# Patient Record
Sex: FEMALE | Race: WHITE | NOT HISPANIC OR LATINO | Employment: OTHER | URBAN - METROPOLITAN AREA
[De-identification: names, ages, dates, MRNs, and addresses within clinical notes are randomized per-mention and may not be internally consistent; named-entity substitution may affect disease eponyms.]

---

## 2021-09-13 ENCOUNTER — HOSPITAL ENCOUNTER (EMERGENCY)
Facility: HOSPITAL | Age: 61
Discharge: HOME | End: 2021-09-13
Attending: EMERGENCY MEDICINE | Admitting: EMERGENCY MEDICINE

## 2021-09-13 ENCOUNTER — APPOINTMENT (OUTPATIENT)
Dept: GENERAL RADIOLOGY | Facility: HOSPITAL | Age: 61
End: 2021-09-13

## 2021-09-13 ENCOUNTER — APPOINTMENT (OUTPATIENT)
Dept: CT IMAGING | Facility: HOSPITAL | Age: 61
End: 2021-09-13

## 2021-09-13 VITALS
SYSTOLIC BLOOD PRESSURE: 146 MMHG | TEMPERATURE: 99.2 F | WEIGHT: 130 LBS | HEART RATE: 66 BPM | HEIGHT: 60 IN | RESPIRATION RATE: 16 BRPM | DIASTOLIC BLOOD PRESSURE: 85 MMHG | OXYGEN SATURATION: 96 % | BODY MASS INDEX: 25.52 KG/M2

## 2021-09-13 DIAGNOSIS — N39.0 ACUTE URINARY TRACT INFECTION: Primary | ICD-10-CM

## 2021-09-13 DIAGNOSIS — R41.0 TRANSIENT CONFUSION: ICD-10-CM

## 2021-09-13 LAB
ALBUMIN SERPL-MCNC: 4.7 G/DL (ref 3.5–5.2)
ALBUMIN/GLOB SERPL: 1.9 G/DL
ALP SERPL-CCNC: 89 U/L (ref 39–117)
ALT SERPL W P-5'-P-CCNC: 11 U/L (ref 1–33)
ANION GAP SERPL CALCULATED.3IONS-SCNC: 13 MMOL/L (ref 5–15)
AST SERPL-CCNC: 15 U/L (ref 1–32)
BACTERIA UR QL AUTO: ABNORMAL /HPF
BASOPHILS # BLD AUTO: 0.13 10*3/MM3 (ref 0–0.2)
BASOPHILS NFR BLD AUTO: 1.2 % (ref 0–1.5)
BILIRUB SERPL-MCNC: 0.2 MG/DL (ref 0–1.2)
BILIRUB UR QL STRIP: NEGATIVE
BUN SERPL-MCNC: 15 MG/DL (ref 8–23)
BUN/CREAT SERPL: 15.6 (ref 7–25)
CALCIUM SPEC-SCNC: 9.5 MG/DL (ref 8.6–10.5)
CHLORIDE SERPL-SCNC: 99 MMOL/L (ref 98–107)
CLARITY UR: ABNORMAL
CO2 SERPL-SCNC: 25 MMOL/L (ref 22–29)
COLOR UR: YELLOW
CREAT SERPL-MCNC: 0.96 MG/DL (ref 0.57–1)
DEPRECATED RDW RBC AUTO: 44.8 FL (ref 37–54)
EOSINOPHIL # BLD AUTO: 0.03 10*3/MM3 (ref 0–0.4)
EOSINOPHIL NFR BLD AUTO: 0.3 % (ref 0.3–6.2)
ERYTHROCYTE [DISTWIDTH] IN BLOOD BY AUTOMATED COUNT: 12.6 % (ref 12.3–15.4)
FLUAV SUBTYP SPEC NAA+PROBE: NOT DETECTED
FLUBV RNA ISLT QL NAA+PROBE: NOT DETECTED
GFR SERPL CREATININE-BSD FRML MDRD: 59 ML/MIN/1.73
GLOBULIN UR ELPH-MCNC: 2.5 GM/DL
GLUCOSE SERPL-MCNC: 139 MG/DL (ref 65–99)
GLUCOSE UR STRIP-MCNC: NEGATIVE MG/DL
HCT VFR BLD AUTO: 44.5 % (ref 34–46.6)
HGB BLD-MCNC: 14.6 G/DL (ref 12–15.9)
HGB UR QL STRIP.AUTO: ABNORMAL
HOLD SPECIMEN: NORMAL
HYALINE CASTS UR QL AUTO: ABNORMAL /LPF
IMM GRANULOCYTES # BLD AUTO: 0.05 10*3/MM3 (ref 0–0.05)
IMM GRANULOCYTES NFR BLD AUTO: 0.4 % (ref 0–0.5)
KETONES UR QL STRIP: ABNORMAL
LEUKOCYTE ESTERASE UR QL STRIP.AUTO: NEGATIVE
LYMPHOCYTES # BLD AUTO: 1.44 10*3/MM3 (ref 0.7–3.1)
LYMPHOCYTES NFR BLD AUTO: 12.9 % (ref 19.6–45.3)
MAGNESIUM SERPL-MCNC: 1.7 MG/DL (ref 1.6–2.4)
MCH RBC QN AUTO: 31.3 PG (ref 26.6–33)
MCHC RBC AUTO-ENTMCNC: 32.8 G/DL (ref 31.5–35.7)
MCV RBC AUTO: 95.5 FL (ref 79–97)
MONOCYTES # BLD AUTO: 0.61 10*3/MM3 (ref 0.1–0.9)
MONOCYTES NFR BLD AUTO: 5.5 % (ref 5–12)
NEUTROPHILS NFR BLD AUTO: 79.7 % (ref 42.7–76)
NEUTROPHILS NFR BLD AUTO: 8.9 10*3/MM3 (ref 1.7–7)
NITRITE UR QL STRIP: NEGATIVE
NRBC BLD AUTO-RTO: 0 /100 WBC (ref 0–0.2)
PH UR STRIP.AUTO: 6 [PH] (ref 5–8)
PLATELET # BLD AUTO: 399 10*3/MM3 (ref 140–450)
PMV BLD AUTO: 9.3 FL (ref 6–12)
POTASSIUM SERPL-SCNC: 4.5 MMOL/L (ref 3.5–5.2)
PROT SERPL-MCNC: 7.2 G/DL (ref 6–8.5)
PROT UR QL STRIP: ABNORMAL
RBC # BLD AUTO: 4.66 10*6/MM3 (ref 3.77–5.28)
RBC # UR: ABNORMAL /HPF
REF LAB TEST METHOD: ABNORMAL
SARS-COV-2 RNA PNL SPEC NAA+PROBE: NOT DETECTED
SODIUM SERPL-SCNC: 137 MMOL/L (ref 136–145)
SP GR UR STRIP: 1.02 (ref 1–1.03)
SQUAMOUS #/AREA URNS HPF: ABNORMAL /HPF
TROPONIN T SERPL-MCNC: <0.01 NG/ML (ref 0–0.03)
UROBILINOGEN UR QL STRIP: ABNORMAL
WBC # BLD AUTO: 11.16 10*3/MM3 (ref 3.4–10.8)
WBC UR QL AUTO: ABNORMAL /HPF
WHOLE BLOOD HOLD SPECIMEN: NORMAL
WHOLE BLOOD HOLD SPECIMEN: NORMAL

## 2021-09-13 PROCEDURE — 25010000002 CEFTRIAXONE PER 250 MG: Performed by: EMERGENCY MEDICINE

## 2021-09-13 PROCEDURE — 94640 AIRWAY INHALATION TREATMENT: CPT

## 2021-09-13 PROCEDURE — 70450 CT HEAD/BRAIN W/O DYE: CPT

## 2021-09-13 PROCEDURE — 87636 SARSCOV2 & INF A&B AMP PRB: CPT | Performed by: EMERGENCY MEDICINE

## 2021-09-13 PROCEDURE — 84484 ASSAY OF TROPONIN QUANT: CPT | Performed by: EMERGENCY MEDICINE

## 2021-09-13 PROCEDURE — 85025 COMPLETE CBC W/AUTO DIFF WBC: CPT | Performed by: EMERGENCY MEDICINE

## 2021-09-13 PROCEDURE — 83735 ASSAY OF MAGNESIUM: CPT | Performed by: EMERGENCY MEDICINE

## 2021-09-13 PROCEDURE — 96365 THER/PROPH/DIAG IV INF INIT: CPT

## 2021-09-13 PROCEDURE — 0042T HC CT CEREBRAL PERFUSION W/WO CONTRAST: CPT

## 2021-09-13 PROCEDURE — 70498 CT ANGIOGRAPHY NECK: CPT

## 2021-09-13 PROCEDURE — 71045 X-RAY EXAM CHEST 1 VIEW: CPT

## 2021-09-13 PROCEDURE — 81001 URINALYSIS AUTO W/SCOPE: CPT | Performed by: EMERGENCY MEDICINE

## 2021-09-13 PROCEDURE — 80053 COMPREHEN METABOLIC PANEL: CPT | Performed by: EMERGENCY MEDICINE

## 2021-09-13 PROCEDURE — 70496 CT ANGIOGRAPHY HEAD: CPT

## 2021-09-13 PROCEDURE — 0 IOPAMIDOL PER 1 ML: Performed by: EMERGENCY MEDICINE

## 2021-09-13 PROCEDURE — 93005 ELECTROCARDIOGRAM TRACING: CPT | Performed by: EMERGENCY MEDICINE

## 2021-09-13 PROCEDURE — 99284 EMERGENCY DEPT VISIT MOD MDM: CPT

## 2021-09-13 RX ORDER — CEFDINIR 300 MG/1
300 CAPSULE ORAL 2 TIMES DAILY
Qty: 14 CAPSULE | Refills: 0 | Status: SHIPPED | OUTPATIENT
Start: 2021-09-13 | End: 2021-09-20

## 2021-09-13 RX ORDER — SODIUM CHLORIDE 0.9 % (FLUSH) 0.9 %
10 SYRINGE (ML) INJECTION AS NEEDED
Status: DISCONTINUED | OUTPATIENT
Start: 2021-09-13 | End: 2021-09-13 | Stop reason: HOSPADM

## 2021-09-13 RX ORDER — ALBUTEROL SULFATE 90 UG/1
6 AEROSOL, METERED RESPIRATORY (INHALATION) ONCE
Status: COMPLETED | OUTPATIENT
Start: 2021-09-13 | End: 2021-09-13

## 2021-09-13 RX ADMIN — SODIUM CHLORIDE 2 G: 900 INJECTION INTRAVENOUS at 20:04

## 2021-09-13 RX ADMIN — ALBUTEROL SULFATE 6 PUFF: 90 AEROSOL, METERED RESPIRATORY (INHALATION) at 17:56

## 2021-09-13 RX ADMIN — IOPAMIDOL 110 ML: 755 INJECTION, SOLUTION INTRAVENOUS at 18:31

## 2021-09-13 NOTE — DISCHARGE INSTRUCTIONS
Take antibiotics as prescribed.    Rest and drink plenty of fluids.    Follow-up with primary care physician in 2 days.    Return to the ER with any further concern or worsening symptoms.

## 2021-09-14 NOTE — ED PROVIDER NOTES
Subjective   60-year-old female who presents for evaluation of altered mental status.  Per the daughter's report the patient had a couple episodes of a dazed appearance.  The daughter states that at 3 PM the patient appeared dazed, and had difficulty getting her words out.  This resolved within just a few minutes.  She had another episode at approxi-4:30 PM that was similar nature.  She now has presented for further evaluation.  She talks and answers all questions appropriately currently.  Per the daughter's report the patient has a previous history of TIAs.  The patient was not observed to have focal weakness to the face, arms, or legs during these episodes.  No fever has been observed at home. The patient has underlying history of COPD but denies any acute shortness of breath or increased frequency of cough.  The patient does report some mild urinary frequency, but denies any dysuria.  No abdominal pain.  No reported change in bowel function.  No other acute complaints.          Review of Systems   Constitutional: Negative for chills, fatigue and fever.   HENT: Negative for congestion, ear pain, postnasal drip, sinus pressure and sore throat.    Eyes: Negative for pain, redness and visual disturbance.   Respiratory: Negative for cough, chest tightness and shortness of breath.    Cardiovascular: Negative for chest pain, palpitations and leg swelling.   Gastrointestinal: Negative for abdominal pain, anal bleeding, blood in stool, diarrhea, nausea and vomiting.   Endocrine: Negative for polydipsia and polyuria.   Genitourinary: Positive for frequency. Negative for difficulty urinating, dysuria and urgency.   Musculoskeletal: Negative for arthralgias, back pain and neck pain.   Skin: Negative for pallor and rash.   Allergic/Immunologic: Negative for environmental allergies and immunocompromised state.   Neurological: Positive for speech difficulty. Negative for dizziness, weakness and headaches.   Hematological:  Negative for adenopathy.   Psychiatric/Behavioral: Positive for decreased concentration. Negative for confusion, self-injury and suicidal ideas. The patient is not nervous/anxious.    All other systems reviewed and are negative.      No past medical history on file.    No Known Allergies    No past surgical history on file.    No family history on file.    Social History     Socioeconomic History   • Marital status: Legally      Spouse name: Not on file   • Number of children: Not on file   • Years of education: Not on file   • Highest education level: Not on file           Objective   Physical Exam  Vitals and nursing note reviewed.   Constitutional:       General: She is not in acute distress.     Appearance: Normal appearance. She is well-developed. She is not toxic-appearing or diaphoretic.   HENT:      Head: Normocephalic and atraumatic.      Right Ear: External ear normal.      Left Ear: External ear normal.      Nose: Nose normal.   Eyes:      General: Lids are normal.      Pupils: Pupils are equal, round, and reactive to light.   Neck:      Trachea: No tracheal deviation.   Cardiovascular:      Rate and Rhythm: Normal rate and regular rhythm.      Pulses: No decreased pulses.      Heart sounds: Normal heart sounds. No murmur heard.   No friction rub. No gallop.    Pulmonary:      Effort: Pulmonary effort is normal. No respiratory distress.      Breath sounds: Examination of the right-middle field reveals wheezing. Examination of the left-middle field reveals wheezing. Examination of the right-lower field reveals wheezing. Examination of the left-lower field reveals wheezing. Wheezing present. No decreased breath sounds, rhonchi or rales.       Abdominal:      General: Bowel sounds are normal.      Palpations: Abdomen is soft.      Tenderness: There is no abdominal tenderness. There is no guarding or rebound.   Musculoskeletal:         General: No deformity. Normal range of motion.      Cervical  back: Normal range of motion and neck supple.   Lymphadenopathy:      Cervical: No cervical adenopathy.   Skin:     General: Skin is warm and dry.      Findings: No rash.   Neurological:      Mental Status: She is alert and oriented to person, place, and time.      GCS: GCS eye subscore is 4. GCS verbal subscore is 5. GCS motor subscore is 6.      Cranial Nerves: No cranial nerve deficit.      Sensory: No sensory deficit.      Comments: No neurological deficits on exam.  GCS 15.   Psychiatric:         Speech: Speech normal.         Behavior: Behavior normal.         Thought Content: Thought content normal.         Judgment: Judgment normal.         Procedures           ED Course                                           MDM  Number of Diagnoses or Management Options  Acute urinary tract infection: new and requires workup  Transient confusion: new and requires workup  Diagnosis management comments: Urine shows 1+ bacteria concerning for acute urinary tract infection.    The patient's overall presentation is potentially concerning for TIA.  I discussed admission to the hospital with the patient, and with the daughter.  The patient strongly desires to go home, and the daughter is agreeable with this plan at this given time.  The patient has no focal neurological deficits at this given time and CT imaging of the head shows no acute abnormalities.    An initial dose of IV antibiotics was administered here in the ER and the patient will be discharged with a course of Omnicef.  The patient will be advised to rest, drink plenty of fluids, observe for any further symptoms.  Advised to return immediately to the ER with any further concern.    The patient does have a follow-up with primary care physician in 2 days.       Amount and/or Complexity of Data Reviewed  Clinical lab tests: ordered and reviewed  Tests in the radiology section of CPT®: reviewed and ordered  Obtain history from someone other than the patient:  yes  Independent visualization of images, tracings, or specimens: yes        Final diagnoses:   Acute urinary tract infection   Transient confusion       ED Disposition  ED Disposition     ED Disposition Condition Comment    Discharge Stable           Maragrita Lynch MD  [address] [place] [address]   [place] [id number] [phone]    In 2 days           Medication List      New Prescriptions    cefdinir 300 MG capsule  Commonly known as: OMNICEF  Take 1 capsule by mouth 2 (Two) Times a Day for 7 days.           Where to Get Your Medications      These medications were sent to [facility] [address] [place], [place] - [address] [place] [address] - [phone]  - [phone] [address] [address]    Phone: [phone]   · cefdinir 300 MG capsule          Matthew Cleaning MD  09/13/21 2013

## 2021-09-15 ENCOUNTER — OFFICE VISIT (OUTPATIENT)
Dept: NEUROSURGERY | Facility: CLINIC | Age: 61
End: 2021-09-15

## 2021-09-15 VITALS — WEIGHT: 141 LBS | TEMPERATURE: 97.3 F | HEIGHT: 60 IN | BODY MASS INDEX: 27.68 KG/M2

## 2021-09-15 DIAGNOSIS — M47.812 CERVICAL SPONDYLOSIS WITHOUT MYELOPATHY: ICD-10-CM

## 2021-09-15 DIAGNOSIS — Z00.6 EXAM FOR CLINICAL RESEARCH: Primary | ICD-10-CM

## 2021-09-15 DIAGNOSIS — M51.36 DEGENERATIVE DISC DISEASE, LUMBAR: ICD-10-CM

## 2021-09-15 DIAGNOSIS — M54.12 CERVICAL RADICULOPATHY: ICD-10-CM

## 2021-09-15 DIAGNOSIS — M50.30 DDD (DEGENERATIVE DISC DISEASE), CERVICAL: ICD-10-CM

## 2021-09-15 DIAGNOSIS — M47.812 CERVICAL SPONDYLOSIS WITHOUT MYELOPATHY: Primary | ICD-10-CM

## 2021-09-15 DIAGNOSIS — Z71.6 ENCOUNTER FOR SMOKING CESSATION COUNSELING: ICD-10-CM

## 2021-09-15 DIAGNOSIS — M54.59 MECHANICAL LOW BACK PAIN: ICD-10-CM

## 2021-09-15 PROCEDURE — 99204 OFFICE O/P NEW MOD 45 MIN: CPT | Performed by: PHYSICIAN ASSISTANT

## 2021-09-15 RX ORDER — BUSPIRONE HYDROCHLORIDE 5 MG/1
5 TABLET ORAL 2 TIMES DAILY
COMMUNITY
Start: 2021-08-20

## 2021-09-15 RX ORDER — OMEPRAZOLE 20 MG/1
20 CAPSULE, DELAYED RELEASE ORAL 2 TIMES DAILY
COMMUNITY
Start: 2021-08-20

## 2021-09-15 RX ORDER — MELOXICAM 15 MG/1
15 TABLET ORAL DAILY
Status: ON HOLD | COMMUNITY
End: 2023-03-17

## 2021-09-15 RX ORDER — ASPIRIN 325 MG
1 TABLET ORAL
Status: ON HOLD | COMMUNITY
End: 2022-10-16

## 2021-09-15 RX ORDER — TRAZODONE HYDROCHLORIDE 50 MG/1
50 TABLET ORAL NIGHTLY
COMMUNITY

## 2021-09-15 RX ORDER — CLOPIDOGREL BISULFATE 75 MG/1
75 TABLET ORAL DAILY
COMMUNITY
Start: 2021-08-20

## 2021-09-15 RX ORDER — POTASSIUM CHLORIDE 750 MG/1
10 TABLET, FILM COATED, EXTENDED RELEASE ORAL DAILY
COMMUNITY
Start: 2021-08-20 | End: 2023-03-20 | Stop reason: HOSPADM

## 2021-09-15 RX ORDER — TIZANIDINE 4 MG/1
4 TABLET ORAL DAILY
COMMUNITY
Start: 2021-08-20 | End: 2023-03-20 | Stop reason: HOSPADM

## 2021-09-15 RX ORDER — SPIRONOLACTONE 25 MG/1
25 TABLET ORAL DAILY
COMMUNITY
Start: 2021-08-20 | End: 2023-03-20 | Stop reason: HOSPADM

## 2021-09-15 RX ORDER — HYDRALAZINE HYDROCHLORIDE 25 MG/1
25 TABLET, FILM COATED ORAL 3 TIMES DAILY
COMMUNITY
Start: 2021-08-20 | End: 2023-03-20 | Stop reason: HOSPADM

## 2021-09-15 RX ORDER — ESCITALOPRAM OXALATE 20 MG/1
20 TABLET ORAL DAILY
COMMUNITY
Start: 2021-08-20

## 2021-09-15 RX ORDER — AMLODIPINE BESYLATE 10 MG/1
10 TABLET ORAL DAILY
COMMUNITY
Start: 2021-08-20

## 2021-09-15 RX ORDER — MECLIZINE HYDROCHLORIDE 25 MG/1
TABLET ORAL SEE ADMIN INSTRUCTIONS
COMMUNITY
Start: 2021-08-20 | End: 2023-03-20 | Stop reason: HOSPADM

## 2021-09-15 RX ORDER — LOSARTAN POTASSIUM 100 MG/1
100 TABLET ORAL DAILY
COMMUNITY
Start: 2021-08-20 | End: 2023-03-20 | Stop reason: HOSPADM

## 2021-09-15 RX ORDER — METOPROLOL SUCCINATE 100 MG/1
50 TABLET, EXTENDED RELEASE ORAL DAILY
COMMUNITY
End: 2023-03-20 | Stop reason: HOSPADM

## 2021-09-15 RX ORDER — LEVETIRACETAM 500 MG/1
500 TABLET ORAL 2 TIMES DAILY
COMMUNITY
Start: 2021-08-20 | End: 2022-10-17 | Stop reason: HOSPADM

## 2021-09-15 RX ORDER — GABAPENTIN 400 MG/1
400 CAPSULE ORAL 3 TIMES DAILY
COMMUNITY
Start: 2021-08-20

## 2021-09-15 RX ORDER — PROMETHAZINE HYDROCHLORIDE 12.5 MG/1
TABLET ORAL
COMMUNITY
Start: 2021-08-20 | End: 2023-03-20 | Stop reason: HOSPADM

## 2021-09-15 RX ORDER — HYDROCODONE BITARTRATE AND ACETAMINOPHEN 7.5; 325 MG/1; MG/1
TABLET ORAL
COMMUNITY
Start: 2021-08-27

## 2021-09-15 NOTE — PROGRESS NOTES
Patient: Gabriela Waller  : 1960  GENDER: female    Primary Care Provider: Margarita Lynch MD    Requesting Provider: As above      History    Chief Complaint:   1.  Neck and left arm pain with sensory alteration  2.  Low back and left leg pain with sensory alteration    History of Present Illness: Ms. 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  She presents to our service with neck and bilateral upper extremity complaints, as well as low back and left leg pain for the past 10-15 years.  Symptoms in her neck are constant and increase in severity with activity at shoulder height or above.  She additionally endorses sensory alteration extending into her left shoulder, continuing down her upper arm-terminating around the elbow.  She additionally endorses sensory alteration to all fingers in bilateral hands.  Sensory alteration has been present for approximately 1 year without inciting trauma.  Symptoms in her low back have progressed over the last 1-2 years to include left leg radicular complaints extending laterally down her leg-terminating at her ankle.  Symptoms in her low back and left leg are constant and increase in severity with prolonged sitting >10-15 minutes, and improved with laying flat.  She does endorse some right hip discomfort particularly in the mornings.  Historically she has undergone several rounds of physical therapy over the years without lasting benefit.  She denies bowel bladder dysfunction, true neurogenic claudication symptoms, or overt weakness.  She has no other complaints at this time.    Review of Systems   Constitutional: Positive for activity change, chills, fatigue and unexpected weight change. Negative for appetite change, diaphoresis and fever.   HENT: Positive for dental problem and sinus pressure.  Negative for congestion, drooling, ear discharge, ear pain, facial swelling, hearing loss, mouth sores, nosebleeds, postnasal drip, rhinorrhea, sneezing, sore throat, tinnitus, trouble swallowing and voice change.    Eyes: Positive for visual disturbance. Negative for photophobia, pain, discharge, redness and itching.   Respiratory: Positive for shortness of breath. Negative for apnea, cough, choking, chest tightness, wheezing and stridor.    Cardiovascular: Negative for chest pain, palpitations and leg swelling.   Gastrointestinal: Positive for abdominal pain. Negative for abdominal distention, anal bleeding, blood in stool, constipation, diarrhea, nausea, rectal pain and vomiting.   Endocrine: Negative for cold intolerance, heat intolerance, polydipsia, polyphagia and polyuria.   Genitourinary: Negative for decreased urine volume, difficulty urinating, dysuria, enuresis, flank pain, frequency, genital sores, hematuria and urgency.   Musculoskeletal: Positive for back pain, neck pain and neck stiffness. Negative for arthralgias, gait problem, joint swelling and myalgias.   Skin: Negative for color change, pallor, rash and wound.   Allergic/Immunologic: Positive for food allergies. Negative for environmental allergies and immunocompromised state.   Neurological: Positive for dizziness, seizures and light-headedness. Negative for tremors, syncope, facial asymmetry, speech difficulty, weakness, numbness and headaches.   Hematological: Negative for adenopathy. Does not bruise/bleed easily.   Psychiatric/Behavioral: Negative for agitation, behavioral problems, confusion, decreased concentration, dysphoric mood, hallucinations, self-injury, sleep disturbance and suicidal ideas. The patient is not nervous/anxious and is not hyperactive.    All other systems reviewed and are negative.      Past Medical History:   Diagnosis Date   • Arthritis    • Chronic kidney disease    • Headache    • Heart disease    • Heart failure  (CMS/HCC)    • Hypertension    • Low back pain    • Osteoporosis    • Recent urinary tract infection    • Seizures (CMS/HCC)      Past Surgical History:   Procedure Laterality Date   •  SECTION         Current Outpatient Medications:   •  amLODIPine (NORVASC) 10 MG tablet, Take 10 mg by mouth Daily., Disp: , Rfl:   •  aspirin 325 MG tablet, Take 1 tablet by mouth., Disp: , Rfl:   •  busPIRone (BUSPAR) 5 MG tablet, Take 5 mg by mouth 2 (Two) Times a Day., Disp: , Rfl:   •  cefdinir (OMNICEF) 300 MG capsule, Take 1 capsule by mouth 2 (Two) Times a Day for 7 days., Disp: 14 capsule, Rfl: 0  •  clopidogrel (PLAVIX) 75 MG tablet, Take 75 mg by mouth Daily., Disp: , Rfl:   •  escitalopram (LEXAPRO) 20 MG tablet, Take 20 mg by mouth Daily., Disp: , Rfl:   •  gabapentin (NEURONTIN) 400 MG capsule, , Disp: , Rfl:   •  hydrALAZINE (APRESOLINE) 25 MG tablet, Take 25 mg by mouth Daily As Needed., Disp: , Rfl:   •  HYDROcodone-acetaminophen (NORCO) 7.5-325 MG per tablet, , Disp: , Rfl:   •  levETIRAcetam (KEPPRA) 500 MG tablet, Take 500 mg by mouth 2 (Two) Times a Day., Disp: , Rfl:   •  losartan (COZAAR) 100 MG tablet, Take 100 mg by mouth Daily., Disp: , Rfl:   •  meclizine (ANTIVERT) 25 MG tablet, Take  by mouth See Admin Instructions. Take 1 tablet by mouth every 6-8 hours as needed, Disp: , Rfl:   •  meloxicam (MOBIC) 15 MG tablet, Take 15 mg by mouth Daily., Disp: , Rfl:   •  metoprolol succinate XL (TOPROL-XL) 100 MG 24 hr tablet, Take 1.5 tablets by mouth., Disp: , Rfl:   •  omeprazole (priLOSEC) 20 MG capsule, Take 20 mg by mouth Daily., Disp: , Rfl:   •  potassium chloride 10 MEQ CR tablet, Take 10 mEq by mouth Daily., Disp: , Rfl:   •  promethazine (PHENERGAN) 12.5 MG tablet, TAKE 1 TABLET BY MOUTH EVERY DAY AS NEEDED FORNAUSEA AND VOMITING, Disp: , Rfl:   •  spironolactone (ALDACTONE) 25 MG tablet, Take 25 mg by mouth Daily., Disp: , Rfl:   •  tiZANidine (ZANAFLEX) 4 MG tablet, , Disp: , Rfl:   •  traZODone  "(DESYREL) 50 MG tablet, Take 50 mg by mouth Every Night., Disp: , Rfl:     No Known Allergies    The patient's review of systems, past medical history, past surgical history, family history, and social history have been reviewed at length in the electronic medical record.    Physical Exam:   Temp 97.3 °F (36.3 °C) (Infrared)   Ht 152.4 cm (60\")   Wt 64 kg (141 lb)   BMI 27.54 kg/m²   CONSTITUTIONAL:   - Patient is well-nourished  - Pleasant and appears stated age.  PSYCHIATRIC:  - Normal mood and affect  - Behavior is normal.  - Thought content is normal  HENT:   Head: Normocephalic and Atraumatic.   Eyes:     - Pupils are equal, round, and reactive to light.     - EOM are normal.   CV:   - Regular rate and rhythm on palpable radial pulse   - No murmur appreciated   PULMONARY:   - Speaking in full sentences  - No use of accessory muscles   - Breathing is non-labored   - No wheezing   SKIN:  - Clean, dry and intact   MUSCULOSKELETAL:  - Neck/Back tenderness to palpation is not observed.   - ROM in neck/back is normal.  - Straight leg raise is negative   - Mateus's sign is negative   NEUROLOGICAL:  - A&Ox3  - Attention span, language function, and cognition are intact.  - Strength is intact in the upper and lower extremities to direct testing.  - Muscle tone is normal throughout.  - Station and gait are normal.  - Sensation is intact to light touch testing throughout.  - Deep tendon reflexes are 2+ and symmetrical.    - Hernandez's Sign is negative bilaterally.  - No clonus is elicited.  - Coordination is intact.    B/L UE CTS/CuTS Assessment:  - (+) Tinels: bilateral carpal and ulnar   - (+) Phalen's Sign   - (+) slight APB atrophy appreciated (R>L)  - Good strength with resisted finger abduction   - (-) ulnar clawing   - (-) Froment's Sign   - Sensation intact to light touch  - Vascular intact     Patient's Body mass index is 27.54 kg/m². indicating that she is overweight (BMI 25-29.9). Obesity-related health " conditions include the following: hypertension, coronary heart disease and osteoarthritis. Obesity is unchanged. BMI is is above average; BMI management plan is completed. We discussed portion control and increasing exercise..         Medical Decision Making    Data Review:   1. CT Cervical Spine (3/04/2021):  Independent review of radiographic imaging demonstrates degenerative changes greatest at C5-6 and C6-7 where there is marked disc space narrowing and adjacent spondylosis with foraminal narrowing bilaterally.  There is also evidence of central stenosis at the noted levels above.    Diagnosis/Treatment Options:  1. Exam for clinical research  2. Cervical spondylosis without myelopathy  3. Cervical radiculopathy  4. DDD (degenerative disc disease), cervical  5. Degenerative disc disease, lumbar  6. Mechanical low back pain  7. BMI 27.0-27.9,adult  8. Encounter for smoking cessation counseling    - MRI Lumbar Spine Without Contrast  - MRI Cervical Spine Without Contrast  - EMG & Nerve Conduction Test    Follow up:  Ms. Waller is seen today with cervical and lumbar complaints.  I referred her for an MRI of the cervical spine without gadolinium as well as electrodiagnostic studies to confirm/rule out cervical versus peripheral nerve involvement.  I have also sent her for an MRI of the lumbar spine without gadolinium for review.  Patient will follow-up in the office thereafter for reassessment.  Treatment recommendations to follow pending findings.    Rosalie Alejo PA-C   9/15/2021   12:25 EDT

## 2021-09-15 NOTE — PATIENT INSTRUCTIONS

## 2021-09-16 ENCOUNTER — PATIENT ROUNDING (BHMG ONLY) (OUTPATIENT)
Dept: NEUROSURGERY | Facility: CLINIC | Age: 61
End: 2021-09-16

## 2021-09-16 NOTE — PROGRESS NOTES
September 16, 2021    Hello, may I speak with Gabriela Waller?    My name is Kacie Riley    [facility] am  with [facility]  [facility]  [address]  [place] [address] [phone].    Before we get started may I verify your date of birth? 1960    I am calling to officially welcome you to our practice and ask about your recent visit. Is this a good time to talk? Yes    Tell me about your visit with us. What things went well?  It was a good visit and she appreciated all we did.       We're always looking for ways to make our patients' experiences even better. Do you have recommendations on ways we may improve?  No    Overall were you satisfied with your first visit to our practice? Yes - we reviewed her upcoming tests and appointments and told her to call with questions or concerns prior to her 10-27-21 follow-up with Ms. Alejo.       I appreciate you taking the time to speak with me today. Is there anything else I can do for you?no      Thank you, and have a great day.

## 2021-09-18 LAB
QT INTERVAL: 414 MS
QTC INTERVAL: 440 MS

## 2021-09-30 ENCOUNTER — TELEPHONE (OUTPATIENT)
Dept: NEUROSURGERY | Facility: CLINIC | Age: 61
End: 2021-09-30

## 2021-10-05 ENCOUNTER — TELEPHONE (OUTPATIENT)
Dept: NEUROSURGERY | Facility: CLINIC | Age: 61
End: 2021-10-05

## 2021-10-05 NOTE — TELEPHONE ENCOUNTER
Caller: CLARA    Relationship: [facility]    Best call back number:[phone]    What is the best time to reach you:NA    Who are you requesting to speak with (clinical staff, provider,  specific staff member): PROVIDER    Do you know the name of the person who called:NA    What was the call regarding:CLARA FROM [facility] CALLED AND STATES THAT SHE IS CALLING TO GIVE THE NUMBER FOR A PEER TO PEER REGARDING THE  PTS MRI-CLARA STATES THAT NUMBER TO CALL IS [phone] OPTION 3 AND THE TRACKING NUMBER IS [phone]  WAS GOING TO WARM TRANSFER TO THE OFFICE AND SHE STATES THAT SHE JUST NEEDED TO LEAVE THE TRACKING NUMBER AND NUMBER FOR THE PEER TO PEER    Do you require a callback: YES

## 2021-10-09 ENCOUNTER — APPOINTMENT (OUTPATIENT)
Dept: MRI IMAGING | Facility: HOSPITAL | Age: 61
End: 2021-10-09

## 2021-10-09 ENCOUNTER — HOSPITAL ENCOUNTER (OUTPATIENT)
Dept: MRI IMAGING | Facility: HOSPITAL | Age: 61
Discharge: HOME | End: 2021-10-09
Admitting: PHYSICIAN ASSISTANT

## 2021-10-09 PROCEDURE — 72141 MRI NECK SPINE W/O DYE: CPT

## 2021-10-27 ENCOUNTER — APPOINTMENT (OUTPATIENT)
Dept: NEUROLOGY | Facility: HOSPITAL | Age: 61
End: 2021-10-27

## 2021-12-08 ENCOUNTER — HOSPITAL ENCOUNTER (OUTPATIENT)
Dept: NEUROLOGY | Facility: HOSPITAL | Age: 61
Discharge: HOME | End: 2021-12-08
Admitting: PHYSICIAN ASSISTANT

## 2021-12-08 PROCEDURE — 95886 MUSC TEST DONE W/N TEST COMP: CPT | Performed by: PSYCHIATRY & NEUROLOGY

## 2021-12-08 PROCEDURE — 95911 NRV CNDJ TEST 9-10 STUDIES: CPT | Performed by: PSYCHIATRY & NEUROLOGY

## 2021-12-08 PROCEDURE — 95911 NRV CNDJ TEST 9-10 STUDIES: CPT

## 2021-12-08 PROCEDURE — 95886 MUSC TEST DONE W/N TEST COMP: CPT

## 2021-12-20 ENCOUNTER — TELEPHONE (OUTPATIENT)
Dept: NEUROSURGERY | Facility: CLINIC | Age: 61
End: 2021-12-20

## 2022-02-09 ENCOUNTER — OFFICE VISIT (OUTPATIENT)
Dept: NEUROSURGERY | Facility: CLINIC | Age: 62
End: 2022-02-09

## 2022-02-09 ENCOUNTER — HOSPITAL ENCOUNTER (OUTPATIENT)
Dept: GENERAL RADIOLOGY | Facility: HOSPITAL | Age: 62
Discharge: HOME | End: 2022-02-09
Admitting: PHYSICIAN ASSISTANT

## 2022-02-09 VITALS
TEMPERATURE: 97.7 F | HEART RATE: 65 BPM | DIASTOLIC BLOOD PRESSURE: 70 MMHG | BODY MASS INDEX: 28.66 KG/M2 | SYSTOLIC BLOOD PRESSURE: 125 MMHG | OXYGEN SATURATION: 99 % | HEIGHT: 60 IN | WEIGHT: 146 LBS

## 2022-02-09 DIAGNOSIS — M54.42 CHRONIC MIDLINE LOW BACK PAIN WITH LEFT-SIDED SCIATICA: Primary | ICD-10-CM

## 2022-02-09 DIAGNOSIS — G89.29 CHRONIC MIDLINE LOW BACK PAIN WITH LEFT-SIDED SCIATICA: ICD-10-CM

## 2022-02-09 DIAGNOSIS — M51.36 DEGENERATIVE DISC DISEASE, LUMBAR: ICD-10-CM

## 2022-02-09 DIAGNOSIS — M50.30 DEGENERATIVE DISC DISEASE, CERVICAL: ICD-10-CM

## 2022-02-09 DIAGNOSIS — M54.42 CHRONIC MIDLINE LOW BACK PAIN WITH LEFT-SIDED SCIATICA: ICD-10-CM

## 2022-02-09 DIAGNOSIS — G89.29 CHRONIC MIDLINE LOW BACK PAIN WITH LEFT-SIDED SCIATICA: Primary | ICD-10-CM

## 2022-02-09 DIAGNOSIS — M50.20 CERVICAL DISC HERNIATION: ICD-10-CM

## 2022-02-09 PROBLEM — M51.369 DEGENERATIVE DISC DISEASE, LUMBAR: Status: ACTIVE | Noted: 2022-02-09

## 2022-02-09 PROCEDURE — 72120 X-RAY BEND ONLY L-S SPINE: CPT

## 2022-02-09 PROCEDURE — 99214 OFFICE O/P EST MOD 30 MIN: CPT | Performed by: PHYSICIAN ASSISTANT

## 2022-02-09 RX ORDER — ICOSAPENT ETHYL 1000 MG/1
2 CAPSULE ORAL 2 TIMES DAILY
Status: ON HOLD | COMMUNITY
Start: 2021-12-12 | End: 2023-03-17

## 2022-02-09 RX ORDER — ATORVASTATIN CALCIUM 40 MG/1
40 TABLET, FILM COATED ORAL NIGHTLY
COMMUNITY
Start: 2022-01-10 | End: 2022-10-17 | Stop reason: HOSPADM

## 2022-02-09 RX ORDER — EZETIMIBE 10 MG/1
1 TABLET ORAL DAILY
Status: ON HOLD | COMMUNITY
End: 2023-03-17

## 2022-02-09 RX ORDER — LANOLIN ALCOHOL/MO/W.PET/CERES
1000 CREAM (GRAM) TOPICAL DAILY
COMMUNITY
Start: 2022-01-10

## 2022-02-09 NOTE — PROGRESS NOTES
NAME: KRISTY NUÑEZ   DOS: 2022  : 1960  PCP: Margarita Lynch MD    Chief Complaint:    Chief Complaint   Patient presents with   • Neck and left arm pain   • Low back and left leg pain       History of Present Illness:  61 y.o. female is here today for follow up. She was seen in our office in September of last year for evaluation of 1-2 year history neck pain radiating into her left shoulder and deltoid with intermittent bilateral numbness and tingling in her fingers. She was evaluated and MRI of the cervical spine was ordered. She also had EMG/NCV of her bilateral upper extremities. She is here today for review of these studies. She denies any worsening of her symptoms in her neck or arm since her previous visit.  She las left arm pain in her shoulder and deltoid which is constant. The numbness and tingling in her fingers is often present in the morning, but is not constant. She does not have any particular pattern of what makes it better or worse.       She was also seen for back pain with radiation into her anterolateral left thigh, extending below the knee but not into the foot on the left. This has been present for many years, per the patient,and she has had physical therapy in the past as well as epidural steroid injections in the past. She is currently on plavix for CAD and has not been able to do the ROSHAN since starting on the anticoagulant. She notes that the leg pain has become much worse over the last couple of years and affects all her activities of daily living. She cannot stand or walk for long periods of time which for her is more than 15 min. She Is able to do grocery shopping only if in a bent-forward posture over the shopping cart. She usually sits as much as possible to work in her kitchen, etc. She has a cane she brings with her for longer outings, but tries to avoid using it.   Her leg pain is worse with standing, better with leaning forward or lying flat. She denies weakness  or falls. She denies bowel/bladder dysfunction or saddle anesthesia.     She takes Norco, gabapentin, and muscle relaxant that she gets from her pain management doctor      PMX  Allergies:  No Known Allergies  Medications    Current Outpatient Medications:   •  amLODIPine (NORVASC) 10 MG tablet, Take 10 mg by mouth Daily., Disp: , Rfl:   •  aspirin 325 MG tablet, Take 1 tablet by mouth., Disp: , Rfl:   •  atorvastatin (LIPITOR) 40 MG tablet, Take 40 mg by mouth Every Night., Disp: , Rfl:   •  busPIRone (BUSPAR) 5 MG tablet, Take 5 mg by mouth 2 (Two) Times a Day., Disp: , Rfl:   •  clopidogrel (PLAVIX) 75 MG tablet, Take 75 mg by mouth Daily., Disp: , Rfl:   •  escitalopram (LEXAPRO) 20 MG tablet, Take 20 mg by mouth Daily., Disp: , Rfl:   •  ezetimibe (ZETIA) 10 MG tablet, Take 1 tablet by mouth Daily., Disp: , Rfl:   •  gabapentin (NEURONTIN) 400 MG capsule, , Disp: , Rfl:   •  hydrALAZINE (APRESOLINE) 25 MG tablet, Take 25 mg by mouth Daily As Needed., Disp: , Rfl:   •  HYDROcodone-acetaminophen (NORCO) 7.5-325 MG per tablet, , Disp: , Rfl:   •  levETIRAcetam (KEPPRA) 500 MG tablet, Take 500 mg by mouth 2 (Two) Times a Day., Disp: , Rfl:   •  losartan (COZAAR) 100 MG tablet, Take 100 mg by mouth Daily., Disp: , Rfl:   •  meclizine (ANTIVERT) 25 MG tablet, Take  by mouth See Admin Instructions. Take 1 tablet by mouth every 6-8 hours as needed, Disp: , Rfl:   •  meloxicam (MOBIC) 15 MG tablet, Take 15 mg by mouth Daily., Disp: , Rfl:   •  metoprolol succinate XL (TOPROL-XL) 100 MG 24 hr tablet, Take 1.5 tablets by mouth., Disp: , Rfl:   •  omeprazole (priLOSEC) 20 MG capsule, Take 20 mg by mouth Daily., Disp: , Rfl:   •  potassium chloride 10 MEQ CR tablet, Take 10 mEq by mouth Daily., Disp: , Rfl:   •  promethazine (PHENERGAN) 12.5 MG tablet, TAKE 1 TABLET BY MOUTH EVERY DAY AS NEEDED FORNAUSEA AND VOMITING, Disp: , Rfl:   •  spironolactone (ALDACTONE) 25 MG tablet, Take 25 mg by mouth Daily., Disp: , Rfl:   •   tiZANidine (ZANAFLEX) 4 MG tablet, , Disp: , Rfl:   •  traZODone (DESYREL) 50 MG tablet, Take 50 mg by mouth Every Night., Disp: , Rfl:   •  Vascepa 1 g capsule capsule, Take 2 capsules by mouth 2 (Two) Times a Day., Disp: , Rfl:   •  vitamin B-12 (CYANOCOBALAMIN) 1000 MCG tablet, Take 1,000 mcg by mouth Daily., Disp: , Rfl:   Past Medical History:  Past Medical History:   Diagnosis Date   • Arthritis    • Chronic kidney disease    • Headache    • Heart disease    • Heart failure (HCC)    • Hypertension    • Low back pain    • Osteoporosis    • Recent urinary tract infection    • Seizures (HCC)      Past Surgical History:  Past Surgical History:   Procedure Laterality Date   •  SECTION     • COLONOSCOPY       Social Hx:  Social History     Tobacco Use   • Smoking status: Current Every Day Smoker     Packs/day: 1.50     Types: Cigarettes   • Smokeless tobacco: Never Used   Vaping Use   • Vaping Use: Never used   Substance Use Topics   • Alcohol use: Yes     Comment: Occ wine   • Drug use: Never     Family Hx:  Family History   Problem Relation Age of Onset   • Arthritis Mother    • Heart disease Mother    • Hypertension Mother    • Diabetes Mother    • Asthma Father    • Heart disease Father    • Hypertension Father    • Kidney disease Father    • Kidney disease Sister      Review of Systems:        Review of Systems   Constitutional: Negative for activity change, appetite change, chills, diaphoresis, fatigue, fever and unexpected weight change.   HENT: Negative for congestion, dental problem, drooling, ear discharge, ear pain, facial swelling, hearing loss, mouth sores, nosebleeds, postnasal drip, rhinorrhea, sinus pressure, sinus pain, sneezing, sore throat, tinnitus, trouble swallowing and voice change.    Eyes: Positive for discharge. Negative for photophobia, pain, redness, itching and visual disturbance.   Respiratory: Positive for apnea. Negative for cough, choking, chest tightness, shortness of  breath, wheezing and stridor.    Cardiovascular: Negative for chest pain, palpitations and leg swelling.   Gastrointestinal: Negative for abdominal distention, abdominal pain, anal bleeding, blood in stool, constipation, diarrhea, nausea, rectal pain and vomiting.   Endocrine: Positive for polydipsia. Negative for cold intolerance, heat intolerance, polyphagia and polyuria.   Genitourinary: Positive for flank pain. Negative for decreased urine volume, difficulty urinating, dysuria, enuresis, frequency, genital sores, hematuria and urgency.   Musculoskeletal: Positive for arthralgias, back pain, myalgias, neck pain and neck stiffness. Negative for gait problem and joint swelling.   Skin: Negative for color change, pallor, rash and wound.   Allergic/Immunologic: Negative for environmental allergies, food allergies and immunocompromised state.   Neurological: Positive for dizziness, seizures and numbness. Negative for tremors, syncope, facial asymmetry, speech difficulty, weakness, light-headedness and headaches.   Hematological: Negative for adenopathy. Does not bruise/bleed easily.   Psychiatric/Behavioral: Negative for agitation, behavioral problems, confusion, decreased concentration, dysphoric mood, hallucinations, self-injury, sleep disturbance and suicidal ideas. The patient is nervous/anxious. The patient is not hyperactive.    All other systems reviewed and are negative.           Physical Examination:  Vitals:    02/09/22 0855   BP: 125/70   Pulse: 65   Temp: 97.7 °F (36.5 °C)   SpO2: 99%      General Appearance:   Well developed, well nourished, well groomed, alert, and cooperative.  Neurological examination:  Neurologic Exam   CONSTITUTIONAL:   - Pleasant and appears stated age.  PSYCHIATRIC:  - Normal mood and affect  - Behavior is normal.  - Thought content is normal  HENT:   Head: Normocephalic and Atraumatic.   Eyes:     - Pupils are equal, round, and reactive to light.     - EOM are normal.    PULMONARY:   - Speaking in full sentences  - No use of accessory muscles   - Breathing is non-labored   - No wheezing   SKIN:  - Clean, dry and intact   MUSCULOSKELETAL:  -Gait is slow an antalgic, but steady without weakness. She has difficulty transitioning from sitting to standing and making turns within the exam room.   - Straight leg raise is negative   NEUROLOGICAL:  - A&Ox3  - Attention span, language function, and cognition are intact.  - Strength is intact in the upper and lower extremities to direct testing.  - Muscle tone is normal throughout.  - Station and gait are normal.  - Sensation is intact to light touch testing throughout.  - Deep tendon reflexes are 2+ on the right, 3+ on the left   - Hernandez's Sign is negative bilaterally.  - No clonus is elicited.  - Coordination is intact.     B/L UE CTS/CuTS Assessment:  - (+) Tinels: bilateral carpal and ulnar   Upper extremity and  strength intact      Review of Imaging/DATA:  At her last visit, MRI of the cervical spine as well as of the lumbar spine was ordered.  Insurance denied the lumbar spine MRI and she is here with the cervical only.  Per patient's report, it is her back and leg pain that is most limiting for her.  Her cervical issues are much less of a concern at this time    MRI of the cervical spine shows multilevel degenerative changes with disc herniation.  Worst levels are C5-6 and C6-7 where these degenerative changes are causing mild to moderate neural foraminal stenosis.  This is worse on the left.  No severe central canal stenosis is noted. MRI was reviewed at length with the patient in the exam room and also reviewed with Dr. Hunt.     EMG/NCV of bilateral upper extremities shows mild to moderate carpal tunnel in both hands and mild ulnar nerve entrapment on the right    Flexion-extension x-rays of the lumbar spine were performed today.  This shows a grade 1 anterolisthesis at L4-5 that has some mild instability on flexion and  extension views        Diagnoses/Plan:    Ms. Waller is a 61 y.o. female with degenerative changes in her neck as well as in her back.  Her back and left leg pain are particularly bothersome and have been getting worse over time.  We will send her for flexion-extension x-rays of the lumbar spine today which showed some anterolisthesis with likely instability  We will try some physical therapy of both her neck and back, which she has not had for some time.  We will see her back in about 3 weeks for reevaluation.  At some point we will need MRI of the lumbar spine, especially in light of her xray findings.     Miya Perdomo PA-C

## 2022-10-15 ENCOUNTER — APPOINTMENT (OUTPATIENT)
Dept: GENERAL RADIOLOGY | Facility: HOSPITAL | Age: 62
End: 2022-10-15

## 2022-10-15 ENCOUNTER — APPOINTMENT (OUTPATIENT)
Dept: CT IMAGING | Facility: HOSPITAL | Age: 62
End: 2022-10-15

## 2022-10-15 ENCOUNTER — APPOINTMENT (OUTPATIENT)
Dept: MRI IMAGING | Facility: HOSPITAL | Age: 62
End: 2022-10-15

## 2022-10-15 ENCOUNTER — HOSPITAL ENCOUNTER (OUTPATIENT)
Facility: HOSPITAL | Age: 62
Setting detail: OBSERVATION
Discharge: HOME | End: 2022-10-17
Attending: EMERGENCY MEDICINE | Admitting: HOSPITALIST

## 2022-10-15 DIAGNOSIS — G45.9 TIA (TRANSIENT ISCHEMIC ATTACK): ICD-10-CM

## 2022-10-15 DIAGNOSIS — R41.82 ALTERED MENTAL STATUS, UNSPECIFIED ALTERED MENTAL STATUS TYPE: Primary | ICD-10-CM

## 2022-10-15 DIAGNOSIS — R56.9 SEIZURES: ICD-10-CM

## 2022-10-15 DIAGNOSIS — Z87.898 HISTORY OF SEIZURE: ICD-10-CM

## 2022-10-15 LAB
ALBUMIN SERPL-MCNC: 4 G/DL (ref 3.5–5.2)
ALBUMIN/GLOB SERPL: 1.7 G/DL
ALP SERPL-CCNC: 87 U/L (ref 39–117)
ALT SERPL W P-5'-P-CCNC: 6 U/L (ref 1–33)
ANION GAP SERPL CALCULATED.3IONS-SCNC: 14 MMOL/L (ref 5–15)
AST SERPL-CCNC: 11 U/L (ref 1–32)
BACTERIA UR QL AUTO: ABNORMAL /HPF
BASOPHILS # BLD AUTO: 0.09 10*3/MM3 (ref 0–0.2)
BASOPHILS NFR BLD AUTO: 1.2 % (ref 0–1.5)
BILIRUB SERPL-MCNC: 0.4 MG/DL (ref 0–1.2)
BILIRUB UR QL STRIP: NEGATIVE
BUN BLDA-MCNC: 12 MG/DL (ref 8–26)
BUN SERPL-MCNC: 10 MG/DL (ref 8–23)
BUN/CREAT SERPL: 12.5 (ref 7–25)
CA-I BLDA-SCNC: 1.16 MMOL/L (ref 1.2–1.32)
CALCIUM SPEC-SCNC: 8.3 MG/DL (ref 8.6–10.5)
CHLORIDE BLDA-SCNC: 101 MMOL/L (ref 98–109)
CHLORIDE SERPL-SCNC: 97 MMOL/L (ref 98–107)
CLARITY UR: ABNORMAL
CO2 BLDA-SCNC: 24 MMOL/L (ref 24–29)
CO2 SERPL-SCNC: 22 MMOL/L (ref 22–29)
COLOR UR: YELLOW
CREAT BLDA-MCNC: 0.8 MG/DL (ref 0.6–1.3)
CREAT SERPL-MCNC: 0.8 MG/DL (ref 0.57–1)
DEPRECATED RDW RBC AUTO: 46 FL (ref 37–54)
EGFRCR SERPLBLD CKD-EPI 2021: 82.9 ML/MIN/1.73
EGFRCR SERPLBLD CKD-EPI 2021: 82.9 ML/MIN/1.73
EOSINOPHIL # BLD AUTO: 0.01 10*3/MM3 (ref 0–0.4)
EOSINOPHIL NFR BLD AUTO: 0.1 % (ref 0.3–6.2)
ERYTHROCYTE [DISTWIDTH] IN BLOOD BY AUTOMATED COUNT: 13.7 % (ref 12.3–15.4)
GLOBULIN UR ELPH-MCNC: 2.3 GM/DL
GLUCOSE BLDC GLUCOMTR-MCNC: 71 MG/DL (ref 70–130)
GLUCOSE BLDC GLUCOMTR-MCNC: 77 MG/DL (ref 70–130)
GLUCOSE SERPL-MCNC: 71 MG/DL (ref 65–99)
GLUCOSE UR STRIP-MCNC: NEGATIVE MG/DL
HCT VFR BLD AUTO: 38.6 % (ref 34–46.6)
HCT VFR BLDA CALC: 45 % (ref 38–51)
HGB BLD-MCNC: 13.2 G/DL (ref 12–15.9)
HGB BLDA-MCNC: 15.3 G/DL (ref 12–17)
HGB UR QL STRIP.AUTO: NEGATIVE
HOLD SPECIMEN: NORMAL
HOLD SPECIMEN: NORMAL
HYALINE CASTS UR QL AUTO: ABNORMAL /LPF
IMM GRANULOCYTES # BLD AUTO: 0.01 10*3/MM3 (ref 0–0.05)
IMM GRANULOCYTES NFR BLD AUTO: 0.1 % (ref 0–0.5)
INR PPP: 1.1 (ref 0.8–1.2)
KETONES UR QL STRIP: ABNORMAL
LEUKOCYTE ESTERASE UR QL STRIP.AUTO: NEGATIVE
LYMPHOCYTES # BLD AUTO: 0.9 10*3/MM3 (ref 0.7–3.1)
LYMPHOCYTES NFR BLD AUTO: 11.9 % (ref 19.6–45.3)
MAGNESIUM SERPL-MCNC: 1.7 MG/DL (ref 1.6–2.4)
MCH RBC QN AUTO: 31.4 PG (ref 26.6–33)
MCHC RBC AUTO-ENTMCNC: 34.2 G/DL (ref 31.5–35.7)
MCV RBC AUTO: 91.9 FL (ref 79–97)
MONOCYTES # BLD AUTO: 0.47 10*3/MM3 (ref 0.1–0.9)
MONOCYTES NFR BLD AUTO: 6.2 % (ref 5–12)
NEUTROPHILS NFR BLD AUTO: 6.08 10*3/MM3 (ref 1.7–7)
NEUTROPHILS NFR BLD AUTO: 80.5 % (ref 42.7–76)
NITRITE UR QL STRIP: NEGATIVE
NRBC BLD AUTO-RTO: 0 /100 WBC (ref 0–0.2)
PH UR STRIP.AUTO: 6.5 [PH] (ref 5–8)
PLATELET # BLD AUTO: 444 10*3/MM3 (ref 140–450)
PMV BLD AUTO: 8.7 FL (ref 6–12)
POTASSIUM BLDA-SCNC: 3.9 MMOL/L (ref 3.5–4.9)
POTASSIUM SERPL-SCNC: 3.9 MMOL/L (ref 3.5–5.2)
PROCALCITONIN SERPL-MCNC: <0.02 NG/ML (ref 0–0.25)
PROT SERPL-MCNC: 6.3 G/DL (ref 6–8.5)
PROT UR QL STRIP: ABNORMAL
PROTHROMBIN TIME: 12.7 SECONDS (ref 12.8–15.2)
RBC # BLD AUTO: 4.2 10*6/MM3 (ref 3.77–5.28)
RBC # UR STRIP: ABNORMAL /HPF
REF LAB TEST METHOD: ABNORMAL
SODIUM BLD-SCNC: 137 MMOL/L (ref 138–146)
SODIUM SERPL-SCNC: 133 MMOL/L (ref 136–145)
SP GR UR STRIP: 1.03 (ref 1–1.03)
SQUAMOUS #/AREA URNS HPF: ABNORMAL /HPF
TROPONIN T SERPL-MCNC: <0.01 NG/ML (ref 0–0.03)
UROBILINOGEN UR QL STRIP: ABNORMAL
WBC # UR STRIP: ABNORMAL /HPF
WBC NRBC COR # BLD: 7.56 10*3/MM3 (ref 3.4–10.8)
WHOLE BLOOD HOLD COAG: NORMAL
WHOLE BLOOD HOLD SPECIMEN: NORMAL

## 2022-10-15 PROCEDURE — 0 IOPAMIDOL PER 1 ML: Performed by: EMERGENCY MEDICINE

## 2022-10-15 PROCEDURE — 82962 GLUCOSE BLOOD TEST: CPT

## 2022-10-15 PROCEDURE — 85014 HEMATOCRIT: CPT

## 2022-10-15 PROCEDURE — 80047 BASIC METABLC PNL IONIZED CA: CPT

## 2022-10-15 PROCEDURE — G0378 HOSPITAL OBSERVATION PER HR: HCPCS

## 2022-10-15 PROCEDURE — 83735 ASSAY OF MAGNESIUM: CPT | Performed by: EMERGENCY MEDICINE

## 2022-10-15 PROCEDURE — 0 LEVETIRACETAM IN NACL 0.75% 1000 MG/100ML SOLUTION: Performed by: EMERGENCY MEDICINE

## 2022-10-15 PROCEDURE — 84145 PROCALCITONIN (PCT): CPT | Performed by: INTERNAL MEDICINE

## 2022-10-15 PROCEDURE — 85610 PROTHROMBIN TIME: CPT

## 2022-10-15 PROCEDURE — 96375 TX/PRO/DX INJ NEW DRUG ADDON: CPT

## 2022-10-15 PROCEDURE — 80053 COMPREHEN METABOLIC PANEL: CPT | Performed by: EMERGENCY MEDICINE

## 2022-10-15 PROCEDURE — 81001 URINALYSIS AUTO W/SCOPE: CPT | Performed by: EMERGENCY MEDICINE

## 2022-10-15 PROCEDURE — 71045 X-RAY EXAM CHEST 1 VIEW: CPT

## 2022-10-15 PROCEDURE — 70551 MRI BRAIN STEM W/O DYE: CPT

## 2022-10-15 PROCEDURE — 99285 EMERGENCY DEPT VISIT HI MDM: CPT

## 2022-10-15 PROCEDURE — 36415 COLL VENOUS BLD VENIPUNCTURE: CPT

## 2022-10-15 PROCEDURE — 0042T HC CT CEREBRAL PERFUSION W/WO CONTRAST: CPT

## 2022-10-15 PROCEDURE — 85025 COMPLETE CBC W/AUTO DIFF WBC: CPT | Performed by: EMERGENCY MEDICINE

## 2022-10-15 PROCEDURE — 25010000002 LORAZEPAM PER 2 MG: Performed by: EMERGENCY MEDICINE

## 2022-10-15 PROCEDURE — 70498 CT ANGIOGRAPHY NECK: CPT

## 2022-10-15 PROCEDURE — 70496 CT ANGIOGRAPHY HEAD: CPT

## 2022-10-15 PROCEDURE — 70450 CT HEAD/BRAIN W/O DYE: CPT

## 2022-10-15 PROCEDURE — 87086 URINE CULTURE/COLONY COUNT: CPT | Performed by: INTERNAL MEDICINE

## 2022-10-15 PROCEDURE — 99220 PR INITIAL OBSERVATION CARE/DAY 70 MINUTES: CPT | Performed by: INTERNAL MEDICINE

## 2022-10-15 PROCEDURE — 99214 OFFICE O/P EST MOD 30 MIN: CPT

## 2022-10-15 PROCEDURE — 84484 ASSAY OF TROPONIN QUANT: CPT | Performed by: EMERGENCY MEDICINE

## 2022-10-15 RX ORDER — MAGNESIUM SULFATE HEPTAHYDRATE 40 MG/ML
2 INJECTION, SOLUTION INTRAVENOUS AS NEEDED
Status: DISCONTINUED | OUTPATIENT
Start: 2022-10-15 | End: 2022-10-17 | Stop reason: HOSPADM

## 2022-10-15 RX ORDER — NICOTINE 21 MG/24HR
1 PATCH, TRANSDERMAL 24 HOURS TRANSDERMAL
Status: DISCONTINUED | OUTPATIENT
Start: 2022-10-15 | End: 2022-10-17 | Stop reason: HOSPADM

## 2022-10-15 RX ORDER — ASPIRIN 300 MG/1
300 SUPPOSITORY RECTAL DAILY
Status: DISCONTINUED | OUTPATIENT
Start: 2022-10-16 | End: 2022-10-16

## 2022-10-15 RX ORDER — SODIUM CHLORIDE 9 MG/ML
75 INJECTION, SOLUTION INTRAVENOUS CONTINUOUS
Status: ACTIVE | OUTPATIENT
Start: 2022-10-15 | End: 2022-10-16

## 2022-10-15 RX ORDER — SODIUM CHLORIDE 0.9 % (FLUSH) 0.9 %
10 SYRINGE (ML) INJECTION AS NEEDED
Status: DISCONTINUED | OUTPATIENT
Start: 2022-10-15 | End: 2022-10-17 | Stop reason: HOSPADM

## 2022-10-15 RX ORDER — ACETAMINOPHEN 650 MG/1
650 SUPPOSITORY RECTAL EVERY 4 HOURS PRN
Status: DISCONTINUED | OUTPATIENT
Start: 2022-10-15 | End: 2022-10-17 | Stop reason: HOSPADM

## 2022-10-15 RX ORDER — ATORVASTATIN CALCIUM 40 MG/1
40 TABLET, FILM COATED ORAL NIGHTLY
Status: DISCONTINUED | OUTPATIENT
Start: 2022-10-16 | End: 2022-10-17

## 2022-10-15 RX ORDER — ASPIRIN 81 MG/1
81 TABLET, CHEWABLE ORAL DAILY
Status: DISCONTINUED | OUTPATIENT
Start: 2022-10-16 | End: 2022-10-16

## 2022-10-15 RX ORDER — ASPIRIN 81 MG/1
81 TABLET ORAL DAILY
COMMUNITY

## 2022-10-15 RX ORDER — MAGNESIUM SULFATE HEPTAHYDRATE 40 MG/ML
4 INJECTION, SOLUTION INTRAVENOUS AS NEEDED
Status: DISCONTINUED | OUTPATIENT
Start: 2022-10-15 | End: 2022-10-17 | Stop reason: HOSPADM

## 2022-10-15 RX ORDER — SODIUM CHLORIDE 0.9 % (FLUSH) 0.9 %
10 SYRINGE (ML) INJECTION EVERY 12 HOURS SCHEDULED
Status: DISCONTINUED | OUTPATIENT
Start: 2022-10-16 | End: 2022-10-17 | Stop reason: HOSPADM

## 2022-10-15 RX ORDER — ACETAMINOPHEN 325 MG/1
650 TABLET ORAL EVERY 4 HOURS PRN
Status: DISCONTINUED | OUTPATIENT
Start: 2022-10-15 | End: 2022-10-17 | Stop reason: HOSPADM

## 2022-10-15 RX ORDER — LORAZEPAM 2 MG/ML
2 INJECTION INTRAMUSCULAR ONCE
Status: COMPLETED | OUTPATIENT
Start: 2022-10-15 | End: 2022-10-15

## 2022-10-15 RX ORDER — BUSPIRONE HYDROCHLORIDE 10 MG/1
5 TABLET ORAL 2 TIMES DAILY
Status: DISCONTINUED | OUTPATIENT
Start: 2022-10-16 | End: 2022-10-17 | Stop reason: HOSPADM

## 2022-10-15 RX ORDER — ESCITALOPRAM OXALATE 20 MG/1
20 TABLET ORAL DAILY
Status: DISCONTINUED | OUTPATIENT
Start: 2022-10-16 | End: 2022-10-17 | Stop reason: HOSPADM

## 2022-10-15 RX ORDER — PANTOPRAZOLE SODIUM 40 MG/1
40 TABLET, DELAYED RELEASE ORAL
Status: DISCONTINUED | OUTPATIENT
Start: 2022-10-16 | End: 2022-10-17 | Stop reason: HOSPADM

## 2022-10-15 RX ORDER — GABAPENTIN 400 MG/1
400 CAPSULE ORAL 3 TIMES DAILY
Status: DISCONTINUED | OUTPATIENT
Start: 2022-10-16 | End: 2022-10-17 | Stop reason: HOSPADM

## 2022-10-15 RX ORDER — CLOPIDOGREL BISULFATE 75 MG/1
75 TABLET ORAL DAILY
Status: DISCONTINUED | OUTPATIENT
Start: 2022-10-16 | End: 2022-10-16

## 2022-10-15 RX ORDER — ACETAMINOPHEN 160 MG/5ML
650 SOLUTION ORAL EVERY 4 HOURS PRN
Status: DISCONTINUED | OUTPATIENT
Start: 2022-10-15 | End: 2022-10-17 | Stop reason: HOSPADM

## 2022-10-15 RX ORDER — LEVETIRACETAM 10 MG/ML
1000 INJECTION INTRAVASCULAR ONCE
Status: COMPLETED | OUTPATIENT
Start: 2022-10-15 | End: 2022-10-15

## 2022-10-15 RX ADMIN — LORAZEPAM 2 MG: 2 INJECTION INTRAMUSCULAR; INTRAVENOUS at 20:18

## 2022-10-15 RX ADMIN — LEVETIRACETAM 1000 MG: 10 INJECTION INTRAVASCULAR at 21:00

## 2022-10-15 RX ADMIN — IOPAMIDOL 115 ML: 755 INJECTION, SOLUTION INTRAVENOUS at 20:18

## 2022-10-16 ENCOUNTER — APPOINTMENT (OUTPATIENT)
Dept: CARDIOLOGY | Facility: HOSPITAL | Age: 62
End: 2022-10-16

## 2022-10-16 LAB
ANION GAP SERPL CALCULATED.3IONS-SCNC: 15 MMOL/L (ref 5–15)
AV HCM GRAD VALS: 22 MMHG
AV LVOT PEAK GRADIENT: 20 MMHG
BASOPHILS # BLD AUTO: 0.11 10*3/MM3 (ref 0–0.2)
BASOPHILS NFR BLD AUTO: 1.4 % (ref 0–1.5)
BH CV ECHO MEAS - AO MAX PG: 11.5 MMHG
BH CV ECHO MEAS - AO MEAN PG: 5 MMHG
BH CV ECHO MEAS - AO ROOT DIAM: 2.7 CM
BH CV ECHO MEAS - AO V2 MAX: 169.4 CM/SEC
BH CV ECHO MEAS - AO V2 VTI: 28.7 CM
BH CV ECHO MEAS - AVA(I,D): 2.7 CM2
BH CV ECHO MEAS - EDV(CUBED): 28.4 ML
BH CV ECHO MEAS - EDV(MOD-SP2): 21.7 ML
BH CV ECHO MEAS - EDV(MOD-SP4): 44.6 ML
BH CV ECHO MEAS - EF(MOD-BP): 50.2 %
BH CV ECHO MEAS - EF(MOD-SP2): 43.3 %
BH CV ECHO MEAS - EF(MOD-SP4): 57.4 %
BH CV ECHO MEAS - ESV(CUBED): 9.3 ML
BH CV ECHO MEAS - ESV(MOD-SP2): 12.3 ML
BH CV ECHO MEAS - ESV(MOD-SP4): 19 ML
BH CV ECHO MEAS - FS: 30.9 %
BH CV ECHO MEAS - IVS/LVPW: 1 CM
BH CV ECHO MEAS - IVSD: 0.97 CM
BH CV ECHO MEAS - LA DIMENSION: 2.45 CM
BH CV ECHO MEAS - LAT PEAK E' VEL: 6.6 CM/SEC
BH CV ECHO MEAS - LV MASS(C)D: 80.7 GRAMS
BH CV ECHO MEAS - LV MAX PG: 6.2 MMHG
BH CV ECHO MEAS - LV MEAN PG: 2.9 MMHG
BH CV ECHO MEAS - LV V1 MAX: 124.9 CM/SEC
BH CV ECHO MEAS - LV V1 VTI: 24 CM
BH CV ECHO MEAS - LVIDD: 3.1 CM
BH CV ECHO MEAS - LVIDS: 2.11 CM
BH CV ECHO MEAS - LVOT AREA: 3.2 CM2
BH CV ECHO MEAS - LVOT DIAM: 2.01 CM
BH CV ECHO MEAS - LVPWD: 0.97 CM
BH CV ECHO MEAS - MED PEAK E' VEL: 6.5 CM/SEC
BH CV ECHO MEAS - MV A MAX VEL: 114.2 CM/SEC
BH CV ECHO MEAS - MV DEC SLOPE: 355.5 CM/SEC2
BH CV ECHO MEAS - MV DEC TIME: 0.31 MSEC
BH CV ECHO MEAS - MV E MAX VEL: 77.1 CM/SEC
BH CV ECHO MEAS - MV E/A: 0.68
BH CV ECHO MEAS - MV MAX PG: 4.8 MMHG
BH CV ECHO MEAS - MV MEAN PG: 2.18 MMHG
BH CV ECHO MEAS - MV P1/2T: 79.4 MSEC
BH CV ECHO MEAS - MV V2 VTI: 26.1 CM
BH CV ECHO MEAS - MVA(P1/2T): 2.8 CM2
BH CV ECHO MEAS - MVA(VTI): 2.9 CM2
BH CV ECHO MEAS - PA ACC TIME: 0.08 SEC
BH CV ECHO MEAS - PA PR(ACCEL): 41 MMHG
BH CV ECHO MEAS - SV(LVOT): 76.3 ML
BH CV ECHO MEAS - SV(MOD-SP2): 9.4 ML
BH CV ECHO MEAS - SV(MOD-SP4): 25.6 ML
BH CV ECHO MEAS - TAPSE (>1.6): 2.01 CM
BH CV ECHO MEASUREMENTS AVERAGE E/E' RATIO: 11.77
BH CV XLRA - RV BASE: 3.5 CM
BH CV XLRA - RV LENGTH: 5.3 CM
BH CV XLRA - RV MID: 3.3 CM
BH CV XLRA - TDI S': 14.7 CM/SEC
BUN SERPL-MCNC: 7 MG/DL (ref 8–23)
BUN/CREAT SERPL: 9.7 (ref 7–25)
CALCIUM SPEC-SCNC: 9.2 MG/DL (ref 8.6–10.5)
CHLORIDE SERPL-SCNC: 99 MMOL/L (ref 98–107)
CHOLEST SERPL-MCNC: 268 MG/DL (ref 0–200)
CO2 SERPL-SCNC: 22 MMOL/L (ref 22–29)
CREAT SERPL-MCNC: 0.72 MG/DL (ref 0.57–1)
D-LACTATE SERPL-SCNC: 0.8 MMOL/L (ref 0.5–2)
DEPRECATED RDW RBC AUTO: 46.6 FL (ref 37–54)
EGFRCR SERPLBLD CKD-EPI 2021: 94.1 ML/MIN/1.73
EOSINOPHIL # BLD AUTO: 0.04 10*3/MM3 (ref 0–0.4)
EOSINOPHIL NFR BLD AUTO: 0.5 % (ref 0.3–6.2)
ERYTHROCYTE [DISTWIDTH] IN BLOOD BY AUTOMATED COUNT: 13.7 % (ref 12.3–15.4)
GLUCOSE BLDC GLUCOMTR-MCNC: 72 MG/DL (ref 70–130)
GLUCOSE BLDC GLUCOMTR-MCNC: 78 MG/DL (ref 70–130)
GLUCOSE SERPL-MCNC: 67 MG/DL (ref 65–99)
HBA1C MFR BLD: 5.7 % (ref 4.8–5.6)
HCT VFR BLD AUTO: 43.5 % (ref 34–46.6)
HDLC SERPL-MCNC: 88 MG/DL (ref 40–60)
HGB BLD-MCNC: 14.7 G/DL (ref 12–15.9)
HOLD SPECIMEN: NORMAL
IMM GRANULOCYTES # BLD AUTO: 0.03 10*3/MM3 (ref 0–0.05)
IMM GRANULOCYTES NFR BLD AUTO: 0.4 % (ref 0–0.5)
LDLC SERPL CALC-MCNC: 144 MG/DL (ref 0–100)
LDLC/HDLC SERPL: 1.58 {RATIO}
LEFT ATRIUM VOLUME INDEX: 17.6 ML/M2
LV EF 2D ECHO EST: 60 %
LYMPHOCYTES # BLD AUTO: 1.55 10*3/MM3 (ref 0.7–3.1)
LYMPHOCYTES NFR BLD AUTO: 20.1 % (ref 19.6–45.3)
MAGNESIUM SERPL-MCNC: 3.5 MG/DL (ref 1.6–2.4)
MAXIMAL PREDICTED HEART RATE: 157 BPM
MCH RBC QN AUTO: 31.5 PG (ref 26.6–33)
MCHC RBC AUTO-ENTMCNC: 33.8 G/DL (ref 31.5–35.7)
MCV RBC AUTO: 93.3 FL (ref 79–97)
MONOCYTES # BLD AUTO: 0.6 10*3/MM3 (ref 0.1–0.9)
MONOCYTES NFR BLD AUTO: 7.8 % (ref 5–12)
NEUTROPHILS NFR BLD AUTO: 5.37 10*3/MM3 (ref 1.7–7)
NEUTROPHILS NFR BLD AUTO: 69.8 % (ref 42.7–76)
NRBC BLD AUTO-RTO: 0 /100 WBC (ref 0–0.2)
PA ADP PRP-ACNC: 130 PRU
PLATELET # BLD AUTO: 478 10*3/MM3 (ref 140–450)
PMV BLD AUTO: 9 FL (ref 6–12)
POTASSIUM SERPL-SCNC: 3.6 MMOL/L (ref 3.5–5.2)
RBC # BLD AUTO: 4.66 10*6/MM3 (ref 3.77–5.28)
SODIUM SERPL-SCNC: 136 MMOL/L (ref 136–145)
STRESS TARGET HR: 133 BPM
TRIGL SERPL-MCNC: 205 MG/DL (ref 0–150)
TSH SERPL DL<=0.05 MIU/L-ACNC: 1.19 UIU/ML (ref 0.27–4.2)
VLDLC SERPL-MCNC: 36 MG/DL (ref 5–40)
WBC NRBC COR # BLD: 7.7 10*3/MM3 (ref 3.4–10.8)

## 2022-10-16 PROCEDURE — 87040 BLOOD CULTURE FOR BACTERIA: CPT | Performed by: INTERNAL MEDICINE

## 2022-10-16 PROCEDURE — 83036 HEMOGLOBIN GLYCOSYLATED A1C: CPT

## 2022-10-16 PROCEDURE — G0378 HOSPITAL OBSERVATION PER HR: HCPCS

## 2022-10-16 PROCEDURE — 97161 PT EVAL LOW COMPLEX 20 MIN: CPT

## 2022-10-16 PROCEDURE — 82962 GLUCOSE BLOOD TEST: CPT

## 2022-10-16 PROCEDURE — 99214 OFFICE O/P EST MOD 30 MIN: CPT | Performed by: PSYCHIATRY & NEUROLOGY

## 2022-10-16 PROCEDURE — 92523 SPEECH SOUND LANG COMPREHEN: CPT

## 2022-10-16 PROCEDURE — 83605 ASSAY OF LACTIC ACID: CPT | Performed by: INTERNAL MEDICINE

## 2022-10-16 PROCEDURE — 93306 TTE W/DOPPLER COMPLETE: CPT | Performed by: INTERNAL MEDICINE

## 2022-10-16 PROCEDURE — 25010000002 LEVETRIRACETAM PER 10 MG: Performed by: INTERNAL MEDICINE

## 2022-10-16 PROCEDURE — 96376 TX/PRO/DX INJ SAME DRUG ADON: CPT

## 2022-10-16 PROCEDURE — 93306 TTE W/DOPPLER COMPLETE: CPT

## 2022-10-16 PROCEDURE — 97535 SELF CARE MNGMENT TRAINING: CPT

## 2022-10-16 PROCEDURE — 99225 PR SBSQ OBSERVATION CARE/DAY 25 MINUTES: CPT | Performed by: HOSPITALIST

## 2022-10-16 PROCEDURE — 96361 HYDRATE IV INFUSION ADD-ON: CPT

## 2022-10-16 PROCEDURE — 83735 ASSAY OF MAGNESIUM: CPT | Performed by: INTERNAL MEDICINE

## 2022-10-16 PROCEDURE — 85576 BLOOD PLATELET AGGREGATION: CPT

## 2022-10-16 PROCEDURE — 0 MAGNESIUM SULFATE 4 GM/100ML SOLUTION: Performed by: INTERNAL MEDICINE

## 2022-10-16 PROCEDURE — 80048 BASIC METABOLIC PNL TOTAL CA: CPT | Performed by: INTERNAL MEDICINE

## 2022-10-16 PROCEDURE — 80061 LIPID PANEL: CPT

## 2022-10-16 PROCEDURE — 85025 COMPLETE CBC W/AUTO DIFF WBC: CPT | Performed by: INTERNAL MEDICINE

## 2022-10-16 PROCEDURE — 96366 THER/PROPH/DIAG IV INF ADDON: CPT

## 2022-10-16 PROCEDURE — 96365 THER/PROPH/DIAG IV INF INIT: CPT

## 2022-10-16 PROCEDURE — 96367 TX/PROPH/DG ADDL SEQ IV INF: CPT

## 2022-10-16 PROCEDURE — 25010000002 CEFTRIAXONE PER 250 MG: Performed by: INTERNAL MEDICINE

## 2022-10-16 PROCEDURE — 84443 ASSAY THYROID STIM HORMONE: CPT | Performed by: INTERNAL MEDICINE

## 2022-10-16 PROCEDURE — 97165 OT EVAL LOW COMPLEX 30 MIN: CPT

## 2022-10-16 RX ORDER — LOSARTAN POTASSIUM 50 MG/1
100 TABLET ORAL DAILY
Status: DISCONTINUED | OUTPATIENT
Start: 2022-10-16 | End: 2022-10-17 | Stop reason: HOSPADM

## 2022-10-16 RX ORDER — CLOPIDOGREL BISULFATE 75 MG/1
75 TABLET ORAL DAILY
Status: DISCONTINUED | OUTPATIENT
Start: 2022-10-17 | End: 2022-10-17 | Stop reason: HOSPADM

## 2022-10-16 RX ORDER — SPIRONOLACTONE 25 MG/1
25 TABLET ORAL DAILY
Status: DISCONTINUED | OUTPATIENT
Start: 2022-10-16 | End: 2022-10-17 | Stop reason: HOSPADM

## 2022-10-16 RX ORDER — ASPIRIN 81 MG/1
81 TABLET ORAL DAILY
Status: DISCONTINUED | OUTPATIENT
Start: 2022-10-17 | End: 2022-10-17 | Stop reason: HOSPADM

## 2022-10-16 RX ORDER — AMLODIPINE BESYLATE 10 MG/1
10 TABLET ORAL DAILY
Status: DISCONTINUED | OUTPATIENT
Start: 2022-10-16 | End: 2022-10-17 | Stop reason: HOSPADM

## 2022-10-16 RX ADMIN — LEVETIRACETAM 750 MG: 100 INJECTION, SOLUTION INTRAVENOUS at 10:07

## 2022-10-16 RX ADMIN — SPIRONOLACTONE 25 MG: 25 TABLET ORAL at 12:28

## 2022-10-16 RX ADMIN — GABAPENTIN 400 MG: 400 CAPSULE ORAL at 16:28

## 2022-10-16 RX ADMIN — SODIUM CHLORIDE 75 ML/HR: 9 INJECTION, SOLUTION INTRAVENOUS at 00:10

## 2022-10-16 RX ADMIN — ESCITALOPRAM OXALATE 20 MG: 20 TABLET ORAL at 08:21

## 2022-10-16 RX ADMIN — ACETAMINOPHEN 650 MG: 325 TABLET ORAL at 12:35

## 2022-10-16 RX ADMIN — Medication 10 ML: at 21:14

## 2022-10-16 RX ADMIN — Medication 1 PATCH: at 00:12

## 2022-10-16 RX ADMIN — BUSPIRONE HYDROCHLORIDE 5 MG: 10 TABLET ORAL at 10:07

## 2022-10-16 RX ADMIN — LEVETIRACETAM 750 MG: 100 INJECTION, SOLUTION INTRAVENOUS at 21:13

## 2022-10-16 RX ADMIN — Medication 10 ML: at 00:12

## 2022-10-16 RX ADMIN — ACETAMINOPHEN 650 MG: 325 TABLET ORAL at 21:24

## 2022-10-16 RX ADMIN — AMLODIPINE BESYLATE 10 MG: 10 TABLET ORAL at 12:28

## 2022-10-16 RX ADMIN — SODIUM CHLORIDE 1 G: 900 INJECTION INTRAVENOUS at 00:11

## 2022-10-16 RX ADMIN — GABAPENTIN 400 MG: 400 CAPSULE ORAL at 21:14

## 2022-10-16 RX ADMIN — BUSPIRONE HYDROCHLORIDE 5 MG: 10 TABLET ORAL at 21:14

## 2022-10-16 RX ADMIN — MAGNESIUM SULFATE HEPTAHYDRATE 4 G: 40 INJECTION, SOLUTION INTRAVENOUS at 00:49

## 2022-10-16 RX ADMIN — LOSARTAN POTASSIUM 100 MG: 50 TABLET, FILM COATED ORAL at 12:28

## 2022-10-16 RX ADMIN — ASPIRIN 81 MG CHEWABLE TABLET 81 MG: 81 TABLET CHEWABLE at 08:20

## 2022-10-16 RX ADMIN — BUSPIRONE HYDROCHLORIDE 5 MG: 10 TABLET ORAL at 00:12

## 2022-10-16 RX ADMIN — CLOPIDOGREL BISULFATE 75 MG: 75 TABLET ORAL at 08:21

## 2022-10-16 RX ADMIN — SODIUM CHLORIDE 1 G: 900 INJECTION INTRAVENOUS at 21:14

## 2022-10-16 RX ADMIN — ATORVASTATIN CALCIUM 40 MG: 40 TABLET, FILM COATED ORAL at 00:12

## 2022-10-16 RX ADMIN — Medication 10 ML: at 08:21

## 2022-10-16 RX ADMIN — GABAPENTIN 400 MG: 400 CAPSULE ORAL at 08:21

## 2022-10-16 RX ADMIN — PANTOPRAZOLE SODIUM 40 MG: 40 TABLET, DELAYED RELEASE ORAL at 05:23

## 2022-10-16 RX ADMIN — Medication 10 ML: at 00:13

## 2022-10-16 RX ADMIN — METOPROLOL SUCCINATE 150 MG: 100 TABLET, FILM COATED, EXTENDED RELEASE ORAL at 12:28

## 2022-10-16 RX ADMIN — ATORVASTATIN CALCIUM 40 MG: 40 TABLET, FILM COATED ORAL at 21:25

## 2022-10-16 RX ADMIN — SODIUM CHLORIDE 1000 ML: 9 INJECTION, SOLUTION INTRAVENOUS at 00:10

## 2022-10-16 NOTE — PROGRESS NOTES
Subjective:    CC: Gabriela Waller is seen today for seizures    HPI:  61-year-old female with a history of stroke several years ago with no residual deficits, hypertension, CAD, seizure disorder presents with breakthrough seizure.  As per patient she has had seizures for the past 5 to 6 years.  She was started on Keppra a few years ago but is noncompliant with it often taking it just once a day.  Even yesterday she missed her dose of Keppra.  With her episodes she mostly has pulling of her head to the right side followed by stiffening and whole body shaking.  She had 1 episode last week and another 1 yesterday with difficulty talking and extension of her left arm.  In the hospital she has had an extensive stroke work-up including a CT head and MRI brain that shows chronic infarcts in the left cerebellum, left occipital region and left parietal region but no acute abnormalities.  CT angiogram of the head and neck that shows bilateral ICA plaques but no significant stenosis.  She is on both aspirin, Plavix and Lipitor 4mg.  Her LDL was 144, P2 Y12 was 130 and A1c was 5.7.  Echocardiogram and EEG are both pending.  She also has a mild UTI and has been started on Rocephin.  Of note-I personally reviewed her MRI brain      Current Facility-Administered Medications:   •  ! [facility] medications [facility], contact Pharmacy prior to discharge to return to patient, , Does not apply, Daily, Saran Borja IV, PharmD  •  acetaminophen (TYLENOL) tablet 650 mg, 650 mg, Oral, Q4H PRN, 650 mg at 10/16/22 1235 **OR** acetaminophen (TYLENOL) 160 MG/5ML solution 650 mg, 650 mg, Oral, Q4H PRN **OR** acetaminophen (TYLENOL) suppository 650 mg, 650 mg, Rectal, Q4H PRN, Adriana Byrnes DO  •  amLODIPine (NORVASC) tablet 10 mg, 10 mg, Oral, Daily, Jesika Fields DO, 10 mg at 10/16/22 1228  •  [START ON 10/17/2022] aspirin EC tablet 81 mg, 81 mg, Oral, Daily, Jesika Fields DO  •  atorvastatin (LIPITOR) tablet 40  mg, 40 mg, Oral, Nightly, Annalisa Frias, APRN, 40 mg at 10/16/22 0012  •  busPIRone (BUSPAR) tablet 5 mg, 5 mg, Oral, BID, Soniya, Adriana G, DO, 5 mg at 10/16/22 1007  •  cefTRIAXone (ROCEPHIN) 1 g/100 mL 0.9% NS (MBP), 1 g, Intravenous, Q24H, Soniya, Adriana G, DO, Stopped at 10/16/22 0052  •  [START ON 10/17/2022] clopidogrel (PLAVIX) tablet 75 mg, 75 mg, Oral, Daily, Nan Fieldsin, DO  •  escitalopram (LEXAPRO) tablet 20 mg, 20 mg, Oral, Daily, Soniya, Adriana G, DO, 20 mg at 10/16/22 0821  •  gabapentin (NEURONTIN) capsule 400 mg, 400 mg, Oral, TID, Soniya, Adriana G, DO, 400 mg at 10/16/22 0821  •  levETIRAcetam (KEPPRA) 750 mg in sodium chloride 0.9 % 100 mL IVPB, 750 mg, Intravenous, Q12H, Soniya, Adriana G, DO, Last Rate: 400 mL/hr at 10/16/22 1227, Currently Infusing at 10/16/22 1227  •  losartan (COZAAR) tablet 100 mg, 100 mg, Oral, Daily, Jesika Fields, DO, 100 mg at 10/16/22 1228  •  Magnesium Sulfate 2 gram Bolus, followed by 8 gram infusion (total Mg dose 10 grams)- Mg less than or equal to 1mg/dL, 2 g, Intravenous, PRN **OR** Magnesium Sulfate 2 gram / 50mL Infusion (GIVE X 3 BAGS TO EQUAL 6GM TOTAL DOSE) - Mg 1.1 - 1.5 mg/dl, 2 g, Intravenous, PRN **OR** Magnesium Sulfate 4 gram infusion- Mg 1.6-1.9 mg/dL, 4 g, Intravenous, PRN, Soniya, Adriana G, DO, Stopped at 10/16/22 0513  •  metoprolol succinate XL (TOPROL-XL) 24 hr tablet 150 mg, 150 mg, Oral, Q24H, Donnie, Jesika, DO, 150 mg at 10/16/22 1228  •  nicotine (NICODERM CQ) 21 MG/24HR patch 1 patch, 1 patch, Transdermal, Q24H, Adriana Byrnes, DO, 1 patch at 10/16/22 0012  •  pantoprazole (PROTONIX) EC tablet 40 mg, 40 mg, Oral, Q AM, Adriana Byrnes, DO, 40 mg at 10/16/22 0523  •  sodium chloride 0.9 % flush 10 mL, 10 mL, Intravenous, PRN, Adriana Byrnes, DO  •  sodium chloride 0.9 % flush 10 mL, 10 mL, Intravenous, Q12H, Annalisa Frias L, APRN, 10 mL at 10/16/22 0013  •  sodium chloride 0.9 % flush 10 mL, 10 mL, Intravenous, PRN, Annalisa Frias  "L, APRN  •  sodium chloride 0.9 % flush 10 mL, 10 mL, Intravenous, Q12H, Soniya, Adriana G, DO, 10 mL at 10/16/22 0821  •  sodium chloride 0.9 % flush 10 mL, 10 mL, Intravenous, PRN, Soniya, Adriana G, DO  •  spironolactone (ALDACTONE) tablet 25 mg, 25 mg, Oral, Daily, Jesika Fields, DO, 25 mg at 10/16/22 1228       Past Medical History:   Diagnosis Date   • Arthritis    • Chronic kidney disease    • Headache    • Heart disease    • Heart failure (HCC)    • Hypertension    • Low back pain    • Osteoporosis    • Recent urinary tract infection    • Seizures (HCC)         Past Surgical History:   Procedure Laterality Date   •  SECTION     • COLONOSCOPY          Family History   Problem Relation Age of Onset   • Arthritis Mother    • Heart disease Mother    • Hypertension Mother    • Diabetes Mother    • Asthma Father    • Heart disease Father    • Hypertension Father    • Kidney disease Father    • Kidney disease Sister         Social History     Socioeconomic History   • Marital status:    Tobacco Use   • Smoking status: Every Day     Packs/day: 1.50     Types: Cigarettes   • Smokeless tobacco: Never   Vaping Use   • Vaping Use: Never used   Substance and Sexual Activity   • Alcohol use: Yes     Comment: Occ wine   • Drug use: Never   • Sexual activity: Defer       Review of Systems Pertinent items are noted in HPI, all other systems reviewed and negative     Objective:    /88 (BP Location: Right arm, Patient Position: Lying)   Pulse 96   Temp 97.4 °F (36.3 °C) (Oral)   Resp 20   Ht 152.4 cm (60\")   Wt 56.7 kg (125 lb)   SpO2 95%   BMI 24.41 kg/m²       Neurology Exam:    General appearance: Sitting up in no acute distress    Mental status: Alert, awake and oriented to time place and person.  Could tell me the month, year and her ZIP Code    Recent and Remote memory: Intact.    Attention span and Concentration: Normal.     Language and Speech: Intact- No dysarthria.    Fluency, Naming, " Repetition and Comprehension:  Intact    Cranial Nerves:   CN II: Visual fields are full. Intact. Fundi - Normal, No papilledema, Pupils - RAFI  CN III, IV and VI: Extraocular movements are intact. Normal saccades.   CN V: Facial sensation is intact.   CN VII: Muscles of facial expression reveal no asymmetry. Intact.   CN VIII: Hearing is intact. Whispered voice intact.   CN IX and X: Palate elevates symmetrically. Intact  CN XI: Shoulder shrug is intact.   CN XII: Tongue is midline without evidence of atrophy or fasciculation.     Motor:  Right UE muscle strength 5/5. Normal tone.     Left UE muscle strength 5/5. Normal tone.      Right LE muscle strength5/5. Normal tone.     Left LE muscle strength 5/5. Normal tone.      Sensory: Normal light touch, vibration and pinprick sensation bilaterally.    DTRs: 2+ bilaterally in upper and lower extremities.    Babinski: Negative bilaterally.    Coordination: Normal finger-to-nose, heel to shin B/L.    Gait: Deferred    Ophthalmoscopic examination-no papilledema noted    Cardiac examination -normal rate and rhythm    Labs:  Most recent labs have been reviewed.    Results from last 7 days   Lab Units 10/16/22  0636   WBC 10*3/mm3 7.70   HEMOGLOBIN g/dL 14.7   HEMATOCRIT % 43.5   PLATELETS 10*3/mm3 478*     Results from last 7 days   Lab Units 10/16/22  0636   SODIUM mmol/L 136   POTASSIUM mmol/L 3.6   CHLORIDE mmol/L 99   CO2 mmol/L 22.0   BUN mg/dL 7*   CREATININE mg/dL 0.72   CALCIUM mg/dL 9.2       Radiology: CT Angiogram Neck    Result Date: 10/15/2022  1. Atherosclerosis without significant stenosis or occlusion of the major arteries of the head and neck. Basilar artery is patent. Likely artifact on earlier noncontrast head CT. 2. No perfusion defect.. Report called to stroke navigator lupe armando at 10/15/2022 8:52 PM Electronically signed by:  Palomo Doty M.D.  10/15/2022 6:54 PM Mountain Time    MRI Brain Without Contrast    Result Date: 10/15/2022  1.  No acute intracranial abnormality. Specifically, negative for acute or subacute infarct. 2. Chronic infarcts in the medial left parietal lobe, left occipital, and left inferior cerebellum. 3. Volume loss and mild chronic small vessel ischemic changes. Electronically signed by:  Manny Liang M.D.  10/15/2022 9:34 PM Mountain Time    XR Chest 1 View    Result Date: 10/15/2022  No acute cardiopulmonary abnormality. Electronically signed by:  Leland Patel M.D.  10/15/2022 7:15 PM Mountain Time    CT Head Without Contrast Stroke Protocol    Result Date: 10/15/2022  1. No acute intracranial hemorrhage. MRI is more sensitive for the detection of acute nonhemorrhagic infarct. 2. Questionable slight hyperdensity to the distal basilar artery. Artifact versus thrombus. Attention on CTA head. 3. Old infarcts in the left cerebellum, left parietal lobe medially and left occipital lobe inferiorly. MRI can best assess age and assess for any underlying acute infarct. Mild changes small vessel ischemic disease of indeterminate age, presumably mostly chronic. Volume loss. Atherosclerosis. 4. Trace mastoid effusions. Paranasal sinus disease. Report called to stroke navigator lupe armando at 10/15/2022 8:12 PM Electronically signed by:  Palomo Doty M.D.  10/15/2022 6:17 PM Mountain Time    CT Angiogram Head w AI Analysis of LVO    Result Date: 10/15/2022  1. Atherosclerosis without significant stenosis or occlusion of the major arteries of the head and neck. Basilar artery is patent. Likely artifact on earlier noncontrast head CT. 2. No perfusion defect.. Report called to stroke navigator lupe armando at 10/15/2022 8:52 PM Electronically signed by:  Palomo Doty M.D.  10/15/2022 6:54 PM Mountain Time    CT CEREBRAL PERFUSION WITH & WITHOUT CONTRAST    Result Date: 10/15/2022  1. Atherosclerosis without significant stenosis or occlusion of the major arteries of the head and neck. Basilar artery is patent. Likely  artifact on earlier noncontrast head CT. 2. No perfusion defect.. Report called to stroke navigator lupe armando at 10/15/2022 8:52 PM Electronically signed by:  Palomo Doty M.D.  10/15/2022 6:54 PM Mountain Time        Assessment and Plan:  61-year-old female with a history of strokes and seizures now presents with a breakthrough seizure    1.  Focal seizures  Based on her semiology patient most likely has focal seizures.  MRI brain shows multiple chronic infarcts but no acute abnormalities.  EEG is still pending  -I have increased her Keppra to 750 mg twice a day  -Counseled on medication compliance    2.  History of strokes  She has had multiple chronic infarcts in the left cerebellum, left occipital region and left parietal region which could be due to small vessel disease or cardioembolic.  -Echo is still pending  -Continue both aspirin and Plavix along with Lipitor 40 mg.  Goal LDL of less than 100.  Her LDL was 144  -Goal blood pressure less than 130/90  -She is a prediabetic for which she should be given dietary counseling  -Should also be provided with nicotine patches at the time of discharge    3.  Possible UTI  Culture still pending.  On Hebert Willard MD 10/16/22 13:44 EDT

## 2022-10-16 NOTE — PLAN OF CARE
Goal Outcome Evaluation:  Plan of Care Reviewed With: patient        Progress: no change  Outcome Evaluation: Pt. presents with impaired activity tolerance, balance, generalized weakness and a decline in ADL performance. Pt. demonstrates bed mobility with Min A and T/Fs with Min A x 2 and BUE support. Pt. DEP for toileting and LBD. Skilled OT services warranted to promote return to PLOF. Recommend IPR at [facility].

## 2022-10-16 NOTE — PROGRESS NOTES
[facility] Medicine Services  PROGRESS NOTE    Patient Name: Gabriela Waller  : 1960  MRN: [id number]    Date of Admission: 10/15/2022  Primary Care Physician: Margarita Lynch MD    Subjective   Subjective     CC:  Difficulty speaking    HPI:  Patient resting in bed, confused, but able to answer questions.    ROS:  Gen- No fevers, chills  CV- No chest pain, palpitations  Resp- No cough, dyspnea  GI- No N/V/D, abd pain    Objective   Objective     Vital Signs:   Temp:  [97.5 °F (36.4 °C)-99 °F (37.2 °C)] 97.5 °F (36.4 °C)  Heart Rate:  [] 79  Resp:  [14-20] 16  BP: (149-178)/() 162/86     Physical Exam:  Constitutional: No acute distress, awake, alert  HENT: NCAT, mucous membranes moist  Respiratory: Clear to auscultation bilaterally, respiratory effort normal   Cardiovascular: RRR, no murmurs, rubs, or gallops  Gastrointestinal: Positive bowel sounds, soft, nontender, nondistended  Musculoskeletal: No bilateral ankle edema  Psychiatric: Appropriate affect, cooperative  Neurologic: Oriented x 2, strength symmetric in all extremities, Cranial Nerves grossly intact to confrontation, speech clear  Skin: No rashes    Results Reviewed:  LAB RESULTS:      Lab 10/16/22  0636 10/16/22  0031 10/15/22  2035 10/15/22  2008 10/15/22  2007   WBC 7.70  --  7.56  --   --    HEMOGLOBIN 14.7  --  13.2  --   --    HEMOGLOBIN, POC  --   --   --  15.3  --    HEMATOCRIT 43.5  --  38.6  --   --    HEMATOCRIT POC  --   --   --  45  --    PLATELETS 478*  --  444  --   --    NEUTROS ABS 5.37  --  6.08  --   --    IMMATURE GRANS (ABS) 0.03  --  0.01  --   --    LYMPHS ABS 1.55  --  0.90  --   --    MONOS ABS 0.60  --  0.47  --   --    EOS ABS 0.04  --  0.01  --   --    MCV 93.3  --  91.9  --   --    PROCALCITONIN  --   --  <0.02  --   --    LACTATE  --  0.8  --   --   --    PROTIME  --   --   --   --  12.7*         Lab 10/16/22  0636 10/15/22  2035 10/15/22  2008   SODIUM 136 133*  --     POTASSIUM 3.6 3.9  --    CHLORIDE 99 97*  --    CO2 22.0 22.0  --    ANION GAP 15.0 14.0  --    BUN 7* 10  --    CREATININE 0.72 0.80 0.80   EGFR 94.1 82.9 82.9   GLUCOSE 67 71  --    CALCIUM 9.2 8.3*  --    MAGNESIUM 3.5* 1.7  --    HEMOGLOBIN A1C 5.70*  --   --    TSH 1.190  --   --          Lab 10/15/22  2035   TOTAL PROTEIN 6.3   ALBUMIN 4.00   GLOBULIN 2.3   ALT (SGPT) 6   AST (SGOT) 11   BILIRUBIN 0.4   ALK PHOS 87         Lab 10/15/22  2035 10/15/22  2007   TROPONIN T <0.010  --    PROTIME  --  12.7*   INR  --  1.1         Lab 10/16/22  0636   CHOLESTEROL 268*   LDL CHOL 144*   HDL CHOL 88*   TRIGLYCERIDES 205*             Brief Urine Lab Results  (Last result in the past 365 days)      Color   Clarity   Blood   Leuk Est   Nitrite   Protein   CREAT   Urine HCG        10/15/22 2045 Yellow   Cloudy   Negative   Negative   Negative   >=300 mg/dL (3+)                 Microbiology Results Abnormal     None          CT Angiogram Neck    Result Date: 10/15/2022  CTA HEAD WITH CONTRAST. CTA NECK WITH CONTRAST. CT PERFUSION BRAIN. DATE: 10/15/2022 6:07 PM  INDICATION:  Code stroke, left sided weakness, expressive aphasia.  COMPARISON: CT head from earlier today.  TECHNIQUE:   Thin section axial images through the head and neck after IV contrast. CT perfusion images through the brain. Two dimensional and three dimensional reformations. NASCET criteria used to determine stenosis.115 mL Isovue 370 given IV. Low-dose  CT acquisition technique included one of the following options: 1. Automated exposure control, 2. Adjustment of mA and/or KV according to patient's size and/or 3. Use of iterative reconstruction. FINDINGS:   CTA NECK: Aortic arch: Normal triple vessel anatomy. Atherosclerosis. Right common carotid artery: Normal origin. Calcified and noncalcified plaque distally. No significant stenosis, occlusion. Right internal carotid artery: Calcified plaque proximally less than 50% stenosis. Calcifications distally.  No significant stenosis, occlusion. Right vertebral artery: Normal origin. Calcifications distally. No significant stenosis, occlusion. Left common carotid artery: Normal origin. Scattered calcified and noncalcified plaque. No significant stenosis, occlusion. Left internal carotid artery: Calcified and noncalcified plaque proximally. Calcifications distally. No significant stenosis, occlusion. Left vertebral artery: Normal origin. Calcifications distally. No significant stenosis, occlusion. Parotid and submandibular glands are intact. Posterior nasopharynx, oropharynx, hypopharynx, larynx, thyroid gland, proximal trachea, imaged superior mediastinum  are patent. Lymph nodes in the neck do not meet the CT size criteria for lymphadenopathy. Degenerative changes of the spine. Centrilobular and paraseptal emphysema in the lung apices. CTA HEAD: No large aneurysm, significant stenosis or occlusion of the proximal major cerebral arteries. Major dural venous sinuses are patent. CT PERFUSION BRAIN: Cerebral blood flow, blood volume and mean transit time maps are symmetric without perfusion defect. CBF<30% vol: 0 mL Tmax>6s vol: 0 mL     Impression: 1. Atherosclerosis without significant stenosis or occlusion of the major arteries of the head and neck. Basilar artery is patent. Likely artifact on earlier noncontrast head CT. 2. No perfusion defect.. Report called to stroke navigator lupe armando at 10/15/2022 8:52 PM Electronically signed by:  Palomo Doty M.D.  10/15/2022 6:54 PM Mountain Time    MRI Brain Without Contrast    Result Date: 10/15/2022  EXAMINATION: MRI BRAIN WITHOUT CONTRAST.  DATE: 10/15/2022 8:52 PM  INDICATION: Neuro deficit, stroke suspected. Altered mental status.  COMPARISON: Head CT October 15, 2022.  TECHNIQUE:  Sagittal T1, axial T2, FLAIR, diffusion, T1, STIR, and coronal T1 images through the brain. No contrast. FINDINGS:  Intracranial contents: Motion degraded study. Mild generalized  volume loss. Focal regions of encephalomalacia in the medial left parietal lobe and occipital pole, as well as small chronic lacunar infarcts in the left inferior cerebellum. Patchy FLAIR hyperintensities in the periventricular white matter, most commonly related to chronic small vessel ischemic changes. There is no dominant mass or mass effect. No hydrocephalus or convincing extra-axial fluid collection. Flow voids are patent within the proximal portions the major intracranial arteries on T2 imaging. Diffusion sequence: No restricted diffusion to suggest acute ischemia. Blood sensitive sequence: No abnormal intracranial blood products. Extracranial soft tissues: Trace bilateral mastoid effusions. Well aerated paranasal sinuses. No acute abnormality in the imaged portions the orbits or globes.     Impression: 1. No acute intracranial abnormality. Specifically, negative for acute or subacute infarct. 2. Chronic infarcts in the medial left parietal lobe, left occipital, and left inferior cerebellum. 3. Volume loss and mild chronic small vessel ischemic changes. Electronically signed by:  Manny Liang M.D.  10/15/2022 9:34 PM Mountain Time    XR Chest 1 View    Result Date: 10/15/2022  FRONTAL VIEW OF THE CHEST CLINICAL INDICATION: Dizziness. COMPARISON: 9/13/2021. FINDINGS: No focal consolidation, pleural effusion or pneumothorax. Cardiomediastinal morphology is normal.     Impression: No acute cardiopulmonary abnormality. Electronically signed by:  Leland Patel M.D.  10/15/2022 7:15 PM Mountain Time    CT Head Without Contrast Stroke Protocol    Result Date: 10/15/2022  EXAMINATION: CT HEAD WITHOUT CONTRAST DATE: 10/15/2022 5:58 PM  INDICATION: Code stroke, expressive aphasia, left sided weakness, confusion.  COMPARISON: None.  TECHNIQUE:  Routine axial images through the head without contrast. Coronal reformations. Low-dose CT acquisition technique included one or more of the following options: 1. Automated  exposure control, 2. Adjustment of mA and/or KV according to patient's size and/or 3. Use of iterative reconstruction. FINDINGS:  Intracranial contents: Ventricular and cisternal spaces are prominent from volume loss. Mild periventricular and subcortical white matter hypodensities. Well-defined hypodensities in the left cerebellum with the larger focus measuring 5 x 10 mm. Hypodensity/encephalomalacia left parietal lobe medially measures 1.2 x 1.4 cm. Small area of encephalomalacia left occipital lobe inferiorly. No dominant mass, midline shift, hydrocephalus, extra axial fluid collection or acute hemorrhage. Distal basilar artery is slightly hyperdense.. Craniocervical junction is patent. Bones and extracranial soft tissues: Mild mucosal thickening ethmoid air cells, maxillary sinuses. Partial opacification right mastoid air cells greater than left. Otherwise, Visualized paranasal sinuses and mastoid air cells are clear.  Skull is intact. Visualized intraorbital contents are  unremarkable. Calcifications distal internal carotid arteries and distal vertebral arteries.     Impression: 1. No acute intracranial hemorrhage. MRI is more sensitive for the detection of acute nonhemorrhagic infarct. 2. Questionable slight hyperdensity to the distal basilar artery. Artifact versus thrombus. Attention on CTA head. 3. Old infarcts in the left cerebellum, left parietal lobe medially and left occipital lobe inferiorly. MRI can best assess age and assess for any underlying acute infarct. Mild changes small vessel ischemic disease of indeterminate age, presumably mostly chronic. Volume loss. Atherosclerosis. 4. Trace mastoid effusions. Paranasal sinus disease. Report called to stroke navigator lupe armando at 10/15/2022 8:12 PM Electronically signed by:  Palomo Doty M.D.  10/15/2022 6:17 PM Mountain Time    CT Angiogram Head w AI Analysis of LVO    Result Date: 10/15/2022  CTA HEAD WITH CONTRAST. CTA NECK WITH CONTRAST.  CT PERFUSION BRAIN. DATE: 10/15/2022 6:07 PM  INDICATION:  Code stroke, left sided weakness, expressive aphasia.  COMPARISON: CT head from earlier today.  TECHNIQUE:   Thin section axial images through the head and neck after IV contrast. CT perfusion images through the brain. Two dimensional and three dimensional reformations. NASCET criteria used to determine stenosis.115 mL Isovue 370 given IV. Low-dose  CT acquisition technique included one of the following options: 1. Automated exposure control, 2. Adjustment of mA and/or KV according to patient's size and/or 3. Use of iterative reconstruction. FINDINGS:   CTA NECK: Aortic arch: Normal triple vessel anatomy. Atherosclerosis. Right common carotid artery: Normal origin. Calcified and noncalcified plaque distally. No significant stenosis, occlusion. Right internal carotid artery: Calcified plaque proximally less than 50% stenosis. Calcifications distally. No significant stenosis, occlusion. Right vertebral artery: Normal origin. Calcifications distally. No significant stenosis, occlusion. Left common carotid artery: Normal origin. Scattered calcified and noncalcified plaque. No significant stenosis, occlusion. Left internal carotid artery: Calcified and noncalcified plaque proximally. Calcifications distally. No significant stenosis, occlusion. Left vertebral artery: Normal origin. Calcifications distally. No significant stenosis, occlusion. Parotid and submandibular glands are intact. Posterior nasopharynx, oropharynx, hypopharynx, larynx, thyroid gland, proximal trachea, imaged superior mediastinum  are patent. Lymph nodes in the neck do not meet the CT size criteria for lymphadenopathy. Degenerative changes of the spine. Centrilobular and paraseptal emphysema in the lung apices. CTA HEAD: No large aneurysm, significant stenosis or occlusion of the proximal major cerebral arteries. Major dural venous sinuses are patent. CT PERFUSION BRAIN: Cerebral blood flow,  blood volume and mean transit time maps are symmetric without perfusion defect. CBF<30% vol: 0 mL Tmax>6s vol: 0 mL     Impression: 1. Atherosclerosis without significant stenosis or occlusion of the major arteries of the head and neck. Basilar artery is patent. Likely artifact on earlier noncontrast head CT. 2. No perfusion defect.. Report called to stroke navigator lupe armando at 10/15/2022 8:52 PM Electronically signed by:  Palomo Doty M.D.  10/15/2022 6:54 PM Mountain Time    CT CEREBRAL PERFUSION WITH & WITHOUT CONTRAST    Result Date: 10/15/2022  CTA HEAD WITH CONTRAST. CTA NECK WITH CONTRAST. CT PERFUSION BRAIN. DATE: 10/15/2022 6:07 PM  INDICATION:  Code stroke, left sided weakness, expressive aphasia.  COMPARISON: CT head from earlier today.  TECHNIQUE:   Thin section axial images through the head and neck after IV contrast. CT perfusion images through the brain. Two dimensional and three dimensional reformations. NASCET criteria used to determine stenosis.115 mL Isovue 370 given IV. Low-dose  CT acquisition technique included one of the following options: 1. Automated exposure control, 2. Adjustment of mA and/or KV according to patient's size and/or 3. Use of iterative reconstruction. FINDINGS:   CTA NECK: Aortic arch: Normal triple vessel anatomy. Atherosclerosis. Right common carotid artery: Normal origin. Calcified and noncalcified plaque distally. No significant stenosis, occlusion. Right internal carotid artery: Calcified plaque proximally less than 50% stenosis. Calcifications distally. No significant stenosis, occlusion. Right vertebral artery: Normal origin. Calcifications distally. No significant stenosis, occlusion. Left common carotid artery: Normal origin. Scattered calcified and noncalcified plaque. No significant stenosis, occlusion. Left internal carotid artery: Calcified and noncalcified plaque proximally. Calcifications distally. No significant stenosis, occlusion. Left  vertebral artery: Normal origin. Calcifications distally. No significant stenosis, occlusion. Parotid and submandibular glands are intact. Posterior nasopharynx, oropharynx, hypopharynx, larynx, thyroid gland, proximal trachea, imaged superior mediastinum  are patent. Lymph nodes in the neck do not meet the CT size criteria for lymphadenopathy. Degenerative changes of the spine. Centrilobular and paraseptal emphysema in the lung apices. CTA HEAD: No large aneurysm, significant stenosis or occlusion of the proximal major cerebral arteries. Major dural venous sinuses are patent. CT PERFUSION BRAIN: Cerebral blood flow, blood volume and mean transit time maps are symmetric without perfusion defect. CBF<30% vol: 0 mL Tmax>6s vol: 0 mL     Impression: 1. Atherosclerosis without significant stenosis or occlusion of the major arteries of the head and neck. Basilar artery is patent. Likely artifact on earlier noncontrast head CT. 2. No perfusion defect.. Report called to stroke navigator lupe armando at 10/15/2022 8:52 PM Electronically signed by:  Palomo Doty M.D.  10/15/2022 6:54 PM Mountain Time          I have reviewed the medications:  Scheduled Meds:Pharmacy Consult, , Does not apply, Daily  aspirin, 81 mg, Oral, Daily   Or  aspirin, 300 mg, Rectal, Daily  atorvastatin, 40 mg, Oral, Nightly  busPIRone, 5 mg, Oral, BID  cefTRIAXone, 1 g, Intravenous, Q24H  clopidogrel, 75 mg, Oral, Daily  escitalopram, 20 mg, Oral, Daily  gabapentin, 400 mg, Oral, TID  levETIRAcetam, 750 mg, Intravenous, Q12H  nicotine, 1 patch, Transdermal, Q24H  pantoprazole, 40 mg, Oral, Q AM  sodium chloride, 10 mL, Intravenous, Q12H  sodium chloride, 10 mL, Intravenous, Q12H      Continuous Infusions:sodium chloride, 75 mL/hr, Last Rate: 75 mL/hr (10/16/22 1039)      PRN Meds:.•  acetaminophen **OR** acetaminophen **OR** acetaminophen  •  magnesium sulfate **OR** magnesium sulfate **OR** magnesium sulfate  •  sodium chloride  •  sodium  chloride  •  sodium chloride    Assessment & Plan   Assessment & Plan     Active Hospital Problems    Diagnosis  POA   • **AMS (altered mental status) [R41.82]  Unknown   • TIA (transient ischemic attack) [G45.9]  Yes   • Heart disease [I51.9]  Unknown   • Chronic kidney disease [N18.9]  Unknown   • Heart failure (HCC) [I50.9]  Unknown   • Hypertension [I10]  Unknown   • Osteoporosis [M81.0]  Unknown   • Seizures (HCC) [R56.9]  Unknown   • Hypomagnesemia [E83.42]  Unknown      Resolved Hospital Problems   No resolved problems to display.        Brief Hospital Course to date:  Gabriela Waller is a 61 y.o. female with a PMH significant for history of stroke with no residual deficits, CHF, CAD, seizures, HTN, CKD who comes to the ED due to difficulty speaking.  Patient admitted for further workup.    This patient's problems and plans were partially entered by my partner and updated as appropriate by me 10/16/22.    All problems are new to me today.     TIA  --stroke yury, neurology consulted  --serial NIH/neurochecks  --aspirin 324 given  --continue aspirin 81mg oral/ 300 rectal daily  --high intensity statin  --lipid panel with elevated cholesterol, LDL and triglycerides, a1c: 5.7, tsh stable  --echo with bubble  - EEG pending  --CT head, CTA head and neck, CT perfusion within normal limits  --MRI brain negative  --PT/OT/SLP  --Telemetry  --Permissive hypertension pending MRI results     Altered mental status  - Low-grade temperature, left shift on CBC.   -UA not consistent with UTI (nitrite neg, leuk est neg, no WBCs) but has 2+ bacteria.  It was reported to stroke team, pt had recent UTI and abx.  She denied abx but did c/o dysuria.  Will add urine cx and add rocephin for now.  - Chest x-ray without acute finding  - Lactic acid stable, procalcitonin stable, blood cultures pending  - Consider chest CT  - Low threshold to add on antibiotics  - Respiratory panel pending     Seizures  - Seizure precautions  - Home  Keppra dose increased by neurology     Hypomagnesemia  - Replace per protocol  - A.m. lab     CHF  - TTE for a.m.  - Monitor daily weights, strict intake and output     HTN  - restart home antihypertensives    Expected Discharge Location and Transportation: home vs placement  Expected Discharge Date: 10/19/22    DVT prophylaxis:  Mechanical DVT prophylaxis orders are present.     AM-PAC 6 Clicks Score (PT): 14 (10/16/22 0903)    CODE STATUS:   Code Status and Medical Interventions:   Ordered at: 10/15/22 2229     Code Status (Patient has no pulse and is not breathing):    CPR (Attempt to Resuscitate)     Medical Interventions (Patient has pulse or is breathing):    Full Support       Jesika Fields DO  10/16/22

## 2022-10-16 NOTE — PLAN OF CARE
"Goal Outcome Evaluation:           Progress: improving  Outcome Evaluation: Patient A&Ox 4, calm and cooperative with care.  Patient drowsy at times through the shift, neuro checks as noted.  Yassine score = 19, patient turns self independently.  Falls score = 16 with bed alarm and pad alarm intact.  NSR - SB at times on telemetry.  Complaints of pain to \"tailbone\" relieved with Tylenol.  No new complaints voiced.  "

## 2022-10-16 NOTE — PLAN OF CARE
Problem: Adult Inpatient Plan of Care  Goal: Plan of Care Review  Outcome: Ongoing, Progressing  Flowsheets (Taken 10/16/2022 1500)  Progress: improving  Plan of Care Reviewed With: patient   Goal Outcome Evaluation:  Plan of Care Reviewed With: patient        Progress: improving   SLP evaluation completed. Will address communication deficits. Please see note for further details and recommendations.

## 2022-10-16 NOTE — THERAPY EVALUATION
Patient Name: Gabriela Waller  : 1960    MRN: [id number]                              Today's Date: 10/16/2022       Admit Date: 10/15/2022    Visit Dx:     ICD-10-CM ICD-9-CM   1. Altered mental status, unspecified altered mental status type  R41.82 780.97   2. History of seizure  Z87.898 V13.89   3. TIA (transient ischemic attack)  G45.9 435.9     Patient Active Problem List   Diagnosis   • Degenerative disc disease, lumbar   • Degenerative disc disease, cervical   • Cervical disc herniation   • Chronic midline low back pain with left-sided sciatica   • TIA (transient ischemic attack)   • Heart disease   • Chronic kidney disease   • Heart failure (HCC)   • Hypertension   • Osteoporosis   • Seizures (HCC)   • AMS (altered mental status)   • Hypomagnesemia     Past Medical History:   Diagnosis Date   • Arthritis    • Chronic kidney disease    • Headache    • Heart disease    • Heart failure (HCC)    • Hypertension    • Low back pain    • Osteoporosis    • Recent urinary tract infection    • Seizures (HCC)      Past Surgical History:   Procedure Laterality Date   •  SECTION     • COLONOSCOPY        General Information     Row Name 10/16/22 1147          OT Time and Intention    Document Type evaluation  -LC     Mode of Treatment occupational therapy  -LC     Row Name 10/16/22 1147          General Information    Patient Profile Reviewed yes  -LC     Prior Level of Function independent:;all household mobility;transfer;ADL's  -LC     Existing Precautions/Restrictions fall;seizures  -LC     Barriers to Rehab medically complex  -LC     Row Name 10/16/22 1147          Living Environment    People in Home child(sangita), adult  -LC     Row Name 10/16/22 1147          Home Main Entrance    Number of Stairs, Main Entrance two  -LC     Row Name 10/16/22 1147          Stairs Within Home, Primary    Stairs, Within Home, Primary Lives in basement, kitchen on main floor and full bathroom on 2nd story. Only half  bath in basement level.   -     Row Name 10/16/22 1147          Cognition    Orientation Status (Cognition) oriented to;person;place;time  -     Row Name 10/16/22 1147          Safety Issues, Functional Mobility    Safety Issues Affecting Function (Mobility) awareness of need for assistance;insight into deficits/self-awareness;safety precaution awareness;safety precautions follow-through/compliance;sequencing abilities  -     Impairments Affecting Function (Mobility) balance;endurance/activity tolerance;pain;strength  -           User Key  (r) = Recorded By, (t) = Taken By, (c) = Cosigned By    Initials Name Provider Type     Rowan Johnson OT Occupational Therapist                 Mobility/ADL's     Row Name 10/16/22 1149          Bed Mobility    Bed Mobility scooting/bridging;supine-sit  -     Scooting/Bridging Slemp (Bed Mobility) minimum assist (75% patient effort);verbal cues  -     Supine-Sit Slemp (Bed Mobility) minimum assist (75% patient effort);verbal cues  -     Assistive Device (Bed Mobility) bed rails;head of bed elevated  -     Comment, (Bed Mobility) VC's to sequence transitioning from supine to sit EOB.  -     Row Name 10/16/22 1149          Transfers    Transfers sit-stand transfer;toilet transfer  -     Comment, (Transfers) VC's for hand placement and sequencing.  -     Row Name 10/16/22 1149          Sit-Stand Transfer    Sit-Stand Slemp (Transfers) minimum assist (75% patient effort);2 person assist;verbal cues  -     Assistive Device (Sit-Stand Transfers) other (see comments)  BUE support  -     Row Name 10/16/22 1149          Toilet Transfer    Type (Toilet Transfer) sit-stand;stand-sit  -     Slemp Level (Toilet Transfer) minimum assist (75% patient effort);2 person assist;verbal cues  -     Assistive Device (Toilet Transfer) other (see comments)  BUE support  -     Row Name 10/16/22 1149          Activities of Daily Living    BADL  Assessment/Intervention toileting;lower body dressing  -     Row Name 10/16/22 1149          Toileting Assessment/Training    Orange Level (Toileting) adjust/manage clothing;perform perineal hygiene;dependent (less than 25% patient effort)  -     Assistive Devices (Toileting) commode, bedside without drop arms  -     Position (Toileting) supported sitting;supported standing  -     Comment, (Toileting) Increased time needed for task.  -     Row Name 10/16/22 1149          Lower Body Dressing Assessment/Training    Orange Level (Lower Body Dressing) don;socks;dependent (less than 25% patient effort)  -     Position (Lower Body Dressing) edge of bed sitting  -           User Key  (r) = Recorded By, (t) = Taken By, (c) = Cosigned By    Initials Name Provider Type    Rowan Alejandro OT Occupational Therapist               Obj/Interventions     Row Name 10/16/22 1150          Sensory Assessment (Somatosensory)    Sensory Assessment (Somatosensory) UE sensation intact  -[facility] Name 10/16/22 1150          Vision Assessment/Intervention    Visual Impairment/Limitations WFL  -[facility] Name 10/16/22 1150          Range of Motion Comprehensive    General Range of Motion bilateral upper extremity ROM WFL  -[facility] Name 10/16/22 1150          Strength Comprehensive (MMT)    General Manual Muscle Testing (MMT) Assessment upper extremity strength deficits identified  -     Comment, General Manual Muscle Testing (MMT) Assessment BUE grossly 4/5  -     Row Name 10/16/22 1150          Motor Skills    Motor Skills functional endurance  -     Functional Endurance impaired activity tolerance  -     Row Name 10/16/22 1150          Balance    Balance Assessment sitting static balance;sitting dynamic balance;standing static balance;standing dynamic balance  -     Static Sitting Balance standby assist  -     Dynamic Sitting Balance contact guard  -     Position, Sitting Balance  supported;unsupported;sitting edge of bed  -     Static Standing Balance minimal assist;2-person assist  -LC     Dynamic Standing Balance minimal assist;2-person assist  -LC     Position/Device Used, Standing Balance supported;other (see comments)  BUE support  -     Balance Interventions sitting;standing;sit to stand;occupation based/functional task;weight shifting activity  -     Comment, Balance Min A x 2 to steady and sequence  -           User Key  (r) = Recorded By, (t) = Taken By, (c) = Cosigned By    Initials Name Provider Type     Rowan Johnson OT Occupational Therapist               Goals/Plan     Row Name 10/16/22 1205          Transfer Goal 1 (OT)    Activity/Assistive Device (Transfer Goal 1, OT) sit-to-stand/stand-to-sit;bed-to-chair/chair-to-bed  -     Sweetser Level/Cues Needed (Transfer Goal 1, OT) contact guard required;verbal cues required  -     Time Frame (Transfer Goal 1, OT) long term goal (LTG);by discharge  -     Progress/Outcome (Transfer Goal 1, OT) goal ongoing  -     Row Name 10/16/22 1201          Dressing Goal 1 (OT)    Activity/Device (Dressing Goal 1, OT) lower body dressing  -     Sweetser/Cues Needed (Dressing Goal 1, OT) moderate assist (50-74% patient effort);verbal cues required  -     Time Frame (Dressing Goal 1, OT) long term goal (LTG);by discharge  -     Progress/Outcome (Dressing Goal 1, OT) goal ongoing  -     Row Name 10/16/22 1201          Toileting Goal 1 (OT)    Activity/Device (Toileting Goal 1, OT) adjust/manage clothing;perform perineal hygiene;commode;grab bar/safety frame  -     Sweetser Level/Cues Needed (Toileting Goal 1, OT) moderate assist (50-74% patient effort);verbal cues required  -     Progress/Outcome (Toileting Goal 1, OT) goal ongoing  -           User Key  (r) = Recorded By, (t) = Taken By, (c) = Cosigned By    Initials Name Provider Type    Rowan Alejandro OT Occupational Therapist                "Clinical Impression     Row Name 10/16/22 1152          Pain Assessment    Pretreatment Pain Rating 6/10  -LC     Posttreatment Pain Rating 4/10  -LC     Pain Location generalized  -     Pain Location - buttock  -     Pre/Posttreatment Pain Comment Pt. reports \"tail bone\" pain  -     Pain Intervention(s) Repositioned;Ambulation/increased activity  -     Additional Documentation Pain Scale: Word Pre/Post-Treatment (Group)  -     Row Name 10/16/22 1152          Plan of Care Review    Plan of Care Reviewed With patient  -     Progress no change  -     Outcome Evaluation Pt. presents with impaired activity tolerance, balance, generalized weakness and a decline in ADL performance. Pt. demonstrates bed mobility with Min A and T/Fs with Min A x 2 and BUE support. Pt. DEP for toileting and LBD. Skilled OT services warranted to promote return to PLOF. Recommend IPR at dichar[facility].  -     Row Name 10/16/22 1152          Therapy Assessment/Plan (OT)    Patient/Family Therapy Goal Statement (OT) To return to PLOF  -     Therapy Frequency (OT) daily  -     Row Name 10/16/22 1152          Therapy Plan Review/Discharge Plan (OT)    Anticipated Discharge Disposition (OT) inpatient rehabilitation facility  -     Row Name 10/16/22 1152          Vital Signs    Pre Systolic BP Rehab 174  -LC     Pre Treatment Diastolic   -LC     Post Systolic BP Rehab 157  -LC     Post Treatment Diastolic BP 88  -LC     Pre Patient Position Supine  -LC     Intra Patient Position Standing  -LC     Post Patient Position Sitting  -     Row Name 10/16/22 1152          Positioning and Restraints    Pre-Treatment Position in bed  -     Post Treatment Position chair  -LC     In Chair notified nsg;reclined;call light within reach;encouraged to call for assist;exit alarm on;waffle cushion;legs elevated  -           User Key  (r) = Recorded By, (t) = Taken By, (c) = Cosigned By    Initials Name Provider Type     Rowan Johnson, " OT Occupational Therapist               Outcome Measures     Row Name 10/16/22 1209          How much help from another is currently needed...    Putting on and taking off regular lower body clothing? 1  -LC     Bathing (including washing, rinsing, and drying) 1  -LC     Toileting (which includes using toilet bed pan or urinal) 1  -LC     Putting on and taking off regular upper body clothing 3  -LC     Taking care of personal grooming (such as brushing teeth) 3  -LC     Eating meals 3  -     AM-PAC 6 Clicks Score (OT) 12  -     Row Name 10/16/22 0903 10/16/22 0800       How much help from another person do you currently need...    Turning from your back to your side while in flat bed without using bedrails? 4  - 4  -TG    Moving from lying on back to sitting on the side of a flat bed without bedrails? 3  - 3  -TG    Moving to and from a bed to a chair (including a wheelchair)? 2  - 2  -TG    Standing up from a chair using your arms (e.g., wheelchair, bedside chair)? 2  - 2  -TG    Climbing 3-5 steps with a railing? 1  - 1  -TG    To walk in hospital room? 2  - 2  -TG    AM-PAC 6 Clicks Score (PT) 14  - 14  -TG    Highest level of mobility 4 --> Transferred to chair/commode  - 4 --> Transferred to chair/commode  -    Row Name 10/16/22 0903          Modified Haydee Scale    Pre-Stroke Modified [place] Scale 6 - Unable to determine (UTD) from the medical record documentation  -     Modified Haydee Scale 4 - Moderately severe disability.  Unable to walk without assistance, and unable to attend to own bodily needs without assistance.  -     Row Name 10/16/22 1209 10/16/22 0903       Functional Assessment    Outcome Measure Options AM-PAC 6 Clicks Daily Activity (OT)  - AM-PAC 6 Clicks Basic Mobility (PT);Modified Baraga  -          User Key  (r) = Recorded By, (t) = Taken By, (c) = Cosigned By    Initials Name Provider Type    TG Arpita Treviño RN Registered Nurse    Rowan Alejandro, OT  Occupational Therapist    Corbin Rodríguez, PT Physical Therapist                Occupational Therapy Education     Title: PT OT SLP Therapies (In Progress)     Topic: Occupational Therapy (In Progress)     Point: ADL training (In Progress)     Description:   Instruct learner(s) on proper safety adaptation and remediation techniques during self care or transfers.   Instruct in proper use of assistive devices.              Learning Progress Summary           Patient Acceptance, E, NR by  at 10/16/2022 1114                   Point: Home exercise program (Not Started)     Description:   Instruct learner(s) on appropriate technique for monitoring, assisting and/or progressing therapeutic exercises/activities.              Learner Progress:  Not documented in this visit.          Point: Precautions (In Progress)     Description:   Instruct learner(s) on prescribed precautions during self-care and functional transfers.              Learning Progress Summary           Patient Acceptance, E, NR by  at 10/16/2022 1114                   Point: Body mechanics (In Progress)     Description:   Instruct learner(s) on proper positioning and spine alignment during self-care, functional mobility activities and/or exercises.              Learning Progress Summary           Patient Acceptance, E, NR by  at 10/16/2022 1114                               User Key     Initials Effective Dates Name Provider Type Discipline     06/16/21 -  Rowan Johnson OT Occupational Therapist OT              OT Recommendation and Plan  Therapy Frequency (OT): daily  Plan of Care Review  Plan of Care Reviewed With: patient  Progress: no change  Outcome Evaluation: Pt. presents with impaired activity tolerance, balance, generalized weakness and a decline in ADL performance. Pt. demonstrates bed mobility with Min A and T/Fs with Min A x 2 and BUE support. Pt. DEP for toileting and LBD. Skilled OT services warranted to promote return to PLOF.  Recommend IPR at dich[facility].     Time Calculation:    Time Calculation- OT     Row Name 10/16/22 1210             Time Calculation- OT    OT Start Time 1114  -LC      OT Received On 10/16/22  -LC      OT Goal Re-Cert Due Date 10/26/22  -         Timed Charges    37403 - OT Self Care/Mgmt Minutes 18  -LC         Untimed Charges    OT Eval/Re-eval Minutes 40  -LC         Total Minutes    Timed Charges Total Minutes 18  -LC      Untimed Charges Total Minutes 40  -LC       Total Minutes 58  -LC            User Key  (r) = Recorded By, (t) = Taken By, (c) = Cosigned By    Initials Name Provider Type     Rowan Johnson OT Occupational Therapist              Therapy Charges for Today     Code Description Service Date Service Provider Modifiers Qty    20830327848 HC OT SELF CARE/MGMT/TRAIN EA 15 MIN 10/16/2022 Rowan Johnson OT GO 1    [id number] HC OT EVAL LOW COMPLEXITY 3 10/16/2022 Rowan Johnson OT GO 1               Rowan Johnson OT  10/16/2022

## 2022-10-16 NOTE — CONSULTS
"Stroke Consult Note    Patient Name: Gabriela Waller   MRN: [id number]  Age: 61 y.o.  Sex: female  : 1960    Primary Care Physician: Margarita Lynch MD  Referring Physician:  Moises Rainey MD    TIME STROKE TEAM CALLED:  EST     TIME PATIENT SEEN:  (on arrival) EST    Handedness: Right  Race:     Chief Complaint/Reason for Consultation: Expressive aphasia and LUE ataxia    HPI: Gabriela Waller is a 61 year old female with known medical history of arthritis, chronic kidney disease, headache, seizures, hypertension, heart failure, heart disease, CVA with no residual deficits, and UTI who presents to [facility] via EMS for evaluation of expressive aphasia and left upper extremity discoordination after an episode of seizure-like activity and decreased responsiveness.  Last known well was approximately 1800, at 1900 the patient was on the phone with her  and suddenly stopped speaking, family went downstairs to check on her and noted that she was unresponsive with her left arm \" tensed up\" while lying in bed.  In route EMS appreciated expressive aphasia and left upper extremity ataxia.  On arrival speech is rapidly improving and patient is no longer demonstrating left upper extremity ataxia, she is able to demonstrate finger-to-nose and rapid alternating movements. /117    NIHSS initially a 1 for mild aphasia.  Discussed case with both Dr. Rainey and with patient's daughter who both agree that the events preceding her neurologic deficits sound like a seizure and that in light of her rapidly improving symptoms the risk of TNKase outweighs the benefit. She was given Ativan 2 mg and a Keppra load of 1g. On subsequent exam she was noted to be drowsy but oriented and did not demonstrate any focal deficit. Speech is clear.     In further discussion with her daughter she tells me that she may have missed her Keppra dose today as she normally keeps a log regarding her medications " and did not write down taking her medications today. She tells me that she is confused intermittently and that the medication log helps ensure that she takes her medications as prescribed. Her last seizure was approximately a month ago.     CT negative for acute intracranial hemorrhage, questionable slight hyperdensity to the distal basilar artery, artifact versus thrombus, will examine CTA head.  Old infarcts in the left cerebellum left parietal lobe and left occipital lobe.    CTA head and neck negative for LVO. Noted mild atherosclerosis without significant stenosis or occlusion. Basilar artery is patent. Per radiology the initial questionable slight hyperdensity of the distal basilar artery is likely artifact after reviewing CTA.     CTP negative for acute infarct or ischemic penumbra.    Last Known Normal Date/Time: 1800 EST     Review of Systems   Past Medical History:   Diagnosis Date   • Arthritis    • Chronic kidney disease    • Headache    • Heart disease    • Heart failure (HCC)    • Hypertension    • Low back pain    • Osteoporosis    • Recent urinary tract infection    • Seizures (HCC)      Past Surgical History:   Procedure Laterality Date   •  SECTION     • COLONOSCOPY       Family History   Problem Relation Age of Onset   • Arthritis Mother    • Heart disease Mother    • Hypertension Mother    • Diabetes Mother    • Asthma Father    • Heart disease Father    • Hypertension Father    • Kidney disease Father    • Kidney disease Sister      Social History     Socioeconomic History   • Marital status:    Tobacco Use   • Smoking status: Every Day     Packs/day: 1.50     Types: Cigarettes   • Smokeless tobacco: Never   Vaping Use   • Vaping Use: Never used   Substance and Sexual Activity   • Alcohol use: Yes     Comment: Occ wine   • Drug use: Never   • Sexual activity: Defer     No Known Allergies  Prior to Admission medications    Medication Sig Start Date End Date Taking? Authorizing  Provider   amLODIPine (NORVASC) 10 MG tablet Take 10 mg by mouth Daily. 8/20/21   Delfin Candelaria MD   aspirin 325 MG tablet Take 1 tablet by mouth.    Delfin Candelaria MD   atorvastatin (LIPITOR) 40 MG tablet Take 40 mg by mouth Every Night. 1/10/22   Delfin Candelaria MD   busPIRone (BUSPAR) 5 MG tablet Take 5 mg by mouth 2 (Two) Times a Day. 8/20/21   Delfin Candelaria MD   clopidogrel (PLAVIX) 75 MG tablet Take 75 mg by mouth Daily. 8/20/21   Delfin Candelaria MD   escitalopram (LEXAPRO) 20 MG tablet Take 20 mg by mouth Daily. 8/20/21   Delfin Candelaria MD   ezetimibe (ZETIA) 10 MG tablet Take 1 tablet by mouth Daily.    Delfin Candelaria MD   gabapentin (NEURONTIN) 400 MG capsule  8/20/21   Delfin Candelaria MD   hydrALAZINE (APRESOLINE) 25 MG tablet Take 25 mg by mouth Daily As Needed. 8/20/21   Delfin Candelaria MD   HYDROcodone-acetaminophen (NORCO) 7.5-325 MG per tablet  8/27/21   Delfin Candelaria MD   levETIRAcetam (KEPPRA) 500 MG tablet Take 500 mg by mouth 2 (Two) Times a Day. 8/20/21   Delfin Candelaria MD   losartan (COZAAR) 100 MG tablet Take 100 mg by mouth Daily. 8/20/21   Delfin Candelaria MD   meclizine (ANTIVERT) 25 MG tablet Take  by mouth See Admin Instructions. Take 1 tablet by mouth every 6-8 hours as needed 8/20/21   Delfin Candelaria MD   meloxicam (MOBIC) 15 MG tablet Take 15 mg by mouth Daily.    Delfin Candelaria MD   metoprolol succinate XL (TOPROL-XL) 100 MG 24 hr tablet Take 1.5 tablets by mouth.    Delfin Candelaria MD   omeprazole (priLOSEC) 20 MG capsule Take 20 mg by mouth Daily. 8/20/21   Delfin Candelaria MD   potassium chloride 10 MEQ CR tablet Take 10 mEq by mouth Daily. 8/20/21   Delfin Candelaria MD   promethazine (PHENERGAN) 12.5 MG tablet TAKE 1 TABLET BY MOUTH EVERY DAY AS NEEDED FORNAUSEA AND VOMITING 8/20/21   Delfin Candelaria MD   spironolactone (ALDACTONE) 25 MG tablet Take 25 mg by mouth  Daily. 8/20/21   Delfin Candelaria MD   tiZANidine (ZANAFLEX) 4 MG tablet  8/20/21   Delfin Candelaria MD   traZODone (DESYREL) 50 MG tablet Take 50 mg by mouth Every Night.    Delfin Candelaria MD   Vascepa 1 g capsule capsule Take 2 capsules by mouth 2 (Two) Times a Day. 12/12/21   Delfin Candelaria MD   vitamin B-12 (CYANOCOBALAMIN) 1000 MCG tablet Take 1,000 mcg by mouth Daily. 1/10/22   Delfin Candelaria MD         Temp:  [99 °F (37.2 °C)] 99 °F (37.2 °C)  Heart Rate:  [102-114] 114  Resp:  [14-20] 14  BP: (149-176)/() 149/117     Neurological Exam  Mental Status  Awake and alert. Oriented only to person and time. Speech is normal. Language: Mild expressive aphasia, rapidly improving from initial presentation.    Cranial Nerves  CN II: Visual fields full to confrontation.  CN III, IV, VI: Extraocular movements intact bilaterally. Normal lids and orbits bilaterally. Pupils equal round and reactive to light bilaterally.  CN V: Facial sensation is normal.  CN VII: Full and symmetric facial movement.  CN VIII: Hearing appears intact.  CN IX, X: Palate elevates symmetrically  CN XI: Shoulder shrug strength is normal.  CN XII: Tongue midline without atrophy or fasciculations.    Motor  Normal muscle bulk throughout. No fasciculations present. Normal muscle tone. No abnormal involuntary movements.  RUE 4/5  LUE 4/5  RLE 4/5  LLE 4/5.    Sensory  Light touch is normal in upper and lower extremities.     Reflexes                                            Right                      Left  Plantar                           Downgoing                Downgoing    Coordination  Right: Finger-to-nose normal. Rapid alternating movement normal. Heel-to-shin normal.Left: Finger-to-nose normal. Rapid alternating movement normal. Heel-to-shin normal.    Gait    Not observed.      Physical Exam  Constitutional:       General: She is awake.      Appearance: She is ill-appearing.   HENT:      Head:  Normocephalic and atraumatic.      Mouth/Throat:      Mouth: Mucous membranes are moist.      Pharynx: Oropharynx is clear.   Eyes:      General: Lids are normal.      Extraocular Movements: Extraocular movements intact.      Pupils: Pupils are equal, round, and reactive to light.   Cardiovascular:      Rate and Rhythm: Normal rate and regular rhythm.      Comments: NSR per 12 lead EKG  Pulmonary:      Effort: Pulmonary effort is normal. No respiratory distress.      Comments: Room air  Musculoskeletal:      Right lower leg: No edema.      Left lower leg: No edema.   Skin:     General: Skin is warm and dry.   Neurological:      Mental Status: She is alert. She is disoriented.      Comments: Mild expressive aphasia, rapidly improved   Psychiatric:         Speech: Speech normal.         Acute Stroke Data    Thrombolytic Inclusion / Exclusion Criteria    Time: 20:17 EDT  Person Administering Scale: MILLER Stephenson    Inclusion Criteria  [x]   18 years of age or greater   [x]   Onset of symptoms < 4.5 hours before beginning treatment (stroke onset = time patient was last seen well or without symptoms).   []   Diagnosis of acute ischemic stroke causing measurable disabling deficit (Complete Hemianopia, Any Aphasia, Visual or Sensory Extinction, Any weakness limiting sustained effort against gravity)   []   Any remaining deficit considered potentially disabling in view of patient and practitioner   Exclusion criteria (Do not proceed with Alteplase if any are checked under exclusion criteria)  []   Onset unknown or GREATER than 4.5 hours   []   ICH on CT/MRI   []   CT demonstrates hypodensity representing acute or subacute infarct   []   Significant head trauma or prior stroke in the previous 3 months   []   Symptoms suggestive of subarachnoid hemorrhage   []   History of un-ruptured intracranial aneurysm GREATER than 10 mm   []   Recent intracranial or intraspinal surgery within the last 3 months   []   Arterial  puncture at a non-compressible site in the previous 7 days   []   Active internal bleeding   []   Acute bleeding tendency   []   Platelet count LESS than 100,000 for known hematological diseases such as leukemia, thrombocytopenia or chronic cirrhosis   []   Current use of anticoagulant with INR GREATER than 1.7 or PT GREATER than 15 seconds, aPTT GREATER than 40 seconds   []   Heparin received within 48 hours, resulting in abnormally elevated aPTT GREATER than upper limit of normal   []   Current use of direct thrombin inhibitors or direct factor Xa inhibitors in the past 48 hours   []   Elevated blood pressure refractory to treatment (systolic GREATER than 185 mm/Hg or diastolic  GREATER than 110 mm/Hg   []   Suspected infective endocarditis and aortic arch dissection   []   Current use of therapeutic treatment dose of low-molecular-weight heparin (LMWH) within the previous 24 hours   []   Structural GI malignancy or bleed   Relative exclusion for all patients  []   Only minor non-disabling symptoms   []   Pregnancy   [x]   Seizure at onset with postictal residual neurological impairments   []   Major surgery or previous trauma within past 14 days   []   History of previous spontaneous ICH, intracranial neoplasm, or AV malformation   []   Postpartum (within previous 14 days)   []   Recent GI or urinary tract hemorrhage (within previous 21 days)   []   Recent acute MI (within previous 3 months)   []   History of un-ruptured intracranial aneurysm LESS than 10 mm   []   History of ruptured intracranial aneurysm   []   Blood glucose LESS than 50 mg/dL (2.7 mmol/L)   []   Dural puncture within the last 7 days   []   Known GREATER than 10 cerebral microbleeds   Additional exclusions for patients with symptoms onset between 3 and 4.5 hours.  []   Age > 80.   []   On any anticoagulants regardless of INR  >>> Warfarin (Coumadin), Heparin, Enoxaparin (Lovenox), fondaparinux (Arixtra), bivalirudin (Angiomax), Argatroban,  dabigatran (Pradaxa), rivaroxaban (Xarelto), or apixaban (Eliquis)   []   Severe stroke (NIHSS > 25).   []   History of BOTH diabetes and previous ischemic stroke.   []   The risks and benefits have been discussed with the patient or family related to the administration of IV thrombolytic therapy for stroke symptoms.   []   I have discussed and reviewed the patient's case and imaging with the attending prior to IV thrombolytic therapy.   N/A Time IV thrombolytic administered     Discussed with Dr. Rainey and patient's daughter who both agreed with not administering TNK d/t rapidly improving symptoms and onset of symptoms proceeded by seizure like activity. Patient's daughter states that this is what her previous seizure activity looked like.    Upon subsequent exam patient is no longer demonstrating aphasia. She is drowsy since administration of Ativan. She is able to follow commands, no focal weakness, face symmetric, PERRL, EOMI, speech is clear.     Hospital Meds:  Scheduled- iopamidol, 150 mL, Intravenous, Once in imaging  LORazepam, 2 mg, Intravenous, Once      Infusions-     PRNs-     Functional Status Prior to Current Stroke/[place] Score: 1    NIH Stroke Scale  Time: 20:17 EDT  Person Administering Scale: MILLER Stephenson    Interval: baseline  1a. Level of Consciousness: 0-->Alert, keenly responsive  1b. LOC Questions: 0-->Answers both questions correctly  1c. LOC Commands: 0-->Performs both tasks correctly  2. Best Gaze: 0-->Normal  3. Visual: 0-->No visual loss  4. Facial Palsy: 0-->Normal symmetrical movements  5a. Motor Arm, Left: 0-->No drift, limb holds 90 (or 45) degrees for full 10 secs  5b. Motor Arm, Right: 0-->No drift, limb holds 90 (or 45) degrees for full 10 secs  6a. Motor Leg, Left: 0-->No drift, leg holds 30 degree position for full 5 secs  6b. Motor Leg, Right: 0-->No drift, leg holds 30 degree position for full 5 secs  7. Limb Ataxia: 0-->Absent  8. Sensory: 0-->Normal, no sensory  loss  9. Best Language: 1-->Mild-to-moderate aphasia, some obvious loss of fluency or facility of comprehension, without significant limitation on ideas expressed or form of expression. Reduction of speech and/or comprehension, however, makes conversation. . . (see row details)  10. Dysarthria: 0-->Normal  11. Extinction and Inattention (formerly Neglect): 0-->No abnormality    Total (NIH Stroke Scale): 1    Results Reviewed:  I have personally reviewed current lab, radiology, and data and agree with results.    CT Angiogram Neck    Result Date: 10/15/2022  1. Atherosclerosis without significant stenosis or occlusion of the major arteries of the head and neck. Basilar artery is patent. Likely artifact on earlier noncontrast head CT. 2. No perfusion defect.. Report called to stroke navigator lupe armando at 10/15/2022 8:52 PM Electronically signed by:  Palomo Doty M.D.  10/15/2022 6:54 PM Mountain Time    CT Head Without Contrast Stroke Protocol    Result Date: 10/15/2022  1. No acute intracranial hemorrhage. MRI is more sensitive for the detection of acute nonhemorrhagic infarct. 2. Questionable slight hyperdensity to the distal basilar artery. Artifact versus thrombus. Attention on CTA head. 3. Old infarcts in the left cerebellum, left parietal lobe medially and left occipital lobe inferiorly. MRI can best assess age and assess for any underlying acute infarct. Mild changes small vessel ischemic disease of indeterminate age, presumably mostly chronic. Volume loss. Atherosclerosis. 4. Trace mastoid effusions. Paranasal sinus disease. Report called to stroke navigator lupe armando at 10/15/2022 8:12 PM Electronically signed by:  Palomo Doty M.D.  10/15/2022 6:17 PM Mountain Time    CT Angiogram Head w AI Analysis of LVO    Result Date: 10/15/2022  1. Atherosclerosis without significant stenosis or occlusion of the major arteries of the head and neck. Basilar artery is patent. Likely artifact on  earlier noncontrast head CT. 2. No perfusion defect.. Report called to stroke navigator lupe armando at 10/15/2022 8:52 PM Electronically signed by:  Palomo Doty M.D.  10/15/2022 6:54 PM Mountain Time    CT CEREBRAL PERFUSION WITH & WITHOUT CONTRAST    Result Date: 10/15/2022  1. Atherosclerosis without significant stenosis or occlusion of the major arteries of the head and neck. Basilar artery is patent. Likely artifact on earlier noncontrast head CT. 2. No perfusion defect.. Report called to stroke navigator lupe armando at 10/15/2022 8:52 PM Electronically signed by:  Palomo Doty M.D.  10/15/2022 6:54 PM Mountain Time    Glucose 71  Sodium 133  Creatinine 0.80  ALT 6  AST 11  Magnesium 1.7  Pro time 12.7  INR 1.1  WBC 7.56  Hemoglobin 13.2  Hematocrit 38.6  Platelets 444      CT negative for acute intracranial hemorrhage, questionable slight hyperdensity to the distal basilar artery, artifact versus thrombus, will examine CTA head.  Old infarcts in the left cerebellum left parietal lobe and left occipital lobe.    CTA head and neck negative for LVO. Noted mild atherosclerosis without significant stenosis or occlusion. Basilar artery is patent. Per radiology the initial questionable slight hyperdensity of the distal basilar artery is likely artifact after reviewing CTA.     CTP negative for acute infarct or ischemic penumbra      Assessment/Plan:    Gabriela Waller is a 61 year old female with known medical history of arthritis, chronic kidney disease, headache, seizures, hypertension, heart failure, heart disease, CVA with no residual deficits, and UTI who presents to [facility] via EMS for evaluation of an episode of expressive aphasia and left upper extremity discoordination after an episode of seizure-like activity and decreased responsiveness.      Patient is not a candidate for IV TNK secondary to seizure activity preceeding neurologic deficit and rapidly improving  symptoms (discussed with both Dr. Rainey and patient's daughter who agreed that risks outweighed the benefit given patient presentation)    Patient is not a candidate for emergent endovascular intervention secondary to no LVO on CTA/CTP    Antiplatelet PTA: ASA 81 mg, Plavix 75 mg  Anticoagulant PTA: None        1. Seizure activity followed by transient expressive aphasia and LUE dysmetria  1. Differential includes seizure vs TIA/CVA vs infectious process  2. Recommend admission to hospital medicine for further workup  3. Keppra 1g load and Ativan 2 mg administered in ED  4. Discussed case with Dr. Willard who recommended increasing maintenance Keppra dosing to 750 mg BID  5. Seizure precautions  6. EEG in AM  7. UA  8. Will initiate TIA/Ischemic stroke order set, if MRI negative this can be de-escalated  9. Allow for permissive HTN, SBP <220 overnight  10. MRI brain without contrast  11. Continue ASA and Plavix  12. P2Y12  13. Continue home Lipitor 40 mg  14. Lipid panel in AM  15. A1C  16. TTE with bubble study  17. Npo pending bedside dysphagia screen, when cleared recommend cardiac heart healthy diet  18. Activity as tolerated, PT/OT to work with patient      Plan of care discussed with ED physician, Dr. Rainey, on-call neurologist Dr. Willard, patient, and patient's daughter (Enedina Waller via telephone).    Thank you for allowing us to participate in the care of this patient, stroke neurology will continue to follow.       Annalisa Frias, APRN  October 15, 2022  20:17 EDT

## 2022-10-16 NOTE — ED PROVIDER NOTES
"Subjective   History of Present Illness  61-year-old female presents for evaluation of \"possible stroke.\"  Of note, the patient's last known well was at around 6 PM tonight.  She was in the basement of her daughter's home.  At around 7 PM, the patient was speaking with her  on the phone when she had an acute change in her speech and mental status.  He contacted the patient's daughter and advised her to go and check on her in the basement.  When she did so, the patient was completely aphasic with global weakness and seem to be \"tensed up.\"  She did not bite her tongue or have urinary incontinence.  EMS was called.  The patient has a reported history of both TIA and seizure.  She reportedly did not take her seizure medicine earlier today.  She was not hypoglycemic.  She is not anticoagulated.  The patient reportedly improved significantly in route to the ED.  Currently, the patient appears somewhat confused but is able to speak and is unsure as to what occurred earlier this evening.        Review of Systems   Neurological: Positive for speech difficulty and weakness.   Psychiatric/Behavioral: Positive for confusion.   All other systems reviewed and are negative.      Past Medical History:   Diagnosis Date   • Arthritis    • Chronic kidney disease    • Headache    • Heart disease    • Heart failure (HCC)    • Hypertension    • Low back pain    • Osteoporosis    • Recent urinary tract infection    • Seizures (HCC)        No Known Allergies    Past Surgical History:   Procedure Laterality Date   •  SECTION     • COLONOSCOPY         Family History   Problem Relation Age of Onset   • Arthritis Mother    • Heart disease Mother    • Hypertension Mother    • Diabetes Mother    • Asthma Father    • Heart disease Father    • Hypertension Father    • Kidney disease Father    • Kidney disease Sister        Social History     Socioeconomic History   • Marital status:    Tobacco Use   • Smoking status: " Every Day     Packs/day: 1.50     Types: Cigarettes   • Smokeless tobacco: Never   Vaping Use   • Vaping Use: Never used   Substance and Sexual Activity   • Alcohol use: Yes     Comment: Occ wine   • Drug use: Never   • Sexual activity: Defer           Objective   Physical Exam  Vitals and nursing note reviewed.   Constitutional:       Appearance: She is well-developed. She is not diaphoretic.      Comments: Appears mildly confused   HENT:      Head: Normocephalic and atraumatic.      Comments: No tongue laceration present  Eyes:      Pupils: Pupils are equal, round, and reactive to light.   Neck:      Vascular: No carotid bruit.      Comments: No meningeal signs or nuchal rigidity  Cardiovascular:      Rate and Rhythm: Normal rate and regular rhythm.      Heart sounds: Normal heart sounds. No murmur heard.    No friction rub. No gallop.   Pulmonary:      Effort: Pulmonary effort is normal. No respiratory distress.      Breath sounds: Normal breath sounds. No wheezing or rales.   Abdominal:      General: Bowel sounds are normal. There is no distension.      Palpations: Abdomen is soft. There is no mass.      Tenderness: There is no abdominal tenderness. There is no guarding or rebound.   Musculoskeletal:         General: Normal range of motion.      Cervical back: Neck supple.      Right lower leg: No edema.      Left lower leg: No edema.   Skin:     General: Skin is warm and dry.      Findings: No erythema or rash.   Neurological:      Mental Status: She is oriented to person, place, and time.      Comments: Awake, alert, and oriented x3, mild expressive aphasia noted, no ataxia or dysmetria, neurovascularly intact distally in all fours with bounding distal pulses and normal sensation, 5 out of 5 strength in all fours, no cranial nerve deficits noted with cranial nerves II through XII grossly intact   Psychiatric:         Mood and Affect: Mood normal.         Critical Care  Performed by: Moises Rainey,  "MD  Authorized by: Moises Rainey MD     Critical care provider statement:     Critical care time (minutes):  35    Critical care was necessary to treat or prevent imminent or life-threatening deterioration of the following conditions:  CNS failure or compromise    Critical care was time spent personally by me on the following activities:  Development of treatment plan with patient or surrogate, discussions with consultants, evaluation of patient's response to treatment, examination of patient, obtaining history from patient or surrogate, ordering and performing treatments and interventions, ordering and review of laboratory studies, ordering and review of radiographic studies, pulse oximetry, re-evaluation of patient's condition and review of old charts               ED Course  ED Course as of 10/16/22 0005   Sat Oct 15, 2022   2119 61-year-old female presents for evaluation of \"possible stroke.\"  The patient was last known well approximately 1 hour before EMS was called this evening.  The patient was on the phone with a family member when she had altered mental status and aphasia.  The family member speaking with her on the phone contacted her daughter who was in the house with her.  She went down to the basement and found her mother aphasic and globally weak.  EMS was called.  The patient has a reported history of both seizure and TIA.  She is reportedly not on any anticoagulants.  Her mental status improved on the way to our facility.  Glucose normal.  On arrival, the patient is a bit slow to respond to questioning but is speaking appropriately and has no focal weakness.  She did not bite her tongue.  No urinary incontinence.  CT head negative for bleed.  TNK was considered; however, we ultimately decided against that as the patient is rapidly improving, and I feel that her symptoms are much more likely secondary to seizure and stroke at this time.  Further advanced imaging negative for any lesions " amenable to neuro intervention.  Patient loaded with IV Keppra.  Stroke team following and recommending admission to the hospital.  I am in agreement.  I spoke with Dr. Borja, the patient will be admitted under his care for further evaluation and treatment.  The patient is aware/agreeable with the plan at this time. [DD]      ED Course User Index  [DD] Moises Rainey MD                                          Recent Results (from the past 24 hour(s))   POC Protime / INR    Collection Time: 10/15/22  8:07 PM    Specimen: Blood   Result Value Ref Range    Protime 12.7 (L) 12.8 - 15.2 seconds    INR 1.1 0.8 - 1.2   POC CHEM 8    Collection Time: 10/15/22  8:08 PM    Specimen: Blood   Result Value Ref Range    Glucose 77 70 - 130 mg/dL    BUN 12 8 - 26 mg/dL    Creatinine 0.80 0.60 - 1.30 mg/dL    Sodium 137 (L) 138 - 146 mmol/L    POC Potassium 3.9 3.5 - 4.9 mmol/L    Chloride 101 98 - 109 mmol/L    Total CO2 24 24 - 29 mmol/L    Hemoglobin 15.3 12.0 - 17.0 g/dL    Hematocrit 45 38 - 51 %    Ionized Calcium 1.16 (L) 1.20 - 1.32 mmol/L    eGFR 82.9 >60.0 mL/min/1.73   Comprehensive Metabolic Panel    Collection Time: 10/15/22  8:35 PM    Specimen: Blood   Result Value Ref Range    Glucose 71 65 - 99 mg/dL    BUN 10 8 - 23 mg/dL    Creatinine 0.80 0.57 - 1.00 mg/dL    Sodium 133 (L) 136 - 145 mmol/L    Potassium 3.9 3.5 - 5.2 mmol/L    Chloride 97 (L) 98 - 107 mmol/L    CO2 22.0 22.0 - 29.0 mmol/L    Calcium 8.3 (L) 8.6 - 10.5 mg/dL    Total Protein 6.3 6.0 - 8.5 g/dL    Albumin 4.00 3.50 - 5.20 g/dL    ALT (SGPT) 6 1 - 33 U/L    AST (SGOT) 11 1 - 32 U/L    Alkaline Phosphatase 87 39 - 117 U/L    Total Bilirubin 0.4 0.0 - 1.2 mg/dL    Globulin 2.3 gm/dL    A/G Ratio 1.7 g/dL    BUN/Creatinine Ratio 12.5 7.0 - 25.0    Anion Gap 14.0 5.0 - 15.0 mmol/L    eGFR 82.9 >60.0 mL/min/1.73   Troponin    Collection Time: 10/15/22  8:35 PM    Specimen: Blood   Result Value Ref Range    Troponin T <0.010 0.000 - 0.030 ng/mL    Magnesium    Collection Time: 10/15/22  8:35 PM    Specimen: Blood   Result Value Ref Range    Magnesium 1.7 1.6 - 2.4 mg/dL   Green Top (Gel)    Collection Time: 10/15/22  8:35 PM   Result Value Ref Range    Extra Tube Hold for add-ons.    Lavender Top    Collection Time: 10/15/22  8:35 PM   Result Value Ref Range    Extra Tube hold for add-on    Gold Top - SST    Collection Time: 10/15/22  8:35 PM   Result Value Ref Range    Extra Tube Hold for add-ons.    Light Blue Top    Collection Time: 10/15/22  8:35 PM   Result Value Ref Range    Extra Tube Hold for add-ons.    CBC Auto Differential    Collection Time: 10/15/22  8:35 PM    Specimen: Blood   Result Value Ref Range    WBC 7.56 3.40 - 10.80 10*3/mm3    RBC 4.20 3.77 - 5.28 10*6/mm3    Hemoglobin 13.2 12.0 - 15.9 g/dL    Hematocrit 38.6 34.0 - 46.6 %    MCV 91.9 79.0 - 97.0 fL    MCH 31.4 26.6 - 33.0 pg    MCHC 34.2 31.5 - 35.7 g/dL    RDW 13.7 12.3 - 15.4 %    RDW-SD 46.0 37.0 - 54.0 fl    MPV 8.7 6.0 - 12.0 fL    Platelets 444 140 - 450 10*3/mm3    Neutrophil % 80.5 (H) 42.7 - 76.0 %    Lymphocyte % 11.9 (L) 19.6 - 45.3 %    Monocyte % 6.2 5.0 - 12.0 %    Eosinophil % 0.1 (L) 0.3 - 6.2 %    Basophil % 1.2 0.0 - 1.5 %    Immature Grans % 0.1 0.0 - 0.5 %    Neutrophils, Absolute 6.08 1.70 - 7.00 10*3/mm3    Lymphocytes, Absolute 0.90 0.70 - 3.10 10*3/mm3    Monocytes, Absolute 0.47 0.10 - 0.90 10*3/mm3    Eosinophils, Absolute 0.01 0.00 - 0.40 10*3/mm3    Basophils, Absolute 0.09 0.00 - 0.20 10*3/mm3    Immature Grans, Absolute 0.01 0.00 - 0.05 10*3/mm3    nRBC 0.0 0.0 - 0.2 /100 WBC   Procalcitonin    Collection Time: 10/15/22  8:35 PM    Specimen: Blood   Result Value Ref Range    Procalcitonin <0.02 0.00 - 0.25 ng/mL   Urinalysis With Microscopic If Indicated (No Culture) - Urine, Clean Catch    Collection Time: 10/15/22  8:45 PM    Specimen: Urine, Clean Catch   Result Value Ref Range    Color, UA Yellow Yellow, Straw    Appearance, UA Cloudy (A) Clear     pH, UA 6.5 5.0 - 8.0    Specific Gravity, UA 1.027 1.001 - 1.030    Glucose, UA Negative Negative    Ketones, UA 15 mg/dL (1+) (A) Negative    Bilirubin, UA Negative Negative    Blood, UA Negative Negative    Protein, UA >=300 mg/dL (3+) (A) Negative    Leuk Esterase, UA Negative Negative    Nitrite, UA Negative Negative    Urobilinogen, UA 0.2 E.U./dL 0.2 - 1.0 E.U./dL   Urinalysis, Microscopic Only - Urine, Clean Catch    Collection Time: 10/15/22  8:45 PM    Specimen: Urine, Clean Catch   Result Value Ref Range    RBC, UA 0-2 None Seen, 0-2 /HPF    WBC, UA None Seen None Seen, 0-2 /HPF    Bacteria, UA 2+ (A) None Seen, Trace /HPF    Squamous Epithelial Cells, UA 7-12 (A) None Seen, 0-2 /HPF    Hyaline Casts, UA None Seen 0 - 6 /LPF    Methodology Automated Microscopy    POC Glucose Once    Collection Time: 10/15/22 11:51 PM    Specimen: Blood   Result Value Ref Range    Glucose 71 70 - 130 mg/dL     Note: In addition to lab results from this visit, the labs listed above may include labs taken at another facility or during a different encounter within the last 24 hours. Please correlate lab times with ED admission and discharge times for further clarification of the services performed during this visit.    MRI Brain Without Contrast   Final Result      1. No acute intracranial abnormality. Specifically, negative for acute or subacute infarct.   2. Chronic infarcts in the medial left parietal lobe, left occipital, and left inferior cerebellum.   3. Volume loss and mild chronic small vessel ischemic changes.      Electronically signed by:  Manny Liang M.D.     10/15/2022 9:34 PM Mountain Time      XR Chest 1 View   Final Result   No acute cardiopulmonary abnormality.      Electronically signed by:  Leland Patel M.D.     10/15/2022 7:15 PM Mountain Time      CT Angiogram Head w AI Analysis of LVO   Final Result   1. Atherosclerosis without significant stenosis or occlusion of the major arteries of the head and  neck. Basilar artery is patent. Likely artifact on earlier noncontrast head CT.   2. No perfusion defect..      Report called to stroke navigator lupe armando at 10/15/2022 8:52 PM       Electronically signed by:  Palomo Doty M.D.     10/15/2022 6:54 PM Mountain Time      CT CEREBRAL PERFUSION WITH & WITHOUT CONTRAST   Final Result   1. Atherosclerosis without significant stenosis or occlusion of the major arteries of the head and neck. Basilar artery is patent. Likely artifact on earlier noncontrast head CT.   2. No perfusion defect..      Report called to stroke navigator lupe armando at 10/15/2022 8:52 PM       Electronically signed by:  Palomo Doty M.D.     10/15/2022 6:54 PM Mountain Time      CT Angiogram Neck   Final Result   1. Atherosclerosis without significant stenosis or occlusion of the major arteries of the head and neck. Basilar artery is patent. Likely artifact on earlier noncontrast head CT.   2. No perfusion defect..      Report called to stroke navigator lupe armando at 10/15/2022 8:52 PM       Electronically signed by:  Palomo Doty M.D.     10/15/2022 6:54 PM Mountain Time      CT Head Without Contrast Stroke Protocol   Final Result   1. No acute intracranial hemorrhage. MRI is more sensitive for the detection of acute nonhemorrhagic infarct.   2. Questionable slight hyperdensity to the distal basilar artery. Artifact versus thrombus. Attention on CTA head.   3. Old infarcts in the left cerebellum, left parietal lobe medially and left occipital lobe inferiorly. MRI can best assess age and assess for any underlying acute infarct. Mild changes small vessel ischemic disease of indeterminate age, presumably    mostly chronic. Volume loss. Atherosclerosis.   4. Trace mastoid effusions. Paranasal sinus disease.      Report called to stroke navigator lupe armando at 10/15/2022 8:12 PM       Electronically signed by:  Palomo Doty M.D.     10/15/2022 6:17 PM  Mountain Time        Vitals:    10/15/22 2006 10/15/22 2017 10/15/22 2130 10/15/22 2230   BP: (!) 149/117  170/96 (!) 176/103   Pulse: 114  98 101   Resp: 14      Temp:       TempSrc:       SpO2: 98%  96% 95%   Weight:  63.4 kg (139 lb 12.4 oz)     Height:         Medications   sodium chloride 0.9 % flush 10 mL (has no administration in time range)   levETIRAcetam (KEPPRA) 750 mg in sodium chloride 0.9 % 100 mL IVPB (has no administration in time range)   sodium chloride 0.9 % flush 10 mL (has no administration in time range)   sodium chloride 0.9 % flush 10 mL (has no administration in time range)   atorvastatin (LIPITOR) tablet 40 mg (has no administration in time range)   aspirin chewable tablet 81 mg (has no administration in time range)     Or   aspirin suppository 300 mg (has no administration in time range)   clopidogrel (PLAVIX) tablet 75 mg (has no administration in time range)   busPIRone (BUSPAR) tablet 5 mg (has no administration in time range)   escitalopram (LEXAPRO) tablet 20 mg (has no administration in time range)   gabapentin (NEURONTIN) capsule 400 mg (has no administration in time range)   pantoprazole (PROTONIX) EC tablet 40 mg (has no administration in time range)   sodium chloride 0.9 % flush 10 mL (has no administration in time range)   sodium chloride 0.9 % flush 10 mL (has no administration in time range)   acetaminophen (TYLENOL) tablet 650 mg (has no administration in time range)     Or   acetaminophen (TYLENOL) 160 MG/5ML solution 650 mg (has no administration in time range)     Or   acetaminophen (TYLENOL) suppository 650 mg (has no administration in time range)   nicotine (NICODERM CQ) 21 MG/24HR patch 1 patch (has no administration in time range)   Magnesium Sulfate 2 gram Bolus, followed by 8 gram infusion (total Mg dose 10 grams)- Mg less than or equal to 1mg/dL (has no administration in time range)     Or   Magnesium Sulfate 2 gram / 50mL Infusion (GIVE X 3 BAGS TO EQUAL 6GM TOTAL  DOSE) - Mg 1.1 - 1.5 mg/dl (has no administration in time range)     Or   Magnesium Sulfate 4 gram infusion- Mg 1.6-1.9 mg/dL (has no administration in time range)   cefTRIAXone (ROCEPHIN) 1 g/100 mL 0.9% NS (MBP) (has no administration in time range)   sodium chloride 0.9 % bolus 1,000 mL (1,000 mL Intravenous New Bag 10/16/22 0010)   sodium chloride 0.9 % infusion (75 mL/hr Intravenous New Bag 10/16/22 0010)   iopamidol (ISOVUE-370) 76 % injection 150 mL (115 mL Intravenous Given 10/15/22 2018)   LORazepam (ATIVAN) injection 2 mg (2 mg Intravenous Given 10/15/22 2018)   levETIRAcetam in NaCl 0.75% (KEPPRA) IVPB 1,000 mg (0 mg Intravenous Stopped 10/15/22 2118)     ECG/EMG Results (last 24 hours)     ** No results found for the last 24 hours. **        ECG 12 Lead    (Results Pending)   ECG 12 Lead    (Results Pending)     Recent Results (from the past 24 hour(s))   POC Protime / INR    Collection Time: 10/15/22  8:07 PM    Specimen: Blood   Result Value Ref Range    Protime 12.7 (L) 12.8 - 15.2 seconds    INR 1.1 0.8 - 1.2   POC CHEM 8    Collection Time: 10/15/22  8:08 PM    Specimen: Blood   Result Value Ref Range    Glucose 77 70 - 130 mg/dL    BUN 12 8 - 26 mg/dL    Creatinine 0.80 0.60 - 1.30 mg/dL    Sodium 137 (L) 138 - 146 mmol/L    POC Potassium 3.9 3.5 - 4.9 mmol/L    Chloride 101 98 - 109 mmol/L    Total CO2 24 24 - 29 mmol/L    Hemoglobin 15.3 12.0 - 17.0 g/dL    Hematocrit 45 38 - 51 %    Ionized Calcium 1.16 (L) 1.20 - 1.32 mmol/L    eGFR 82.9 >60.0 mL/min/1.73   Comprehensive Metabolic Panel    Collection Time: 10/15/22  8:35 PM    Specimen: Blood   Result Value Ref Range    Glucose 71 65 - 99 mg/dL    BUN 10 8 - 23 mg/dL    Creatinine 0.80 0.57 - 1.00 mg/dL    Sodium 133 (L) 136 - 145 mmol/L    Potassium 3.9 3.5 - 5.2 mmol/L    Chloride 97 (L) 98 - 107 mmol/L    CO2 22.0 22.0 - 29.0 mmol/L    Calcium 8.3 (L) 8.6 - 10.5 mg/dL    Total Protein 6.3 6.0 - 8.5 g/dL    Albumin 4.00 3.50 - 5.20 g/dL     ALT (SGPT) 6 1 - 33 U/L    AST (SGOT) 11 1 - 32 U/L    Alkaline Phosphatase 87 39 - 117 U/L    Total Bilirubin 0.4 0.0 - 1.2 mg/dL    Globulin 2.3 gm/dL    A/G Ratio 1.7 g/dL    BUN/Creatinine Ratio 12.5 7.0 - 25.0    Anion Gap 14.0 5.0 - 15.0 mmol/L    eGFR 82.9 >60.0 mL/min/1.73   Troponin    Collection Time: 10/15/22  8:35 PM    Specimen: Blood   Result Value Ref Range    Troponin T <0.010 0.000 - 0.030 ng/mL   Magnesium    Collection Time: 10/15/22  8:35 PM    Specimen: Blood   Result Value Ref Range    Magnesium 1.7 1.6 - 2.4 mg/dL   Green Top (Gel)    Collection Time: 10/15/22  8:35 PM   Result Value Ref Range    Extra Tube Hold for add-ons.    Lavender Top    Collection Time: 10/15/22  8:35 PM   Result Value Ref Range    Extra Tube hold for add-on    Gold Top - SST    Collection Time: 10/15/22  8:35 PM   Result Value Ref Range    Extra Tube Hold for add-ons.    Light Blue Top    Collection Time: 10/15/22  8:35 PM   Result Value Ref Range    Extra Tube Hold for add-ons.    CBC Auto Differential    Collection Time: 10/15/22  8:35 PM    Specimen: Blood   Result Value Ref Range    WBC 7.56 3.40 - 10.80 10*3/mm3    RBC 4.20 3.77 - 5.28 10*6/mm3    Hemoglobin 13.2 12.0 - 15.9 g/dL    Hematocrit 38.6 34.0 - 46.6 %    MCV 91.9 79.0 - 97.0 fL    MCH 31.4 26.6 - 33.0 pg    MCHC 34.2 31.5 - 35.7 g/dL    RDW 13.7 12.3 - 15.4 %    RDW-SD 46.0 37.0 - 54.0 fl    MPV 8.7 6.0 - 12.0 fL    Platelets 444 140 - 450 10*3/mm3    Neutrophil % 80.5 (H) 42.7 - 76.0 %    Lymphocyte % 11.9 (L) 19.6 - 45.3 %    Monocyte % 6.2 5.0 - 12.0 %    Eosinophil % 0.1 (L) 0.3 - 6.2 %    Basophil % 1.2 0.0 - 1.5 %    Immature Grans % 0.1 0.0 - 0.5 %    Neutrophils, Absolute 6.08 1.70 - 7.00 10*3/mm3    Lymphocytes, Absolute 0.90 0.70 - 3.10 10*3/mm3    Monocytes, Absolute 0.47 0.10 - 0.90 10*3/mm3    Eosinophils, Absolute 0.01 0.00 - 0.40 10*3/mm3    Basophils, Absolute 0.09 0.00 - 0.20 10*3/mm3    Immature Grans, Absolute 0.01 0.00 - 0.05  10*3/mm3    nRBC 0.0 0.0 - 0.2 /100 WBC   Procalcitonin    Collection Time: 10/15/22  8:35 PM    Specimen: Blood   Result Value Ref Range    Procalcitonin <0.02 0.00 - 0.25 ng/mL   Urinalysis With Microscopic If Indicated (No Culture) - Urine, Clean Catch    Collection Time: 10/15/22  8:45 PM    Specimen: Urine, Clean Catch   Result Value Ref Range    Color, UA Yellow Yellow, Straw    Appearance, UA Cloudy (A) Clear    pH, UA 6.5 5.0 - 8.0    Specific Gravity, UA 1.027 1.001 - 1.030    Glucose, UA Negative Negative    Ketones, UA 15 mg/dL (1+) (A) Negative    Bilirubin, UA Negative Negative    Blood, UA Negative Negative    Protein, UA >=300 mg/dL (3+) (A) Negative    Leuk Esterase, UA Negative Negative    Nitrite, UA Negative Negative    Urobilinogen, UA 0.2 E.U./dL 0.2 - 1.0 E.U./dL   Urinalysis, Microscopic Only - Urine, Clean Catch    Collection Time: 10/15/22  8:45 PM    Specimen: Urine, Clean Catch   Result Value Ref Range    RBC, UA 0-2 None Seen, 0-2 /HPF    WBC, UA None Seen None Seen, 0-2 /HPF    Bacteria, UA 2+ (A) None Seen, Trace /HPF    Squamous Epithelial Cells, UA 7-12 (A) None Seen, 0-2 /HPF    Hyaline Casts, UA None Seen 0 - 6 /LPF    Methodology Automated Microscopy    POC Glucose Once    Collection Time: 10/15/22 11:51 PM    Specimen: Blood   Result Value Ref Range    Glucose 71 70 - 130 mg/dL     Note: In addition to lab results from this visit, the labs listed above may include labs taken at another facility or during a different encounter within the last 24 hours. Please correlate lab times with ED admission and discharge times for further clarification of the services performed during this visit.    MRI Brain Without Contrast   Final Result      1. No acute intracranial abnormality. Specifically, negative for acute or subacute infarct.   2. Chronic infarcts in the medial left parietal lobe, left occipital, and left inferior cerebellum.   3. Volume loss and mild chronic small vessel ischemic  changes.      Electronically signed by:  Manny Liang M.D.     10/15/2022 9:34 PM Mountain Time      XR Chest 1 View   Final Result   No acute cardiopulmonary abnormality.      Electronically signed by:  Leland Patel M.D.     10/15/2022 7:15 PM Mountain Time      CT Angiogram Head w AI Analysis of LVO   Final Result   1. Atherosclerosis without significant stenosis or occlusion of the major arteries of the head and neck. Basilar artery is patent. Likely artifact on earlier noncontrast head CT.   2. No perfusion defect..      Report called to stroke navigator lupe armando at 10/15/2022 8:52 PM       Electronically signed by:  Palomo Doty M.D.     10/15/2022 6:54 PM Mountain Time      CT CEREBRAL PERFUSION WITH & WITHOUT CONTRAST   Final Result   1. Atherosclerosis without significant stenosis or occlusion of the major arteries of the head and neck. Basilar artery is patent. Likely artifact on earlier noncontrast head CT.   2. No perfusion defect..      Report called to stroke navigator lupe armando at 10/15/2022 8:52 PM       Electronically signed by:  Palomo Doty M.D.     10/15/2022 6:54 PM Mountain Time      CT Angiogram Neck   Final Result   1. Atherosclerosis without significant stenosis or occlusion of the major arteries of the head and neck. Basilar artery is patent. Likely artifact on earlier noncontrast head CT.   2. No perfusion defect..      Report called to stroke navigator lupe armando at 10/15/2022 8:52 PM       Electronically signed by:  Palomo Doty M.D.     10/15/2022 6:54 PM Mountain Time      CT Head Without Contrast Stroke Protocol   Final Result   1. No acute intracranial hemorrhage. MRI is more sensitive for the detection of acute nonhemorrhagic infarct.   2. Questionable slight hyperdensity to the distal basilar artery. Artifact versus thrombus. Attention on CTA head.   3. Old infarcts in the left cerebellum, left parietal lobe medially and left occipital lobe  inferiorly. MRI can best assess age and assess for any underlying acute infarct. Mild changes small vessel ischemic disease of indeterminate age, presumably    mostly chronic. Volume loss. Atherosclerosis.   4. Trace mastoid effusions. Paranasal sinus disease.      Report called to stroke navigator lupe armando at 10/15/2022 8:12 PM       Electronically signed by:  Palomo Doty M.D.     10/15/2022 6:17 PM Mountain Time        Vitals:    10/15/22 2006 10/15/22 2017 10/15/22 2130 10/15/22 2230   BP: (!) 149/117  170/96 (!) 176/103   Pulse: 114  98 101   Resp: 14      Temp:       TempSrc:       SpO2: 98%  96% 95%   Weight:  63.4 kg (139 lb 12.4 oz)     Height:         Medications   sodium chloride 0.9 % flush 10 mL (has no administration in time range)   levETIRAcetam (KEPPRA) 750 mg in sodium chloride 0.9 % 100 mL IVPB (has no administration in time range)   sodium chloride 0.9 % flush 10 mL (has no administration in time range)   sodium chloride 0.9 % flush 10 mL (has no administration in time range)   atorvastatin (LIPITOR) tablet 40 mg (has no administration in time range)   aspirin chewable tablet 81 mg (has no administration in time range)     Or   aspirin suppository 300 mg (has no administration in time range)   clopidogrel (PLAVIX) tablet 75 mg (has no administration in time range)   busPIRone (BUSPAR) tablet 5 mg (has no administration in time range)   escitalopram (LEXAPRO) tablet 20 mg (has no administration in time range)   gabapentin (NEURONTIN) capsule 400 mg (has no administration in time range)   pantoprazole (PROTONIX) EC tablet 40 mg (has no administration in time range)   sodium chloride 0.9 % flush 10 mL (has no administration in time range)   sodium chloride 0.9 % flush 10 mL (has no administration in time range)   acetaminophen (TYLENOL) tablet 650 mg (has no administration in time range)     Or   acetaminophen (TYLENOL) 160 MG/5ML solution 650 mg (has no administration in time range)      Or   acetaminophen (TYLENOL) suppository 650 mg (has no administration in time range)   nicotine (NICODERM CQ) 21 MG/24HR patch 1 patch (has no administration in time range)   Magnesium Sulfate 2 gram Bolus, followed by 8 gram infusion (total Mg dose 10 grams)- Mg less than or equal to 1mg/dL (has no administration in time range)     Or   Magnesium Sulfate 2 gram / 50mL Infusion (GIVE X 3 BAGS TO EQUAL 6GM TOTAL DOSE) - Mg 1.1 - 1.5 mg/dl (has no administration in time range)     Or   Magnesium Sulfate 4 gram infusion- Mg 1.6-1.9 mg/dL (has no administration in time range)   cefTRIAXone (ROCEPHIN) 1 g/100 mL 0.9% NS (MBP) (1 g Intravenous New Bag 10/16/22 0011)   sodium chloride 0.9 % bolus 1,000 mL (1,000 mL Intravenous New Bag 10/16/22 0010)   sodium chloride 0.9 % infusion (75 mL/hr Intravenous New Bag 10/16/22 0010)   iopamidol (ISOVUE-370) 76 % injection 150 mL (115 mL Intravenous Given 10/15/22 2018)   LORazepam (ATIVAN) injection 2 mg (2 mg Intravenous Given 10/15/22 2018)   levETIRAcetam in NaCl 0.75% (KEPPRA) IVPB 1,000 mg (0 mg Intravenous Stopped 10/15/22 2118)     ECG/EMG Results (last 24 hours)     ** No results found for the last 24 hours. **        ECG 12 Lead    (Results Pending)   ECG 12 Lead    (Results Pending)           MDM    Final diagnoses:   Altered mental status, unspecified altered mental status type   History of seizure   TIA (transient ischemic attack)       ED Disposition  ED Disposition     ED Disposition   Decision to Admit    Condition   --    Comment   Level of Care: Telemetry [5]   Diagnosis: TIA (transient ischemic attack) [701576]   Admitting Physician: RC MAYS [[id number]]   Attending Physician: RC MAYS [[id number]]   Bed Request Comments: tele               No follow-up provider specified.       Medication List      No changes were made to your prescriptions during this visit.          Moises Rainey MD  10/16/22 0011       Moises Rainey MD  10/16/22  0012

## 2022-10-16 NOTE — THERAPY EVALUATION
Acute Care - Speech Language Pathology Initial Evaluation  [facility]     Patient Name: Gabriela Waller  : 1960  MRN: [id number]  Today's Date: 10/16/2022               Admit Date: 10/15/2022     Visit Dx:    ICD-10-CM ICD-9-CM   1. Altered mental status, unspecified altered mental status type  R41.82 780.97   2. History of seizure  Z87.898 V13.89   3. TIA (transient ischemic attack)  G45.9 435.9     Patient Active Problem List   Diagnosis   • Degenerative disc disease, lumbar   • Degenerative disc disease, cervical   • Cervical disc herniation   • Chronic midline low back pain with left-sided sciatica   • TIA (transient ischemic attack)   • Heart disease   • Chronic kidney disease   • Heart failure (HCC)   • Hypertension   • Osteoporosis   • Seizures (HCC)   • AMS (altered mental status)   • Hypomagnesemia     Past Medical History:   Diagnosis Date   • Arthritis    • Chronic kidney disease    • Headache    • Heart disease    • Heart failure (HCC)    • Hypertension    • Low back pain    • Osteoporosis    • Recent urinary tract infection    • Seizures (HCC)      Past Surgical History:   Procedure Laterality Date   •  SECTION     • COLONOSCOPY         SLP Recommendation and Plan  SLP Diagnosis: Mild dysarthria and aphasia (10/16/22 1400)        [facility] [place] Criteria for Skilled Therapy Interventions Met: yes (10/16/22 1400)  Anticipated Discharge Disposition (SLP): inpatient rehabilitation facility (10/16/22 1400)        Predicted Duration Therapy Intervention (Days): until discharge (10/16/22 1400)                    Plan of Care Reviewed With: patient (10/16/22 1500)  Progress: improving (10/16/22 1500)      SLP EVALUATION (last 72 hours)     SLP SLC Evaluation     Row Name 10/16/22 1400                   Communication Assessment/Intervention    Document Type evaluation  -AW        Subjective Information complains of;fatigue  -AW        Patient Observations alert;cooperative  -AW         "Patient/Family/Caregiver Comments/Observations no family present  -AW        Patient Effort adequate  -AW        Symptoms Noted During/After Treatment none  -AW           General Information    Patient Profile Reviewed yes  -AW        Pertinent History Of Current Problem TIA  -AW        Precautions/Limitations, Vision WFL  -AW        Precautions/Limitations, Hearing WFL  -AW        Prior Level of Function-Communication WFL  -AW        Plans/Goals Discussed with patient  -AW        Barriers to Rehab none identified  -AW        Patient's Goals for Discharge return to home  -AW           Pain    Additional Documentation Pain Scale: FACES Pre/Post-Treatment (Group)  -AW           Pain Scale: FACES Pre/Post-Treatment    Pain: FACES Scale, Pretreatment 2-->hurts little bit  -AW        Posttreatment Pain Rating 2-->hurts little bit  -AW           Comprehension Assessment/Intervention    Comprehension Assessment/Intervention Auditory Comprehension  -AW           Auditory Comprehension Assessment/Intervention    Auditory Comprehension (Communication) WFL  -AW        Answers Questions (Communication) wh questions;yes/no;personal;simple;WFL  -AW        Narrative Discourse conversational level;WFL  -AW           Expression Assessment/Intervention    Expression Assessment/Intervention verbal expression  -AW           Verbal Expression Assessment/Intervention    Verbal Expression mild impairment  -AW        Spontaneous/Functional Words simple;mild impairment;complex  difficulty saying the word \"comfortable\" and other delayed responses noted  -AW        Sentence Formulation simple;mild impairment;delayed responses  -AW        Conversational Discourse/Fluency mild impairment  -AW           Oral Motor Structure and Function    Oral Motor Structure and Function mild impairment  -AW        Dentition Assessment natural, present and adequate  -AW           Oral Musculature and Cranial Nerve Assessment    Oral Motor General Assessment " oral labial or buccal impairment  -AW           Motor Speech Assessment/Intervention    Motor Speech Function mild impairment  -AW        Characteristics Consistent with Dysarthria slow rate;decreased articulation  -AW           Cognitive Assessment Intervention- SLP    Cognitive Function (Cognition) WFL  -AW        Orientation Status (Cognition) awareness of basic personal information;person;place;time;situation;WFL  -AW        Memory (Cognitive) unable/difficult to assess;other (see comments)  needs further assessment - pt lethargic and wanting to sleep  -AW           SLP Evaluation Clinical Impressions    SLP Diagnosis Mild dysarthria and aphasia  -AW        Rehab Potential/Prognosis good  -AW        SLC Criteria for Skilled Therapy Interventions Met yes  -AW        Functional Impact functional impact in social situations;functional impact in ADLs  -AW           Recommendations    Therapy Frequency (SLP SLC) 5 days per week  -AW        Predicted Duration Therapy Intervention (Days) until discharge  -AW        Anticipated Discharge Disposition (SLP) inpatient rehabilitation facility  -AW           Communication Treatment Objective and Progress Goals (SLP)    Verbal Expression Treatment Objectives Verbal Expression Treatment Objectives (Group)  -AW        Motor Speech/Dysarthria Treatment Objectives Motor Speech/Dysarthria Treatment Objectives (Group)  -AW        SLC LTGs Patient will demonstrate functional language skills for return to discharge environment  -AW           Verbal Expression Treatment Objectives    Word Retrieval Skills Selection word retrieval, SLP goal 1  -AW           Word Retrieval Skills Goal 1 (SLP)    Improve Word Retrieval Skills By Goal 1 (SLP) confrontational naming task;high frequency;low frequency;responsive naming task;simple;complex  -AW        Time Frame (Word Retrieval Goal 1, SLP) by discharge  -AW           Motor Speech/Dysarthria Treatment Objectives    Articulation Selection  articulation, SLP goal 1  -AW           Articulation Goal 1 (SLP)    Improve Articulation Goal 1 (SLP) by over-articulating in connected speech;90%;independently (over 90% accuracy)  -AW        Time Frame (Articulation Goal 1, SLP) by discharge  -AW           Patient will demonstrate functional language skills for return to discharge environment     Ubly Independently  -AW        Time frame by discharge  -AW              User Key  (r) = Recorded By, (t) = Taken By, (c) = Cosigned By    Initials Name Effective Dates    Itzel Walton MS CCC-SLP 06/16/21 -                    EDUCATION  The patient has been educated in the following areas:     Communication Impairment.           SLP GOALS     Row Name 10/16/22 1400             Word Retrieval Skills Goal 1 (SLP)    Improve Word Retrieval Skills By Goal 1 (SLP) confrontational naming task;high frequency;low frequency;responsive naming task;simple;complex  -AW      Time Frame (Word Retrieval Goal 1, SLP) by discharge  -AW         Articulation Goal 1 (SLP)    Improve Articulation Goal 1 (SLP) by over-articulating in connected speech;90%;independently (over 90% accuracy)  -AW      Time Frame (Articulation Goal 1, SLP) by discharge  -AW            User Key  (r) = Recorded By, (t) = Taken By, (c) = Cosigned By    Initials Name Provider Type    Itzel Walton MS CCC-SLP Speech and Language Pathologist                        Time Calculation:      Time Calculation- SLP     Row Name 10/16/22 1501             Time Calculation- SLP    SLP Start Time 1400  -AW      SLP Stop Time 1430  -AW      SLP Time Calculation (min) 30 min  -AW      SLP Received On 10/16/22  -AW            User Key  (r) = Recorded By, (t) = Taken By, (c) = Cosigned By    Initials Name Provider Type    Itzel Walton MS CCC-SLP Speech and Language Pathologist                Therapy Charges for Today     Code Description Service Date Service Provider Modifiers Qty    [id number] [facility]  EVAL SPEECH AND PROD W LANG  2 10/16/2022 Itzel Mason, MS CCC-SLP GN 1        Patient was not wearing a face mask and did not exhibit coughing during this therapy encounter.  Procedure performed was not aerosolizing, did not involve close contact (within 6 feet for at least 15 minutes or longer), and did not involve contact with infectious secretions or specimens.  Therapist used appropriate personal protective equipment including gloves and standard procedure mask.  Appropriate PPE was worn during the entire therapy session.  Hand hygiene was completed before and after therapy session.                Itzel Mason, MS CCC-SLP  10/16/2022

## 2022-10-16 NOTE — PLAN OF CARE
Goal Outcome Evaluation:  Plan of Care Reviewed With: patient        Progress: no change (PT eval)  Outcome Evaluation: PT evaluation complete. Patient presents with declined functional mobility warranting IPPT to promote return to previous level of function and facilitate independence with mobility. Pt able to perform bed mobility with supervision. Stood from edge of bed two times with mild to moderate unsteadiness initially requiring mod A for recovery and thus deferred gait assessment. Recommend d/c IRF pending progress.

## 2022-10-16 NOTE — THERAPY EVALUATION
Patient Name: Gabriela Waller  : 1960    MRN: [id number]                              Today's Date: 10/16/2022       Admit Date: 10/15/2022    Visit Dx:     ICD-10-CM ICD-9-CM   1. Altered mental status, unspecified altered mental status type  R41.82 780.97   2. History of seizure  Z87.898 V13.89   3. TIA (transient ischemic attack)  G45.9 435.9     Patient Active Problem List   Diagnosis   • Degenerative disc disease, lumbar   • Degenerative disc disease, cervical   • Cervical disc herniation   • Chronic midline low back pain with left-sided sciatica   • TIA (transient ischemic attack)   • Heart disease   • Chronic kidney disease   • Heart failure (HCC)   • Hypertension   • Osteoporosis   • Seizures ([facility])   • AMS (altered mental status)   • Hypomagnesemia     Past Medical History:   Diagnosis Date   • Arthritis    • Chronic kidney disease    • Headache    • Heart disease    • Heart failure (HCC)    • Hypertension    • Low back pain    • Osteoporosis    • Recent urinary tract infection    • Seizures ([facility])      Past Surgical History:   Procedure Laterality Date   •  SECTION     • COLONOSCOPY        General Information     Row Name 10/16/22 0855          Physical Therapy Time and Intention    Document Type evaluation  -     Mode of Treatment physical therapy  -     Row Name 10/16/22 0855          General Information    Patient Profile Reviewed yes  -     Prior Level of Function independent:;all household mobility;community mobility;gait;transfer;bed mobility;min assist:;ADL's;dependent:;driving  Pt reports she uses a cane as needed for longer distances  -     Existing Precautions/Restrictions fall  -     Barriers to Rehab medically complex  -     Row Name 10/16/22 0855          Living Environment    People in Home other (see comments)  daughter and son in law  -     Row Name 10/16/22 0855          Home Main Entrance    Number of Stairs, Main Entrance two  -     Row Name 10/16/22  0855          Stairs Within Home, Primary    Stairs, Within Home, Primary Lives in the basement of her daughter's home  -     Row Name 10/16/22 0855          Cognition    Orientation Status (Cognition) oriented to;person;place;time;disoriented to;situation;other (see comments)  unsure why she was in hospital  -     Row Name 10/16/22 0855          Safety Issues, Functional Mobility    Safety Issues Affecting Function (Mobility) safety precaution awareness;safety precautions follow-through/compliance;sequencing abilities;awareness of need for assistance;insight into deficits/self-awareness  -     Impairments Affecting Function (Mobility) balance;endurance/activity tolerance;pain;strength  -     Comment, Safety Issues/Impairments (Mobility) Noted right hand tremulous activity.  -           User Key  (r) = Recorded By, (t) = Taken By, (c) = Cosigned By    Initials Name Provider Type     Corbin Vaughan, PT Physical Therapist               Mobility     Row Name 10/16/22 0856          Bed Mobility    Bed Mobility supine-sit;scooting/bridging;sit-supine  -     Scooting/Bridging [place] (Bed Mobility) supervision  -     Supine-Sit [place] (Bed Mobility) supervision  -     Sit-Supine [place] (Bed Mobility) supervision  -     Assistive Device (Bed Mobility) bed rails;head of bed elevated  -     Comment, (Bed Mobility) Patient able to sit up to edge of bed without physical assist but was slow and effortful  -     Row Name 10/16/22 0856          Transfers    Comment, (Transfers) Patient stood at edge of bed two times with poor balance displayed. Moderately unsteady with each stand and did not assess further mobility for this reason. Mod A for balance recovery required.  -     Row Name 10/16/22 0856          Sit-Stand Transfer    Sit-Stand [place] (Transfers) contact guard  -     Row Name 10/16/22 0856          Gait/Stairs (Locomotion)    [place] Level (Gait) unable to assess   -           User Key  (r) = Recorded By, (t) = Taken By, (c) = Cosigned By    Initials Name Provider Type     Corbin Vaughan, PT Physical Therapist               Obj/Interventions     Row Name 10/16/22 0858          Range of Motion Comprehensive    General Range of Motion bilateral lower extremity ROM WFL  -     Row Name 10/16/22 0858          Strength Comprehensive (MMT)    General Manual Muscle Testing (MMT) Assessment lower extremity strength deficits identified  -     Comment, General Manual Muscle Testing (MMT) Assessment BLE grossly 4/5  -     Row Name 10/16/22 0858          Balance    Balance Assessment sitting static balance;sitting dynamic balance;sit to stand dynamic balance;standing static balance  -     Static Sitting Balance supervision  -     Dynamic Sitting Balance supervision  -     Position, Sitting Balance unsupported;sitting edge of bed  -     Sit to Stand Dynamic Balance minimal assist;moderate assist  -     Static Standing Balance minimal assist;moderate assist  -     Balance Interventions sitting;standing;sit to stand;supported;static;dynamic;moderate challenge  -     Row Name 10/16/22 0858          Sensory Assessment (Somatosensory)    Sensory Assessment (Somatosensory) LE sensation intact  -           User Key  (r) = Recorded By, (t) = Taken By, (c) = Cosigned By    Initials Name Provider Type     Corbin Vaughan, PT Physical Therapist               Goals/Plan     Row Name 10/16/22 0903          Bed Mobility Goal 1 (PT)    Activity/Assistive Device (Bed Mobility Goal 1, PT) sit to supine;supine to sit  -     [place] Level/Cues Needed (Bed Mobility Goal 1, PT) modified independence  -     Time Frame (Bed Mobility Goal 1, PT) long term goal (LTG);1 week  -     Row Name 10/16/22 0903          Transfer Goal 1 (PT)    Activity/Assistive Device (Transfer Goal 1, PT) sit-to-stand/stand-to-sit  -     [place] Level/Cues Needed (Transfer Goal 1, PT) standby  assist  -     Time Frame (Transfer Goal 1, PT) long term goal (LTG);1 week  -     Row Name 10/16/22 0903          Gait Training Goal 1 (PT)    Activity/Assistive Device (Gait Training Goal 1, PT) gait (walking locomotion)  -     [place] Level (Gait Training Goal 1, PT) contact guard required  -     Distance (Gait Training Goal 1, PT) 50 feet  -     Time Frame (Gait Training Goal 1, PT) long term goal (LTG);1 week  -     Row Name 10/16/22 0903          Therapy Assessment/Plan (PT)    Planned Therapy Interventions (PT) balance training;bed mobility training;gait training;home exercise program;transfer training;strengthening;stair training;neuromuscular re-education;patient/family education  -           User Key  (r) = Recorded By, (t) = Taken By, (c) = Cosigned By    Initials Name Provider Type     Corbin Vaughan, PT Physical Therapist               Clinical Impression     Row Name 10/16/22 0900          Pain    Pretreatment Pain Rating 5/10  -     Posttreatment Pain Rating 5/10  -     Pain Location generalized  -     Pain Location - head  -     Pain Intervention(s) Repositioned  -     Row Name 10/16/22 0900          Plan of Care Review    Plan of Care Reviewed With patient  -     Progress no change  PT eval  -     Outcome Evaluation PT evaluation complete. Patient presents with declined functional mobility warranting IPPT to promote return to previous level of function and facilitate independence with mobility. Pt able to perform bed mobility with supervision. Stood from edge of bed two times with mild to moderate unsteadiness initially requiring mod A for recovery and thus deferred gait assessment. Recommend d/c IRF pending progress.  -     Row Name 10/16/22 0900          Therapy Assessment/Plan (PT)    Patient/Family Therapy Goals Statement (PT) improve mobility  -     Rehab Potential (PT) good, to achieve stated therapy goals  -     Criteria for Skilled Interventions Met  (PT) yes;meets criteria;skilled treatment is necessary  -     Therapy Frequency (PT) daily  -     Predicted Duration of Therapy Intervention (PT) 1 week  -     Row Name 10/16/22 0900          Vital Signs    Pre Systolic BP Rehab 162  -     Pre Treatment Diastolic BP 86  -JH     Post Systolic BP Rehab 173  -     Post Treatment Diastolic BP 95  -JH     Pretreatment Heart Rate (beats/min) 92  -     Posttreatment Heart Rate (beats/min) 91  -     Pre SpO2 (%) 96  -     O2 Delivery Pre Treatment room air  -     O2 Delivery Intra Treatment room air  -     Post SpO2 (%) 94  -JH     O2 Delivery Post Treatment room air  -     Pre Patient Position Supine  -     Intra Patient Position Standing  -     Post Patient Position Supine  -     Row Name 10/16/22 0900          Positioning and Restraints    Pre-Treatment Position in bed  -     Post Treatment Position bed  -     In Bed notified nsg;supine;call light within reach;encouraged to call for assist;exit alarm on  rails up with seizure pads in place  -           User Key  (r) = Recorded By, (t) = Taken By, (c) = Cosigned By    Initials Name Provider Type     Corbin Vaughan, PT Physical Therapist               Outcome Measures     Row Name 10/16/22 0903 10/16/22 0000       How much help from another person do you currently need...    Turning from your back to your side while in flat bed without using bedrails? 4  - 4  -JJ    Moving from lying on back to sitting on the side of a flat bed without bedrails? 3  - 3  -JJ    Moving to and from a bed to a chair (including a wheelchair)? 2  - 2  -JJ    Standing up from a chair using your arms (e.g., wheelchair, bedside chair)? 2  - 2  -JJ    Climbing 3-5 steps with a railing? 1  - 1  -JJ    To walk in hospital room? 2  - 2  -JJ    AM-PAC 6 Clicks Score (PT) 14  - 14  -JJ    Highest level of mobility 4 --> Transferred to chair/commode  - 4 --> Transferred to chair/commode  -JJ    Row Name  10/16/22 0903          Modified [place] Scale    Pre-Stroke Modified [place] Scale 6 - Unable to determine (UTD) from the medical record documentation  -     Modified [place] Scale 4 - Moderately severe disability.  Unable to walk without assistance, and unable to attend to own bodily needs without assistance.  -     Row Name 10/16/22 0903          Functional Assessment    Outcome Measure Options AM-PAC 6 Clicks Basic Mobility (PT);Modified [place]  -           User Key  (r) = Recorded By, (t) = Taken By, (c) = Cosigned By    Initials Name Provider Type    Jessenia Wyman, RN Registered Nurse    Corbin Rodríguez, PT Physical Therapist                             Physical Therapy Education     Title: PT OT SLP Therapies (Done)     Topic: Physical Therapy (Done)     Point: Mobility training (Done)     Learning Progress Summary           Patient Acceptance, E,TB, VU by  at 10/16/2022 0904    Comment: Education to patient on PT services, POC, discharge recommendations, safety with mobility.                   Point: Home exercise program (Done)     Learning Progress Summary           Patient Acceptance, E,TB, VU by  at 10/16/2022 0904    Comment: Education to patient on PT services, POC, discharge recommendations, safety with mobility.                   Point: Body mechanics (Done)     Learning Progress Summary           Patient Acceptance, E,TB, VU by  at 10/16/2022 0904    Comment: Education to patient on PT services, POC, discharge recommendations, safety with mobility.                   Point: Precautions (Done)     Learning Progress Summary           Patient Acceptance, E,TB, VU by  at 10/16/2022 0904    Comment: Education to patient on PT services, POC, discharge recommendations, safety with mobility.                               User Key     Initials Effective Dates Name Provider Type Discipline     09/22/20 -  Corbin Vaughan, PT Physical Therapist PT              PT Recommendation and  Plan  Planned Therapy Interventions (PT): balance training, bed mobility training, gait training, home exercise program, transfer training, strengthening, stair training, neuromuscular re-education, patient/family education  Plan of Care Reviewed With: patient  Progress: no change (PT eval)  Outcome Evaluation: PT evaluation complete. Patient presents with declined functional mobility warranting IPPT to promote return to previous level of function and facilitate independence with mobility. Pt able to perform bed mobility with supervision. Stood from edge of bed two times with mild to moderate unsteadiness initially requiring mod A for recovery and thus deferred gait assessment. Recommend d/c IRF pending progress.     Time Calculation:    PT Charges     Row Name 10/16/22 0905             Time Calculation    Start Time 0820  -JH      PT Received On 10/16/22  -      PT Goal Re-Cert Due Date 10/26/22  -         Untimed Charges    PT Eval/Re-eval Minutes 51  -JH         Total Minutes    Untimed Charges Total Minutes 51  -JH       Total Minutes 51  -JH            User Key  (r) = Recorded By, (t) = Taken By, (c) = Cosigned By    Initials Name Provider Type    Corbin Rodríguez, PT Physical Therapist              Therapy Charges for Today     Code Description Service Date Service Provider Modifiers Qty    [id number] HC PT EVAL LOW COMPLEXITY 4 10/16/2022 Corbin Vaughan, PT GP 1          PT G-Codes  Outcome Measure Options: AM-PAC 6 Clicks Basic Mobility (PT), Modified [place]  AM-PAC 6 Clicks Score (PT): 14  Modified [place] Scale: 4 - Moderately severe disability.  Unable to walk without assistance, and unable to attend to own bodily needs without assistance.    Corbin Vaughan PT  10/16/2022

## 2022-10-16 NOTE — H&P
[facility] Medicine Services  HISTORY AND PHYSICAL    Patient Name: Gabriela Waller  : 1960  MRN: [id number]  Primary Care Physician: Margarita Lynch MD  Date of admission: 10/15/2022      Subjective   Subjective     Chief Complaint:  Difficulty speaking    HPI:  Gabriela Waller is a 61 y.o. female with a PMH significant for history of stroke with no residual deficits, CHF, CAD, seizures, HTN, CKD who comes to the ED due to difficulty speaking.  Patient is confused, says she doesn't know why she is here, largely noncontributory to HPI.  Per EMR around 7 PM, patient was on the phone with her  when she acutely was unable to speak.  Family went downstairs to check on her, patient was reported to have convulsive-like activity with decreased level of responsiveness.  In route to the hospital, EMS reported expressive aphasia and occultly with coordination of left arm.  Pt does report dysuria.  She denies pain, N/V/D, cough, chest pain.      Review of Systems   Unable to perform ROS: Mental status change        All other systems reviewed and are negative.     Personal History     Past Medical History:   Diagnosis Date   • Arthritis    • Chronic kidney disease    • Headache    • Heart disease    • Heart failure (HCC)    • Hypertension    • Low back pain    • Osteoporosis    • Recent urinary tract infection    • Seizures (HCC)              Past Surgical History:   Procedure Laterality Date   •  SECTION     • COLONOSCOPY         Family History:  family history includes Arthritis in her mother; Asthma in her father; Diabetes in her mother; Heart disease in her father and mother; Hypertension in her father and mother; Kidney disease in her father and sister. Otherwise pertinent FHx was reviewed and unremarkable.     Social History:  reports that she has been smoking cigarettes. She has been smoking an average of 1.5 packs per day. She has never used smokeless tobacco.  She reports current alcohol use. She reports that she does not use drugs.  Social History     Social History Narrative   • Not on file       Medications:  Available home medication information reviewed.  (Not in a hospital admission)      No Known Allergies    Objective   Objective     Vital Signs:   Temp:  [99 °F (37.2 °C)] 99 °F (37.2 °C)  Heart Rate:  [] 98  Resp:  [14-20] 14  BP: (149-176)/() 170/96  Total (NIH Stroke Scale): 1    Physical Exam   Constitutional: Awake, alert  Eyes: PERRLA, sclerae anicteric, no conjunctival injection  HENT: NCAT, mucous membranes moist  Neck: Supple, no thyromegaly, no lymphadenopathy, trachea midline  Respiratory: Clear to auscultation bilaterally, nonlabored respirations   Cardiovascular: RRR, no murmurs, rubs, or gallops, palpable pedal pulses bilaterally  Gastrointestinal: Positive bowel sounds, soft, nontender, nondistended  Musculoskeletal: No bilateral ankle edema, no clubbing or cyanosis to extremities  Psychiatric: Appropriate affect, cooperative  Neurologic: Oriented to person only, strength symmetric in all extremities, Cranial Nerves grossly intact to confrontation, speech minimal  Skin: No rashes      Result Review:  I have personally reviewed the results from the time of this admission to 10/15/2022 22:41 EDT and agree with these findings:  [x]  Laboratory list / accordion  []  Microbiology  [x]  Radiology  [x]  EKG/Telemetry   []  Cardiology/Vascular   []  Pathology  [x]  Old records  []  Other:          LAB RESULTS:      Lab 10/15/22  2035 10/15/22  2008 10/15/22  2007   WBC 7.56  --   --    HEMOGLOBIN 13.2  --   --    HEMOGLOBIN, POC  --  15.3  --    HEMATOCRIT 38.6  --   --    HEMATOCRIT POC  --  45  --    PLATELETS 444  --   --    NEUTROS ABS 6.08  --   --    IMMATURE GRANS (ABS) 0.01  --   --    LYMPHS ABS 0.90  --   --    MONOS ABS 0.47  --   --    EOS ABS 0.01  --   --    MCV 91.9  --   --    PROTIME  --   --  12.7*   INR  --   --  1.1          Lab 10/15/22  2035 10/15/22  2008   SODIUM 133*  --    POTASSIUM 3.9  --    CHLORIDE 97*  --    CO2 22.0  --    ANION GAP 14.0  --    BUN 10  --    CREATININE 0.80 0.80   EGFR 82.9 82.9   GLUCOSE 71  --    CALCIUM 8.3*  --    MAGNESIUM 1.7  --          Lab 10/15/22  2035   TOTAL PROTEIN 6.3   ALBUMIN 4.00   GLOBULIN 2.3   ALT (SGPT) 6   AST (SGOT) 11   BILIRUBIN 0.4   ALK PHOS 87         Lab 10/15/22  2035   TROPONIN T <0.010                 UA    Urinalysis 10/15/22 10/15/22    2045 2045   Squamous Epithelial Cells, UA  7-12 (A)   Specific Gravity, UA 1.027    Ketones, UA 15 mg/dL (1+) (A)    Blood, UA Negative    Leukocytes, UA Negative    Nitrite, UA Negative    RBC, UA  0-2   WBC, UA  None Seen   Bacteria, UA  2+ (A)   (A) Abnormal value              Microbiology Results (last 10 days)     ** No results found for the last 240 hours. **          CT Angiogram Neck    Result Date: 10/15/2022  CTA HEAD WITH CONTRAST. CTA NECK WITH CONTRAST. CT PERFUSION BRAIN. DATE: 10/15/2022 6:07 PM  INDICATION:  Code stroke, left sided weakness, expressive aphasia.  COMPARISON: CT head from earlier today.  TECHNIQUE:   Thin section axial images through the head and neck after IV contrast. CT perfusion images through the brain. Two dimensional and three dimensional reformations. NASCET criteria used to determine stenosis.115 mL Isovue 370 given IV. Low-dose  CT acquisition technique included one of the following options: 1. Automated exposure control, 2. Adjustment of mA and/or KV according to patient's size and/or 3. Use of iterative reconstruction. FINDINGS:   CTA NECK: Aortic arch: Normal triple vessel anatomy. Atherosclerosis. Right common carotid artery: Normal origin. Calcified and noncalcified plaque distally. No significant stenosis, occlusion. Right internal carotid artery: Calcified plaque proximally less than 50% stenosis. Calcifications distally. No significant stenosis, occlusion. Right vertebral artery: Normal  origin. Calcifications distally. No significant stenosis, occlusion. Left common carotid artery: Normal origin. Scattered calcified and noncalcified plaque. No significant stenosis, occlusion. Left internal carotid artery: Calcified and noncalcified plaque proximally. Calcifications distally. No significant stenosis, occlusion. Left vertebral artery: Normal origin. Calcifications distally. No significant stenosis, occlusion. Parotid and submandibular glands are intact. Posterior nasopharynx, oropharynx, hypopharynx, larynx, thyroid gland, proximal trachea, imaged superior mediastinum  are patent. Lymph nodes in the neck do not meet the CT size criteria for lymphadenopathy. Degenerative changes of the spine. Centrilobular and paraseptal emphysema in the lung apices. CTA HEAD: No large aneurysm, significant stenosis or occlusion of the proximal major cerebral arteries. Major dural venous sinuses are patent. CT PERFUSION BRAIN: Cerebral blood flow, blood volume and mean transit time maps are symmetric without perfusion defect. CBF<30% vol: 0 mL Tmax>6s vol: 0 mL     Impression: 1. Atherosclerosis without significant stenosis or occlusion of the major arteries of the head and neck. Basilar artery is patent. Likely artifact on earlier noncontrast head CT. 2. No perfusion defect.. Report called to stroke navigator lupe armando at 10/15/2022 8:52 PM Electronically signed by:  Palomo Doty M.D.  10/15/2022 6:54 PM Mountain Time    XR Chest 1 View    Result Date: 10/15/2022  FRONTAL VIEW OF THE CHEST CLINICAL INDICATION: Dizziness. COMPARISON: 9/13/2021. FINDINGS: No focal consolidation, pleural effusion or pneumothorax. Cardiomediastinal morphology is normal.     Impression: No acute cardiopulmonary abnormality. Electronically signed by:  Leland Patel M.D.  10/15/2022 7:15 PM Mountain Time    CT Head Without Contrast Stroke Protocol    Result Date: 10/15/2022  EXAMINATION: CT HEAD WITHOUT CONTRAST DATE:  10/15/2022 5:58 PM  INDICATION: Code stroke, expressive aphasia, left sided weakness, confusion.  COMPARISON: None.  TECHNIQUE:  Routine axial images through the head without contrast. Coronal reformations. Low-dose CT acquisition technique included one or more of the following options: 1. Automated exposure control, 2. Adjustment of mA and/or KV according to patient's size and/or 3. Use of iterative reconstruction. FINDINGS:  Intracranial contents: Ventricular and cisternal spaces are prominent from volume loss. Mild periventricular and subcortical white matter hypodensities. Well-defined hypodensities in the left cerebellum with the larger focus measuring 5 x 10 mm. Hypodensity/encephalomalacia left parietal lobe medially measures 1.2 x 1.4 cm. Small area of encephalomalacia left occipital lobe inferiorly. No dominant mass, midline shift, hydrocephalus, extra axial fluid collection or acute hemorrhage. Distal basilar artery is slightly hyperdense.. Craniocervical junction is patent. Bones and extracranial soft tissues: Mild mucosal thickening ethmoid air cells, maxillary sinuses. Partial opacification right mastoid air cells greater than left. Otherwise, Visualized paranasal sinuses and mastoid air cells are clear.  Skull is intact. Visualized intraorbital contents are  unremarkable. Calcifications distal internal carotid arteries and distal vertebral arteries.     Impression: 1. No acute intracranial hemorrhage. MRI is more sensitive for the detection of acute nonhemorrhagic infarct. 2. Questionable slight hyperdensity to the distal basilar artery. Artifact versus thrombus. Attention on CTA head. 3. Old infarcts in the left cerebellum, left parietal lobe medially and left occipital lobe inferiorly. MRI can best assess age and assess for any underlying acute infarct. Mild changes small vessel ischemic disease of indeterminate age, presumably mostly chronic. Volume loss. Atherosclerosis. 4. Trace mastoid effusions.  Paranasal sinus disease. Report called to stroke navigator lupe armando at 10/15/2022 8:12 PM Electronically signed by:  Palomo Doty M.D.  10/15/2022 6:17 PM Mountain Time    CT Angiogram Head w AI Analysis of LVO    Result Date: 10/15/2022  CTA HEAD WITH CONTRAST. CTA NECK WITH CONTRAST. CT PERFUSION BRAIN. DATE: 10/15/2022 6:07 PM  INDICATION:  Code stroke, left sided weakness, expressive aphasia.  COMPARISON: CT head from earlier today.  TECHNIQUE:   Thin section axial images through the head and neck after IV contrast. CT perfusion images through the brain. Two dimensional and three dimensional reformations. NASCET criteria used to determine stenosis.115 mL Isovue 370 given IV. Low-dose  CT acquisition technique included one of the following options: 1. Automated exposure control, 2. Adjustment of mA and/or KV according to patient's size and/or 3. Use of iterative reconstruction. FINDINGS:   CTA NECK: Aortic arch: Normal triple vessel anatomy. Atherosclerosis. Right common carotid artery: Normal origin. Calcified and noncalcified plaque distally. No significant stenosis, occlusion. Right internal carotid artery: Calcified plaque proximally less than 50% stenosis. Calcifications distally. No significant stenosis, occlusion. Right vertebral artery: Normal origin. Calcifications distally. No significant stenosis, occlusion. Left common carotid artery: Normal origin. Scattered calcified and noncalcified plaque. No significant stenosis, occlusion. Left internal carotid artery: Calcified and noncalcified plaque proximally. Calcifications distally. No significant stenosis, occlusion. Left vertebral artery: Normal origin. Calcifications distally. No significant stenosis, occlusion. Parotid and submandibular glands are intact. Posterior nasopharynx, oropharynx, hypopharynx, larynx, thyroid gland, proximal trachea, imaged superior mediastinum  are patent. Lymph nodes in the neck do not meet the CT size criteria  for lymphadenopathy. Degenerative changes of the spine. Centrilobular and paraseptal emphysema in the lung apices. CTA HEAD: No large aneurysm, significant stenosis or occlusion of the proximal major cerebral arteries. Major dural venous sinuses are patent. CT PERFUSION BRAIN: Cerebral blood flow, blood volume and mean transit time maps are symmetric without perfusion defect. CBF<30% vol: 0 mL Tmax>6s vol: 0 mL     Impression: 1. Atherosclerosis without significant stenosis or occlusion of the major arteries of the head and neck. Basilar artery is patent. Likely artifact on earlier noncontrast head CT. 2. No perfusion defect.. Report called to stroke navigator lupe armando at 10/15/2022 8:52 PM Electronically signed by:  Palomo Doty M.D.  10/15/2022 6:54 PM Mountain Time    CT CEREBRAL PERFUSION WITH & WITHOUT CONTRAST    Result Date: 10/15/2022  CTA HEAD WITH CONTRAST. CTA NECK WITH CONTRAST. CT PERFUSION BRAIN. DATE: 10/15/2022 6:07 PM  INDICATION:  Code stroke, left sided weakness, expressive aphasia.  COMPARISON: CT head from earlier today.  TECHNIQUE:   Thin section axial images through the head and neck after IV contrast. CT perfusion images through the brain. Two dimensional and three dimensional reformations. NASCET criteria used to determine stenosis.115 mL Isovue 370 given IV. Low-dose  CT acquisition technique included one of the following options: 1. Automated exposure control, 2. Adjustment of mA and/or KV according to patient's size and/or 3. Use of iterative reconstruction. FINDINGS:   CTA NECK: Aortic arch: Normal triple vessel anatomy. Atherosclerosis. Right common carotid artery: Normal origin. Calcified and noncalcified plaque distally. No significant stenosis, occlusion. Right internal carotid artery: Calcified plaque proximally less than 50% stenosis. Calcifications distally. No significant stenosis, occlusion. Right vertebral artery: Normal origin. Calcifications distally. No  significant stenosis, occlusion. Left common carotid artery: Normal origin. Scattered calcified and noncalcified plaque. No significant stenosis, occlusion. Left internal carotid artery: Calcified and noncalcified plaque proximally. Calcifications distally. No significant stenosis, occlusion. Left vertebral artery: Normal origin. Calcifications distally. No significant stenosis, occlusion. Parotid and submandibular glands are intact. Posterior nasopharynx, oropharynx, hypopharynx, larynx, thyroid gland, proximal trachea, imaged superior mediastinum  are patent. Lymph nodes in the neck do not meet the CT size criteria for lymphadenopathy. Degenerative changes of the spine. Centrilobular and paraseptal emphysema in the lung apices. CTA HEAD: No large aneurysm, significant stenosis or occlusion of the proximal major cerebral arteries. Major dural venous sinuses are patent. CT PERFUSION BRAIN: Cerebral blood flow, blood volume and mean transit time maps are symmetric without perfusion defect. CBF<30% vol: 0 mL Tmax>6s vol: 0 mL     Impression: 1. Atherosclerosis without significant stenosis or occlusion of the major arteries of the head and neck. Basilar artery is patent. Likely artifact on earlier noncontrast head CT. 2. No perfusion defect.. Report called to stroke navigator lupe armando at 10/15/2022 8:52 PM Electronically signed by:  Palomo Doty M.D.  10/15/2022 6:54 PM Mountain Time          Assessment & Plan   Assessment & Plan     Active Hospital Problems    Diagnosis  POA   • **AMS (altered mental status) [R41.82]  Unknown   • TIA (transient ischemic attack) [G45.9]  Yes   • Heart disease [I51.9]  Unknown   • Chronic kidney disease [N18.9]  Unknown   • Heart failure (HCC) [I50.9]  Unknown   • Hypertension [I10]  Unknown   • Osteoporosis [M81.0]  Unknown   • Seizures (HCC) [R56.9]  Unknown   • Hypomagnesemia [E83.42]  Unknown   Gabriela Waller is a 61 y.o. female with a PMH significant for history of  stroke with no residual deficits, CHF, CAD, seizures, HTN, CKD who comes to the ED due to difficulty speaking.      TIA  --stroke yury, neurology consulted  --serial NIH/neurochecks  --aspirin 324 given  --continue aspirin 81mg oral/ 300 rectal daily  --high intensity statin  --lipid panel, a1c, tsh  --echo with bubble  --CT head, CTA head and neck, CT perfusion within normal limits  --MRI pending  --PT/OT/SLP  --Telemetry  --Permissive hypertension pending MRI results    Altered mental status  - Low-grade temperature, left shift on CBC.   -UA not consistent with UTI (nitrite neg, leuk est neg, no WBCs) but has 2+ bacteria.  It was reported to stroke team, pt had recent UTI and abx.  She denied abx but did c/o dysuria.  Will add urine cx and add rocephin for now.  - Chest x-ray without acute finding  - Lactic acid, procalcitonin, blood cultures pending  - Consider chest CT  - Low threshold to add on antibiotics  - Respiratory panel pending    Seizures  - Seizure precautions  - Home Keppra dose increased by neurology    Hypomagnesemia  - Replace per protocol  - A.m. lab    CHF  - TTE for a.m.  - Monitor daily weights, strict intake and output    HTN  - Holding home antihypertensives pending MRI      DVT prophylaxis: SCDs      CODE STATUS: Full code by default, patient confused no family present  Code Status and Medical Interventions:   Ordered at: 10/15/22 2229     Code Status (Patient has no pulse and is not breathing):    CPR (Attempt to Resuscitate)     Medical Interventions (Patient has pulse or is breathing):    Full Support         Adriana Byrnes, DO  10/15/22

## 2022-10-17 ENCOUNTER — APPOINTMENT (OUTPATIENT)
Dept: NEUROLOGY | Facility: HOSPITAL | Age: 62
End: 2022-10-17

## 2022-10-17 ENCOUNTER — READMISSION MANAGEMENT (OUTPATIENT)
Dept: CALL CENTER | Facility: HOSPITAL | Age: 62
End: 2022-10-17

## 2022-10-17 ENCOUNTER — HOME HEALTH ADMISSION (OUTPATIENT)
Dept: HOME HEALTH SERVICES | Facility: HOME HEALTHCARE | Age: 62
End: 2022-10-17

## 2022-10-17 VITALS
WEIGHT: 125 LBS | HEIGHT: 60 IN | RESPIRATION RATE: 20 BRPM | DIASTOLIC BLOOD PRESSURE: 97 MMHG | HEART RATE: 60 BPM | TEMPERATURE: 97.6 F | OXYGEN SATURATION: 95 % | SYSTOLIC BLOOD PRESSURE: 150 MMHG | BODY MASS INDEX: 24.54 KG/M2

## 2022-10-17 PROBLEM — G45.9 TIA (TRANSIENT ISCHEMIC ATTACK): Status: RESOLVED | Noted: 2022-10-15 | Resolved: 2022-10-17

## 2022-10-17 LAB
ALBUMIN SERPL-MCNC: 3.8 G/DL (ref 3.5–5.2)
ALBUMIN/GLOB SERPL: 2 G/DL
ALP SERPL-CCNC: 81 U/L (ref 39–117)
ALT SERPL W P-5'-P-CCNC: 5 U/L (ref 1–33)
ANION GAP SERPL CALCULATED.3IONS-SCNC: 10 MMOL/L (ref 5–15)
AST SERPL-CCNC: 10 U/L (ref 1–32)
BACTERIA SPEC AEROBE CULT: NORMAL
BILIRUB SERPL-MCNC: 0.4 MG/DL (ref 0–1.2)
BUN SERPL-MCNC: 10 MG/DL (ref 8–23)
BUN/CREAT SERPL: 11.2 (ref 7–25)
CALCIUM SPEC-SCNC: 9.1 MG/DL (ref 8.6–10.5)
CHLORIDE SERPL-SCNC: 104 MMOL/L (ref 98–107)
CO2 SERPL-SCNC: 24 MMOL/L (ref 22–29)
CREAT SERPL-MCNC: 0.89 MG/DL (ref 0.57–1)
DEPRECATED RDW RBC AUTO: 46.1 FL (ref 37–54)
EGFRCR SERPLBLD CKD-EPI 2021: 73 ML/MIN/1.73
ERYTHROCYTE [DISTWIDTH] IN BLOOD BY AUTOMATED COUNT: 13.4 % (ref 12.3–15.4)
GLOBULIN UR ELPH-MCNC: 1.9 GM/DL
GLUCOSE SERPL-MCNC: 81 MG/DL (ref 65–99)
HCT VFR BLD AUTO: 40.7 % (ref 34–46.6)
HGB BLD-MCNC: 13.4 G/DL (ref 12–15.9)
MCH RBC QN AUTO: 31 PG (ref 26.6–33)
MCHC RBC AUTO-ENTMCNC: 32.9 G/DL (ref 31.5–35.7)
MCV RBC AUTO: 94.2 FL (ref 79–97)
PLATELET # BLD AUTO: 444 10*3/MM3 (ref 140–450)
PMV BLD AUTO: 9.1 FL (ref 6–12)
POTASSIUM SERPL-SCNC: 4 MMOL/L (ref 3.5–5.2)
PROT SERPL-MCNC: 5.7 G/DL (ref 6–8.5)
RBC # BLD AUTO: 4.32 10*6/MM3 (ref 3.77–5.28)
SODIUM SERPL-SCNC: 138 MMOL/L (ref 136–145)
WBC NRBC COR # BLD: 6.5 10*3/MM3 (ref 3.4–10.8)

## 2022-10-17 PROCEDURE — 85027 COMPLETE CBC AUTOMATED: CPT | Performed by: HOSPITALIST

## 2022-10-17 PROCEDURE — 99217 PR OBSERVATION CARE DISCHARGE MANAGEMENT: CPT | Performed by: HOSPITALIST

## 2022-10-17 PROCEDURE — 80053 COMPREHEN METABOLIC PANEL: CPT | Performed by: HOSPITALIST

## 2022-10-17 PROCEDURE — G0378 HOSPITAL OBSERVATION PER HR: HCPCS

## 2022-10-17 PROCEDURE — 96376 TX/PRO/DX INJ SAME DRUG ADON: CPT

## 2022-10-17 PROCEDURE — 99213 OFFICE O/P EST LOW 20 MIN: CPT | Performed by: PSYCHIATRY & NEUROLOGY

## 2022-10-17 PROCEDURE — 25010000002 LEVETRIRACETAM PER 10 MG: Performed by: INTERNAL MEDICINE

## 2022-10-17 PROCEDURE — 95816 EEG AWAKE AND DROWSY: CPT

## 2022-10-17 RX ORDER — LEVETIRACETAM 750 MG/1
750 TABLET ORAL 2 TIMES DAILY
Qty: 180 TABLET | Refills: 0 | Status: ON HOLD | OUTPATIENT
Start: 2022-10-17 | End: 2023-03-20 | Stop reason: SDUPTHER

## 2022-10-17 RX ORDER — ATORVASTATIN CALCIUM 40 MG/1
80 TABLET, FILM COATED ORAL NIGHTLY
Status: DISCONTINUED | OUTPATIENT
Start: 2022-10-17 | End: 2022-10-17 | Stop reason: HOSPADM

## 2022-10-17 RX ORDER — ATORVASTATIN CALCIUM 80 MG/1
80 TABLET, FILM COATED ORAL NIGHTLY
Qty: 90 TABLET | Refills: 0 | Status: SHIPPED | OUTPATIENT
Start: 2022-10-17

## 2022-10-17 RX ADMIN — LOSARTAN POTASSIUM 100 MG: 50 TABLET, FILM COATED ORAL at 08:53

## 2022-10-17 RX ADMIN — ASPIRIN 81 MG: 81 TABLET, COATED ORAL at 08:54

## 2022-10-17 RX ADMIN — SPIRONOLACTONE 25 MG: 25 TABLET ORAL at 08:54

## 2022-10-17 RX ADMIN — Medication: at 13:07

## 2022-10-17 RX ADMIN — CLOPIDOGREL BISULFATE 75 MG: 75 TABLET ORAL at 08:53

## 2022-10-17 RX ADMIN — GABAPENTIN 400 MG: 400 CAPSULE ORAL at 08:54

## 2022-10-17 RX ADMIN — BUSPIRONE HYDROCHLORIDE 5 MG: 10 TABLET ORAL at 08:54

## 2022-10-17 RX ADMIN — Medication 10 ML: at 08:51

## 2022-10-17 RX ADMIN — LEVETIRACETAM 750 MG: 100 INJECTION, SOLUTION INTRAVENOUS at 09:02

## 2022-10-17 RX ADMIN — PANTOPRAZOLE SODIUM 40 MG: 40 TABLET, DELAYED RELEASE ORAL at 05:58

## 2022-10-17 RX ADMIN — AMLODIPINE BESYLATE 10 MG: 10 TABLET ORAL at 08:53

## 2022-10-17 RX ADMIN — ESCITALOPRAM OXALATE 20 MG: 20 TABLET ORAL at 08:54

## 2022-10-17 RX ADMIN — Medication 1 PATCH: at 08:54

## 2022-10-17 NOTE — CONSULTS
Diabetes Education  Assessment/Teaching    Patient Name:  Gabriela Waller  YOB: 1960  MRN: [id number]  Admit Date:  10/15/2022      Assessment Date:  10/17/2022    Order criteria not met for diabetes education consult. Current A1c is 5.7%, noted during chart review no history of DM and no home meds for DM. Patient does not qualify for the follow up stroke class based on the exclusion criteria. Thank you.     Electronically signed by:  Mariajose Banegas RN  10/17/22 14:06 EDT

## 2022-10-17 NOTE — PROGRESS NOTES
Neurology       Patient Care Team:  Margarita Lynch MD as PCP - General (Emergency Medicine)  Moises Singh MD as Referring Physician (Pain Medicine)    Chief complaint: Breakthrough seizure    History: No further seizures with increase Keppra 750 mg twice daily.    Patient is not particularly sedated.    Failure knowledges that she has trouble keeping her pills straight taking them sometimes in the morning sometimes at night.    She tends to take the meds once a day.      Past Medical History:   Diagnosis Date   • Arthritis    • Chronic kidney disease    • Headache    • Heart disease    • Heart failure (HCC)    • Hypertension    • Low back pain    • Osteoporosis    • Recent urinary tract infection    • Seizures (HCC)        Vital Signs   Vitals:    10/17/22 0853 10/17/22 1000 10/17/22 1027 10/17/22 1049   BP: 155/81   150/97   BP Location:    Right arm   Patient Position:    Sitting   Pulse: 53 68 53 72   Resp:    20   Temp:    97.6 °F (36.4 °C)   TempSrc:    Oral   SpO2:  97%  97%   Weight:       Height:           Physical Exam:   General: Awake and              Neuro: Oriented to person place and time.    Speech is articulate with no word finding problems.    Cranial nerves show full eye movements equal pupils symmetrical face and normal facial movements.  She has good palate elevation and tongue protrudes normally.    Reflexes are 1+ and equal bilaterally.     are equal with antigravity in both legs.        Results Review:    TTE shows ejection fraction of 55% with a negative bubble study      MRI shows old strokes in the left parietal left occipital and left cerebellum.    LDL cholesterol is 133.    P2 Y 12 is 130.    Globin A1c is mildly elevated.    EEG showed intermittent left temporal slowing  Results from last 7 days   Lab Units 10/17/22  0558   WBC 10*3/mm3 6.50   HEMOGLOBIN g/dL 13.4   HEMATOCRIT % 40.7   PLATELETS 10*3/mm3 444     Results from last 7 days   Lab Units 10/17/22  0558  10/16/22  0636 10/15/22  2035   SODIUM mmol/L 138 136 133*   POTASSIUM mmol/L 4.0 3.6 3.9   CHLORIDE mmol/L 104 99 97*   CO2 mmol/L 24.0 22.0 22.0   BUN mg/dL 10 7* 10   CREATININE mg/dL 0.89 0.72 0.80   CALCIUM mg/dL 9.1 9.2 8.3*   BILIRUBIN mg/dL 0.4  --  0.4   ALK PHOS U/L 81  --  87   ALT (SGPT) U/L 5  --  6   AST (SGOT) U/L 10  --  11   GLUCOSE mg/dL 81 67 71       Imaging Results (Last 24 Hours)     ** No results found for the last 24 hours. **          Assessment:  Breakthrough seizures secondary to medication lapse.    Dyslipidemia, poorly controlled.    Multiple remote strokes.        Plan:  Increase Lipitor to 80 mg daily.    Increase Keppra to 750 mg twice daily or alternatively 1500 mg once a day if tolerated.    Comment:  Okay to discharge.    Outpatient follow-up with neurology of choice.         I discussed the patients findings and my recommendations with patient, nursing staff and consulting provider    Gary Carreon MD  10/17/22  10:56 EDT

## 2022-10-17 NOTE — DISCHARGE SUMMARY
[facility] Medicine Services  DISCHARGE SUMMARY    Patient Name: Gabriela Waller  : 1960  MRN: [id number]    Date of Admission: 10/15/2022  8:00 PM  Date of Discharge:  10/17/22  Primary Care Physician: Margarita Lynch MD    Consults     Date and Time Order Name Status Description    10/16/2022  6:13 AM Inpatient Neurology Consult General      10/15/2022 10:10 PM Inpatient Neurology Consult Stroke Completed           Hospital Course     Presenting Problem:   TIA (transient ischemic attack) [G45.9]    Active Hospital Problems    Diagnosis  POA   • Heart disease [I51.9]  Yes   • Chronic kidney disease [N18.9]  Yes   • Heart failure (HCC) [I50.9]  Yes   • Hypertension [I10]  Yes   • Osteoporosis [M81.0]  Yes   • Seizures (HCC) [R56.9]  Yes      Resolved Hospital Problems    Diagnosis Date Resolved POA   • **AMS (altered mental status) [R41.82] 10/17/2022 Yes   • TIA (transient ischemic attack) [G45.9] 10/17/2022 Yes   • Hypomagnesemia [E83.42] 10/17/2022 Yes          Hospital Course:  Gabriela Waller is a 61 y.o. female with a PMH significant for history of stroke with no residual deficits, CHF, CAD, seizures, HTN, CKD who comes to the ED due to difficulty speaking.  Patient admitted for further workup.     This patient's problems and plans were partially entered by my partner and updated as appropriate by me 10/17/22.     TIA  --stroke yury, neurology consulted and followed inpatient  --serial NIH/neurochecks while inpatient  --aspirin 324 given in ED  --continue aspirin 81mg oral daily  --lipid panel with elevated cholesterol, LDL and triglycerides- - Lipitor increased to 80mg PO daily with new script sent to pharmacy  - Hga1c: 5.7  - tsh stable  - TTE shows ejection fraction of 55% with a negative bubble study  - EEG: Focal cerebral dysfunction impacting the left hemisphere, mild. No ongoing seizures are seen.  --CT head, CTA head and neck, CT perfusion within normal  limits  --MRI brain negative  --PT/OT/SLP- evaluated. Recommended inpatient rehab on d/c but patient would like to go home with her daughter and refuses rehab.     Altered mental status- resolved  - Low-grade temperature, left shift on CBC on admission  -UA not consistent with UTI (nitrite neg, leuk est neg, no WBCs) but has 2+ bacteria.  It was reported to stroke team, pt had recent UTI and abx.  She denied abx but did c/o dysuria.  Will add urine cx and add rocephin for now.  - Chest x-ray without acute finding  - Lactic acid stable, procalcitonin stable, blood cultures negative to date     Seizures  - Keppra increased to 750 mg PO BID  - f/u with neurologist in 4-6 weeks     Hypomagnesemia- resolved  - Replaced per protocol     CHF  - TTE shows ejection fraction of 55% with a negative bubble study     HTN  - continue home antihypertensives    Discharge Follow Up Recommendations for outpatient labs/diagnostics:  - Lipitor increased to 80mg PO daily with new script sent to pharmacy  - Keppra increased to 750 mg PO BID  - f/u with neurologist in 4-6 weeks    Day of Discharge     HPI:   Patient resting in bed, feeling better. Wants to go home with daughter.    Review of Systems  Gen- No fevers, chills  CV- No chest pain, palpitations  Resp- No cough, dyspnea  GI- No N/V/D, abd pain    Vital Signs:   Temp:  [97.4 °F (36.3 °C)-98.3 °F (36.8 °C)] 97.6 °F (36.4 °C)  Heart Rate:  [49-96] 72  Resp:  [18-20] 20  BP: (127-155)/() 150/97      Physical Exam:  Constitutional: No acute distress, awake, alert  HENT: NCAT, mucous membranes moist  Respiratory: Clear to auscultation bilaterally, respiratory effort normal   Cardiovascular: RRR, no murmurs, rubs, or gallops  Gastrointestinal: Positive bowel sounds, soft, nontender, nondistended  Musculoskeletal: No bilateral ankle edema  Psychiatric: Appropriate affect, cooperative  Neurologic: Oriented x 3, strength symmetric in all extremities, Cranial Nerves grossly intact to  confrontation, speech clear  Skin: No rashes    Pertinent  and/or Most Recent Results     LAB RESULTS:      Lab 10/17/22  0558 10/16/22  0636 10/16/22  0031 10/15/22  2035 10/15/22  2008 10/15/22  2007   WBC 6.50 7.70  --  7.56  --   --    HEMOGLOBIN 13.4 14.7  --  13.2  --   --    HEMOGLOBIN, POC  --   --   --   --  15.3  --    HEMATOCRIT 40.7 43.5  --  38.6  --   --    HEMATOCRIT POC  --   --   --   --  45  --    PLATELETS 444 478*  --  444  --   --    NEUTROS ABS  --  5.37  --  6.08  --   --    IMMATURE GRANS (ABS)  --  0.03  --  0.01  --   --    LYMPHS ABS  --  1.55  --  0.90  --   --    MONOS ABS  --  0.60  --  0.47  --   --    EOS ABS  --  0.04  --  0.01  --   --    MCV 94.2 93.3  --  91.9  --   --    PROCALCITONIN  --   --   --  <0.02  --   --    LACTATE  --   --  0.8  --   --   --    PROTIME  --   --   --   --   --  12.7*         Lab 10/17/22  0558 10/16/22  0636 10/15/22  2035 10/15/22  2008   SODIUM 138 136 133*  --    POTASSIUM 4.0 3.6 3.9  --    CHLORIDE 104 99 97*  --    CO2 24.0 22.0 22.0  --    ANION GAP 10.0 15.0 14.0  --    BUN 10 7* 10  --    CREATININE 0.89 0.72 0.80 0.80   EGFR 73.0 94.1 82.9 82.9   GLUCOSE 81 67 71  --    CALCIUM 9.1 9.2 8.3*  --    MAGNESIUM  --  3.5* 1.7  --    HEMOGLOBIN A1C  --  5.70*  --   --    TSH  --  1.190  --   --          Lab 10/17/22  0558 10/15/22  2035   TOTAL PROTEIN 5.7* 6.3   ALBUMIN 3.80 4.00   GLOBULIN 1.9 2.3   ALT (SGPT) 5 6   AST (SGOT) 10 11   BILIRUBIN 0.4 0.4   ALK PHOS 81 87         Lab 10/15/22  2035 10/15/22  2007   TROPONIN T <0.010  --    PROTIME  --  12.7*   INR  --  1.1         Lab 10/16/22  0636   CHOLESTEROL 268*   LDL CHOL 144*   HDL CHOL 88*   TRIGLYCERIDES 205*             Brief Urine Lab Results  (Last result in the past 365 days)      Color   Clarity   Blood   Leuk Est   Nitrite   Protein   CREAT   Urine HCG        10/15/22 2045 Yellow   Cloudy   Negative   Negative   Negative   >=300 mg/dL (3+)               Microbiology Results (last 10  days)     Procedure Component Value - Date/Time    Blood Culture - Blood, Arm, Right [[id number]]  (Normal) Collected: 10/16/22 0031    Lab Status: Preliminary result Specimen: Blood from Arm, Right Updated: 10/17/22 0701     Blood Culture No growth at 24 hours    Blood Culture - Blood, Hand, Right [[id number]]  (Normal) Collected: 10/16/22 0027    Lab Status: Preliminary result Specimen: Blood from Hand, Right Updated: 10/17/22 0701     Blood Culture No growth at 24 hours    Urine Culture - Urine, Urine, Clean Catch [[id number]] Collected: 10/15/22 2045    Lab Status: Final result Specimen: Urine, Clean Catch Updated: 10/17/22 0920     Urine Culture 25,000 CFU/mL Normal Urogenital Gloria    Narrative:      Colonization of the urinary tract without infection is common. Treatment is discouraged unless the patient is symptomatic, pregnant, or undergoing an invasive urologic procedure.          Adult Transthoracic Echo Complete W/ Cont if Necessary Per Protocol (With Agitated Saline)    Result Date: 10/16/2022  •  Left ventricular systolic function is normal. Estimated left ventricular EF = 55%. •  Left ventricular diastolic function is consistent with (grade I) impaired relaxation. •  The cardiac valves are anatomically and functionally normal. •  Estimated right ventricular systolic pressure from tricuspid regurgitation is normal (<35 mmHg). •  Saline test results are negative.     EEG    Result Date: 10/17/2022  Reason for referral: 61 y.o.female with seizure-like activity Technical Summary:  A 19 channel digital EEG was performed using the international 10-20 placement system, including eye leads and EKG leads. Duration: 20 minutes Findings: The awake tracing shows diffuse low amplitude intermixed theta and alpha activity present symmetrically over both hemispheres.  There is an overlying of medium amplitude EMG artifact which somewhat obscures the underlying background.  At times an 11 Hz posterior rhythm is visible  over the occipital leads.  Photic stimulation yields a symmetric driving response.  As the study proceeds, intermittent slower 2 Hz delta seen over the left temporal head leads which is not present on the right.  No epileptiform activity is seen.  Stage II sleep is not seen.  No ongoing seizures are present. Video: On Technical quality: Superior EKG: Regular, 50 bpm SUMMARY: Intermittent left temporal slow No epileptiform activity or ongoing seizures are seen     Focal cerebral dysfunction impacting the left hemisphere, mild No ongoing seizures are seen This report is transcribed using the Dragon dictation system.      CT Angiogram Neck    Result Date: 10/15/2022  CTA HEAD WITH CONTRAST. CTA NECK WITH CONTRAST. CT PERFUSION BRAIN. DATE: 10/15/2022 6:07 PM  INDICATION:  Code stroke, left sided weakness, expressive aphasia.  COMPARISON: CT head from earlier today.  TECHNIQUE:   Thin section axial images through the head and neck after IV contrast. CT perfusion images through the brain. Two dimensional and three dimensional reformations. NASCET criteria used to determine stenosis.115 mL Isovue 370 given IV. Low-dose  CT acquisition technique included one of the following options: 1. Automated exposure control, 2. Adjustment of mA and/or KV according to patient's size and/or 3. Use of iterative reconstruction. FINDINGS:   CTA NECK: Aortic arch: Normal triple vessel anatomy. Atherosclerosis. Right common carotid artery: Normal origin. Calcified and noncalcified plaque distally. No significant stenosis, occlusion. Right internal carotid artery: Calcified plaque proximally less than 50% stenosis. Calcifications distally. No significant stenosis, occlusion. Right vertebral artery: Normal origin. Calcifications distally. No significant stenosis, occlusion. Left common carotid artery: Normal origin. Scattered calcified and noncalcified plaque. No significant stenosis, occlusion. Left internal carotid artery: Calcified and  noncalcified plaque proximally. Calcifications distally. No significant stenosis, occlusion. Left vertebral artery: Normal origin. Calcifications distally. No significant stenosis, occlusion. Parotid and submandibular glands are intact. Posterior nasopharynx, oropharynx, hypopharynx, larynx, thyroid gland, proximal trachea, imaged superior mediastinum  are patent. Lymph nodes in the neck do not meet the CT size criteria for lymphadenopathy. Degenerative changes of the spine. Centrilobular and paraseptal emphysema in the lung apices. CTA HEAD: No large aneurysm, significant stenosis or occlusion of the proximal major cerebral arteries. Major dural venous sinuses are patent. CT PERFUSION BRAIN: Cerebral blood flow, blood volume and mean transit time maps are symmetric without perfusion defect. CBF<30% vol: 0 mL Tmax>6s vol: 0 mL     1. Atherosclerosis without significant stenosis or occlusion of the major arteries of the head and neck. Basilar artery is patent. Likely artifact on earlier noncontrast head CT. 2. No perfusion defect.. Report called to stroke navigator lupe armando at 10/15/2022 8:52 PM Electronically signed by:  Palomo Doty M.D.  10/15/2022 6:54 PM Mountain Time    MRI Brain Without Contrast    Result Date: 10/15/2022  EXAMINATION: MRI BRAIN WITHOUT CONTRAST.  DATE: 10/15/2022 8:52 PM  INDICATION: Neuro deficit, stroke suspected. Altered mental status.  COMPARISON: Head CT October 15, 2022.  TECHNIQUE:  Sagittal T1, axial T2, FLAIR, diffusion, T1, STIR, and coronal T1 images through the brain. No contrast. FINDINGS:  Intracranial contents: Motion degraded study. Mild generalized volume loss. Focal regions of encephalomalacia in the medial left parietal lobe and occipital pole, as well as small chronic lacunar infarcts in the left inferior cerebellum. Patchy FLAIR hyperintensities in the periventricular white matter, most commonly related to chronic small vessel ischemic changes. There is no  dominant mass or mass effect. No hydrocephalus or convincing extra-axial fluid collection. Flow voids are patent within the proximal portions the major intracranial arteries on T2 imaging. Diffusion sequence: No restricted diffusion to suggest acute ischemia. Blood sensitive sequence: No abnormal intracranial blood products. Extracranial soft tissues: Trace bilateral mastoid effusions. Well aerated paranasal sinuses. No acute abnormality in the imaged portions the orbits or globes.     1. No acute intracranial abnormality. Specifically, negative for acute or subacute infarct. 2. Chronic infarcts in the medial left parietal lobe, left occipital, and left inferior cerebellum. 3. Volume loss and mild chronic small vessel ischemic changes. Electronically signed by:  Manny Liang M.D.  10/15/2022 9:34 PM Mountain Time    XR Chest 1 View    Result Date: 10/15/2022  FRONTAL VIEW OF THE CHEST CLINICAL INDICATION: Dizziness. COMPARISON: 9/13/2021. FINDINGS: No focal consolidation, pleural effusion or pneumothorax. Cardiomediastinal morphology is normal.     No acute cardiopulmonary abnormality. Electronically signed by:  Leland Patel M.D.  10/15/2022 7:15 PM Mountain Time    CT Head Without Contrast Stroke Protocol    Result Date: 10/15/2022  EXAMINATION: CT HEAD WITHOUT CONTRAST DATE: 10/15/2022 5:58 PM  INDICATION: Code stroke, expressive aphasia, left sided weakness, confusion.  COMPARISON: None.  TECHNIQUE:  Routine axial images through the head without contrast. Coronal reformations. Low-dose CT acquisition technique included one or more of the following options: 1. Automated exposure control, 2. Adjustment of mA and/or KV according to patient's size and/or 3. Use of iterative reconstruction. FINDINGS:  Intracranial contents: Ventricular and cisternal spaces are prominent from volume loss. Mild periventricular and subcortical white matter hypodensities. Well-defined hypodensities in the left cerebellum with the larger  focus measuring 5 x 10 mm. Hypodensity/encephalomalacia left parietal lobe medially measures 1.2 x 1.4 cm. Small area of encephalomalacia left occipital lobe inferiorly. No dominant mass, midline shift, hydrocephalus, extra axial fluid collection or acute hemorrhage. Distal basilar artery is slightly hyperdense.. Craniocervical junction is patent. Bones and extracranial soft tissues: Mild mucosal thickening ethmoid air cells, maxillary sinuses. Partial opacification right mastoid air cells greater than left. Otherwise, Visualized paranasal sinuses and mastoid air cells are clear.  Skull is intact. Visualized intraorbital contents are  unremarkable. Calcifications distal internal carotid arteries and distal vertebral arteries.     1. No acute intracranial hemorrhage. MRI is more sensitive for the detection of acute nonhemorrhagic infarct. 2. Questionable slight hyperdensity to the distal basilar artery. Artifact versus thrombus. Attention on CTA head. 3. Old infarcts in the left cerebellum, left parietal lobe medially and left occipital lobe inferiorly. MRI can best assess age and assess for any underlying acute infarct. Mild changes small vessel ischemic disease of indeterminate age, presumably mostly chronic. Volume loss. Atherosclerosis. 4. Trace mastoid effusions. Paranasal sinus disease. Report called to stroke navigator lupe armando at 10/15/2022 8:12 PM Electronically signed by:  Palomo Doty M.D.  10/15/2022 6:17 PM Mountain Time    CT Angiogram Head w AI Analysis of LVO    Result Date: 10/15/2022  CTA HEAD WITH CONTRAST. CTA NECK WITH CONTRAST. CT PERFUSION BRAIN. DATE: 10/15/2022 6:07 PM  INDICATION:  Code stroke, left sided weakness, expressive aphasia.  COMPARISON: CT head from earlier today.  TECHNIQUE:   Thin section axial images through the head and neck after IV contrast. CT perfusion images through the brain. Two dimensional and three dimensional reformations. NASCET criteria used to  determine stenosis.115 mL Isovue 370 given IV. Low-dose  CT acquisition technique included one of the following options: 1. Automated exposure control, 2. Adjustment of mA and/or KV according to patient's size and/or 3. Use of iterative reconstruction. FINDINGS:   CTA NECK: Aortic arch: Normal triple vessel anatomy. Atherosclerosis. Right common carotid artery: Normal origin. Calcified and noncalcified plaque distally. No significant stenosis, occlusion. Right internal carotid artery: Calcified plaque proximally less than 50% stenosis. Calcifications distally. No significant stenosis, occlusion. Right vertebral artery: Normal origin. Calcifications distally. No significant stenosis, occlusion. Left common carotid artery: Normal origin. Scattered calcified and noncalcified plaque. No significant stenosis, occlusion. Left internal carotid artery: Calcified and noncalcified plaque proximally. Calcifications distally. No significant stenosis, occlusion. Left vertebral artery: Normal origin. Calcifications distally. No significant stenosis, occlusion. Parotid and submandibular glands are intact. Posterior nasopharynx, oropharynx, hypopharynx, larynx, thyroid gland, proximal trachea, imaged superior mediastinum  are patent. Lymph nodes in the neck do not meet the CT size criteria for lymphadenopathy. Degenerative changes of the spine. Centrilobular and paraseptal emphysema in the lung apices. CTA HEAD: No large aneurysm, significant stenosis or occlusion of the proximal major cerebral arteries. Major dural venous sinuses are patent. CT PERFUSION BRAIN: Cerebral blood flow, blood volume and mean transit time maps are symmetric without perfusion defect. CBF<30% vol: 0 mL Tmax>6s vol: 0 mL     1. Atherosclerosis without significant stenosis or occlusion of the major arteries of the head and neck. Basilar artery is patent. Likely artifact on earlier noncontrast head CT. 2. No perfusion defect.. Report called to stroke  navigator lupe armando at 10/15/2022 8:52 PM Electronically signed by:  Palomo Doty M.D.  10/15/2022 6:54 PM Mountain Time    CT CEREBRAL PERFUSION WITH & WITHOUT CONTRAST    Result Date: 10/15/2022  CTA HEAD WITH CONTRAST. CTA NECK WITH CONTRAST. CT PERFUSION BRAIN. DATE: 10/15/2022 6:07 PM  INDICATION:  Code stroke, left sided weakness, expressive aphasia.  COMPARISON: CT head from earlier today.  TECHNIQUE:   Thin section axial images through the head and neck after IV contrast. CT perfusion images through the brain. Two dimensional and three dimensional reformations. NASCET criteria used to determine stenosis.115 mL Isovue 370 given IV. Low-dose  CT acquisition technique included one of the following options: 1. Automated exposure control, 2. Adjustment of mA and/or KV according to patient's size and/or 3. Use of iterative reconstruction. FINDINGS:   CTA NECK: Aortic arch: Normal triple vessel anatomy. Atherosclerosis. Right common carotid artery: Normal origin. Calcified and noncalcified plaque distally. No significant stenosis, occlusion. Right internal carotid artery: Calcified plaque proximally less than 50% stenosis. Calcifications distally. No significant stenosis, occlusion. Right vertebral artery: Normal origin. Calcifications distally. No significant stenosis, occlusion. Left common carotid artery: Normal origin. Scattered calcified and noncalcified plaque. No significant stenosis, occlusion. Left internal carotid artery: Calcified and noncalcified plaque proximally. Calcifications distally. No significant stenosis, occlusion. Left vertebral artery: Normal origin. Calcifications distally. No significant stenosis, occlusion. Parotid and submandibular glands are intact. Posterior nasopharynx, oropharynx, hypopharynx, larynx, thyroid gland, proximal trachea, imaged superior mediastinum  are patent. Lymph nodes in the neck do not meet the CT size criteria for lymphadenopathy. Degenerative changes  of the spine. Centrilobular and paraseptal emphysema in the lung apices. CTA HEAD: No large aneurysm, significant stenosis or occlusion of the proximal major cerebral arteries. Major dural venous sinuses are patent. CT PERFUSION BRAIN: Cerebral blood flow, blood volume and mean transit time maps are symmetric without perfusion defect. CBF<30% vol: 0 mL Tmax>6s vol: 0 mL     1. Atherosclerosis without significant stenosis or occlusion of the major arteries of the head and neck. Basilar artery is patent. Likely artifact on earlier noncontrast head CT. 2. No perfusion defect.. Report called to stroke navigator lupe armando at 10/15/2022 8:52 PM Electronically signed by:  Palomo Doty M.D.  10/15/2022 6:54 PM Mountain Time              Results for orders placed during the hospital encounter of 10/15/22    Adult Transthoracic Echo Complete W/ Cont if Necessary Per Protocol (With Agitated Saline)    Interpretation Summary  •  Left ventricular systolic function is normal. Estimated left ventricular EF = 55%.  •  Left ventricular diastolic function is consistent with (grade I) impaired relaxation.  •  The cardiac valves are anatomically and functionally normal.  •  Estimated right ventricular systolic pressure from tricuspid regurgitation is normal (<35 mmHg).  •  Saline test results are negative.      Plan for Follow-up of Pending Labs/Results: NGTD  Pending Labs     Order Current Status    Blood Culture - Blood, Arm, Right Preliminary result    Blood Culture - Blood, Hand, Right Preliminary result        Discharge Details        Discharge Medications      Changes to Medications      Instructions Start Date   atorvastatin 80 MG tablet  Commonly known as: LIPITOR  What changed:   · medication strength  · how much to take   80 mg, Oral, Nightly      levETIRAcetam 750 MG tablet  Commonly known as: KEPPRA  What changed:   · medication strength  · how much to take   750 mg, Oral, 2 Times Daily         Continue These  Medications      Instructions Start Date   amLODIPine 10 MG tablet  Commonly known as: NORVASC   10 mg, Oral, Daily      aspirin 81 MG EC tablet   81 mg, Oral, Daily      busPIRone 5 MG tablet  Commonly known as: BUSPAR   5 mg, Oral, 2 Times Daily      clopidogrel 75 MG tablet  Commonly known as: PLAVIX   75 mg, Oral, Daily      escitalopram 20 MG tablet  Commonly known as: LEXAPRO   20 mg, Oral, Daily      ezetimibe 10 MG tablet  Commonly known as: ZETIA   1 tablet, Oral, Daily      gabapentin 400 MG capsule  Commonly known as: NEURONTIN   400 mg, Oral, 3 Times Daily      hydrALAZINE 25 MG tablet  Commonly known as: APRESOLINE   25 mg, Oral, Daily PRN      HYDROcodone-acetaminophen 7.5-325 MG per tablet  Commonly known as: NORCO   No dose, route, or frequency recorded.      losartan 100 MG tablet  Commonly known as: COZAAR   100 mg, Oral, Daily      meclizine 25 MG tablet  Commonly known as: ANTIVERT   Oral, See Admin Instructions, Take 1 tablet by mouth every 6-8 hours as needed      meloxicam 15 MG tablet  Commonly known as: MOBIC   15 mg, Oral, Daily      metoprolol succinate  MG 24 hr tablet  Commonly known as: TOPROL-XL   1.5 tablets, Oral      omeprazole 20 MG capsule  Commonly known as: priLOSEC   20 mg, Oral, 2 Times Daily      potassium chloride 10 MEQ CR tablet   10 mEq, Oral, Daily      promethazine 12.5 MG tablet  Commonly known as: PHENERGAN   TAKE 1 TABLET BY MOUTH EVERY DAY AS NEEDED FORNAUSEA AND VOMITING      spironolactone 25 MG tablet  Commonly known as: ALDACTONE   25 mg, Oral, Daily      tiZANidine 4 MG tablet  Commonly known as: ZANAFLEX   No dose, route, or frequency recorded.      traZODone 50 MG tablet  Commonly known as: DESYREL   50 mg, Oral, Nightly      Vascepa 1 g capsule capsule  Generic drug: icosapent ethyl   2 capsules, Oral, 2 Times Daily      vitamin B-12 1000 MCG tablet  Commonly known as: CYANOCOBALAMIN   1,000 mcg, Oral, Daily             No Known  Allergies      Discharge Disposition:  Home or Self Care    Diet:  Hospital:  Diet Order   Procedures   • Diet Regular; Cardiac       Activity: as tolerated      Restrictions or Other Recommendations:  none       CODE STATUS:    Code Status and Medical Interventions:   Ordered at: 10/15/22 2229     Code Status (Patient has no pulse and is not breathing):    CPR (Attempt to Resuscitate)     Medical Interventions (Patient has pulse or is breathing):    Full Support       Additional Instructions for the Follow-ups that You Need to Schedule     Ambulatory Referral to Home Health   As directed      Face to Face Visit Date: 10/17/2022    Follow-up provider for Plan of Care?: I treated the patient in an acute care facility and will not continue treatment after discharge.    Follow-up provider: EMMANUEL WEBB [[id number]]    Reason/Clinical Findings: seizure    Describe mobility limitations that make leaving home difficult: impaired physical mobility and gait endurance    Nursing/Therapeutic Services Requested: Physical Therapy Occupational Therapy Speech Therapy    PT orders: Therapeutic exercise Gait Training Strengthening Home safety assessment    Weight Bearing Status: As Tolerated    Occupational orders: Activities of daily living Energy conservation Strengthening Home safety assessment    SLP orders: Articulation Language Cognition    Frequency: 1 Week 1         Discharge Follow-up with Specified Provider: neurologist in 4-6 weeks   As directed      To: neurologist in 4-6 weeks                     Jesika Fields DO  10/17/22      Time Spent on Discharge:  I spent  35  minutes on this discharge activity which included: face-to-face encounter with the patient, reviewing the data in the system, coordination of the care with the nursing staff as well as consultants, documentation, and entering orders.

## 2022-10-17 NOTE — CASE MANAGEMENT/SOCIAL WORK
Discharge Planning Assessment  [facility]     Patient Name: Gabriela Waller  MRN: [id number]  Today's Date: 10/17/2022    Admit Date: 10/15/2022    Plan: home with home health   Discharge Needs Assessment     Row Name 10/17/22 1121       Living Environment    People in Home child(sangita), adult    Current Living Arrangements home    Primary Care Provided by self    Family Caregiver if Needed child(sangita), adult    Able to Return to Prior Arrangements yes       Transition Planning    Patient/Family Anticipates Transition to home with family    Transportation Anticipated family or friend will provide       Discharge Needs Assessment    Readmission Within the Last 30 Days no previous admission in last 30 days    Equipment Currently Used at Home cane, straight;rollator    Concerns to be Addressed discharge planning    Discharge Facility/Level of Care Needs home with home health               Discharge Plan     Row Name 10/17/22 1121       Plan    Plan home with home health    Patient/Family in Agreement with Plan yes    Plan Comments I met with Mrs. Waller at the bedside. She lives in [facility] [place] with her daughter and son in law. At baseline she ambulates independently. She reports that she has a cane and rollator she uses as needed when she leaves her home. She is independent with all activities of daily living. Her son transports her by car when she leaves the home. PT/OT recommend that she go to inpatient rehab after this hospitalization. I discussed this with her. Mrs. Waller is not agreeable at this time. She prefers to return home with her daughter. She reports that her daughter is always home with her and can assist as needed. She is agreeable to home health services. I called a referral to [facility]. They will follow her with PT, OT and SLP services. She denies having any additional discharge needs.    Final Discharge Disposition Code 06 - home with home health care              Continued  Care and Services - Admitted Since 10/15/2022     Home Medical Care Coordination complete.    Service Provider Request Status Selected Services Address Phone Fax Patient Preferred    [facility]  Selected Home Rehabilitation [address], [address], [place] [address] [phone] -- --    [facility] Declined  Out of network N/A [address], [place] [id number] [phone] --    [facility] Declined N/A [address] [address] [phone] --              Expected Discharge Date and Time     Expected Discharge Date Expected Discharge Time    Oct 19, 2022          Demographic Summary     Row Name 10/17/22 1120       General Information    General Information Comments I confirmed that Margarita Lynch is Mrs. Waller' PCP. [facility] medicare is her insurer. She has secondary coverage through [facility].               Functional Status     Row Name 10/17/22 1121       Functional Status, IADL    Medications independent    Meal Preparation independent    Shopping assistive person               Psychosocial    No documentation.                Abuse/Neglect    No documentation.                Legal    No documentation.                Substance Abuse    No documentation.                Patient Forms    No documentation.                   Fran Jones RN

## 2022-10-18 NOTE — OUTREACH NOTE
Prep Survey    Flowsheet Row Responses   [facility] facility patient discharged from? [place]   Is LACE score < 7 ? No   Emergency Room discharge w/ pulse ox? No   Eligibility Readm Mgmt   Discharge diagnosis AMS, TIA, seizures   Does the patient have one of the following disease processes/diagnoses(primary or secondary)? Stroke   Does the patient have Home health ordered? Yes   What is the Home health agency?  [facility]   Is there a DME ordered? No   Prep survey completed? Yes          HERMANN RODGERS - Registered Nurse

## 2022-10-21 ENCOUNTER — READMISSION MANAGEMENT (OUTPATIENT)
Dept: CALL CENTER | Facility: HOSPITAL | Age: 62
End: 2022-10-21

## 2022-10-21 LAB
BACTERIA SPEC AEROBE CULT: NORMAL
BACTERIA SPEC AEROBE CULT: NORMAL

## 2022-10-21 NOTE — OUTREACH NOTE
Stroke Week 1 Survey    Flowsheet Row Responses   [facility] facility patient discharged from? [place]   Does the patient have one of the following disease processes/diagnoses(primary or secondary)? Stroke   Week 1 attempt successful? No   Unsuccessful attempts Attempt 1          ELLIE ABEBE - Registered Nurse

## 2022-10-26 ENCOUNTER — READMISSION MANAGEMENT (OUTPATIENT)
Dept: CALL CENTER | Facility: HOSPITAL | Age: 62
End: 2022-10-26

## 2022-10-26 NOTE — OUTREACH NOTE
Stroke Week 2 Survey    Flowsheet Row Responses   [facility] facility patient discharged from? [place]   Does the patient have one of the following disease processes/diagnoses(primary or secondary)? Stroke   Week 2 attempt successful? No   Unsuccessful attempts Attempt 1          LYNDA DIAMOND - Registered Nurse

## 2022-11-01 ENCOUNTER — READMISSION MANAGEMENT (OUTPATIENT)
Dept: CALL CENTER | Facility: HOSPITAL | Age: 62
End: 2022-11-01

## 2022-11-01 NOTE — OUTREACH NOTE
Stroke Week 3 Survey    Flowsheet Row Responses   [facility] patient discharged from? [place]   Does the patient have one of the following disease processes/diagnoses(primary or secondary)? Stroke   Week 3 attempt successful? Yes   Call start time 1739   Call end time 1747   Discharge diagnosis AMS, TIA, seizures   Meds reviewed with patient/caregiver? Yes   Is the patient having any side effects they believe may be caused by any medication additions or changes? Yes   Does the patient have all medications ordered at discharge? N/A   Is the patient taking all medications as directed (includes completed medication regime)? Yes   Does the patient have a primary care provider?  Yes   Has the patient kept scheduled appointments due by today? N/A   [facility] the [facility]   Has home health visited the patient within 72 hours of discharge? Yes   Psychosocial issues? No   Does the patient require any assistance with activities of daily living such as eating, bathing, dressing, walking, etc.? Yes   Does the patient have any residual symptoms from stroke/TIA? Yes   What is the patient's perception of their health status since discharge? Improving   Is the patient able to teach back FAST for Stroke? S peech: Listen for slurred speech, T armando: Call 9-1-1 right away, A rm: Check if one arm is weak, F ace: Look for an uneven smile, E yes: Check for vision loss, B alance: Watch for sudden loss of balance   Is the patient/caregiver able to teach back the risk factors for a stroke? High blood pressure-goal below 120/80, Smoking, High Cholesterol, Physical inactivity and obesity, History of TIAs, Sleep apnea   Is the patient/caregiver able to teach back signs and symptoms related to disease process for when to call PCP? Yes   Is the patient/caregiver able to teach back signs and symptoms related to disease process for when to call 911? Yes   If the patient is a current smoker, are they able to  teach back resources for cessation? Smoking cessation medications, [id number]   Is the patient/caregiver able to teach back the hierarchy of who to call/visit for symptoms/problems? PCP, Specialist, Home health nurse, Urgent Care, ED, 911 Yes   Additional teach back comments States therapy told her she has improved and they didn't need to come.  She states she has appt with Neuro in Jan and that is the soonest appt.     Week 3 call completed? Yes   Revoked No further contact(revokes)-requires comment   Graduated/Revoked comments Denies questions or needs at this time.          JOSE MACHUCA - Licensed Nurse

## 2023-03-16 ENCOUNTER — APPOINTMENT (OUTPATIENT)
Dept: GENERAL RADIOLOGY | Facility: HOSPITAL | Age: 63
DRG: 872 | End: 2023-03-16
Payer: MEDICARE

## 2023-03-16 ENCOUNTER — HOSPITAL ENCOUNTER (INPATIENT)
Facility: HOSPITAL | Age: 63
LOS: 4 days | Discharge: HOME | DRG: 872 | End: 2023-03-20
Attending: EMERGENCY MEDICINE | Admitting: INTERNAL MEDICINE
Payer: MEDICARE

## 2023-03-16 DIAGNOSIS — E86.0 DEHYDRATION: ICD-10-CM

## 2023-03-16 DIAGNOSIS — R19.7 DIARRHEA, UNSPECIFIED TYPE: ICD-10-CM

## 2023-03-16 DIAGNOSIS — I95.9 HYPOTENSION, UNSPECIFIED HYPOTENSION TYPE: ICD-10-CM

## 2023-03-16 DIAGNOSIS — A41.9 SEPSIS WITHOUT ACUTE ORGAN DYSFUNCTION, DUE TO UNSPECIFIED ORGANISM: Primary | ICD-10-CM

## 2023-03-16 PROBLEM — E78.5 HYPERLIPIDEMIA: Status: ACTIVE | Noted: 2023-03-16

## 2023-03-16 PROBLEM — G47.30 SLEEP APNEA: Status: ACTIVE | Noted: 2023-03-16

## 2023-03-16 PROBLEM — J44.9 COPD (CHRONIC OBSTRUCTIVE PULMONARY DISEASE): Status: ACTIVE | Noted: 2023-03-16

## 2023-03-16 LAB
ALBUMIN SERPL-MCNC: 3.6 G/DL (ref 3.5–5.2)
ALBUMIN/GLOB SERPL: 1.5 G/DL
ALP SERPL-CCNC: 77 U/L (ref 39–117)
ALT SERPL W P-5'-P-CCNC: 9 U/L (ref 1–33)
AMORPH URATE CRY URNS QL MICRO: ABNORMAL /HPF
ANION GAP SERPL CALCULATED.3IONS-SCNC: 16 MMOL/L (ref 5–15)
APTT PPP: 26.8 SECONDS (ref 22–39)
AST SERPL-CCNC: 18 U/L (ref 1–32)
ATMOSPHERIC PRESS: ABNORMAL MM[HG]
B PARAPERT DNA SPEC QL NAA+PROBE: NOT DETECTED
B PERT DNA SPEC QL NAA+PROBE: NOT DETECTED
BACTERIA UR QL AUTO: ABNORMAL /HPF
BASE EXCESS BLDV CALC-SCNC: 1.2 MMOL/L (ref -2–2)
BASOPHILS # BLD AUTO: 0.05 10*3/MM3 (ref 0–0.2)
BASOPHILS NFR BLD AUTO: 0.3 % (ref 0–1.5)
BDY SITE: ABNORMAL
BILIRUB SERPL-MCNC: 0.4 MG/DL (ref 0–1.2)
BILIRUB UR QL STRIP: NEGATIVE
BODY TEMPERATURE: 37 C
BUN SERPL-MCNC: 23 MG/DL (ref 8–23)
BUN/CREAT SERPL: 9.4 (ref 7–25)
C PNEUM DNA NPH QL NAA+NON-PROBE: NOT DETECTED
CA-I SERPL ISE-MCNC: 1.17 MMOL/L (ref 1.12–1.32)
CALCIUM SPEC-SCNC: 8.5 MG/DL (ref 8.6–10.5)
CHLORIDE SERPL-SCNC: 89 MMOL/L (ref 98–107)
CLARITY UR: ABNORMAL
CO2 BLDA-SCNC: 28.2 MMOL/L (ref 22–33)
CO2 SERPL-SCNC: 27 MMOL/L (ref 22–29)
COHGB MFR BLD: 2.3 %
COLOR UR: ABNORMAL
CREAT SERPL-MCNC: 2.44 MG/DL (ref 0.57–1)
D-LACTATE SERPL-SCNC: 2.5 MMOL/L (ref 0.5–2)
DEPRECATED RDW RBC AUTO: 47.4 FL (ref 37–54)
EGFRCR SERPLBLD CKD-EPI 2021: 21.6 ML/MIN/1.73
EOSINOPHIL # BLD AUTO: 0.02 10*3/MM3 (ref 0–0.4)
EOSINOPHIL NFR BLD AUTO: 0.1 % (ref 0.3–6.2)
EPAP: 0
ERYTHROCYTE [DISTWIDTH] IN BLOOD BY AUTOMATED COUNT: 13.6 % (ref 12.3–15.4)
FLUAV SUBTYP SPEC NAA+PROBE: NOT DETECTED
FLUBV RNA ISLT QL NAA+PROBE: NOT DETECTED
GLOBULIN UR ELPH-MCNC: 2.4 GM/DL
GLUCOSE SERPL-MCNC: 114 MG/DL (ref 65–99)
GLUCOSE UR STRIP-MCNC: ABNORMAL MG/DL
HADV DNA SPEC NAA+PROBE: NOT DETECTED
HCO3 BLDV-SCNC: 26.8 MMOL/L (ref 22–28)
HCOV 229E RNA SPEC QL NAA+PROBE: NOT DETECTED
HCOV HKU1 RNA SPEC QL NAA+PROBE: NOT DETECTED
HCOV NL63 RNA SPEC QL NAA+PROBE: NOT DETECTED
HCOV OC43 RNA SPEC QL NAA+PROBE: NOT DETECTED
HCT VFR BLD AUTO: 38.9 % (ref 34–46.6)
HGB BLD-MCNC: 13.1 G/DL (ref 12–15.9)
HGB BLDA-MCNC: 12.2 G/DL (ref 14–18)
HGB UR QL STRIP.AUTO: ABNORMAL
HMPV RNA NPH QL NAA+NON-PROBE: NOT DETECTED
HOLD SPECIMEN: NORMAL
HOLD SPECIMEN: NORMAL
HPIV1 RNA ISLT QL NAA+PROBE: NOT DETECTED
HPIV2 RNA SPEC QL NAA+PROBE: NOT DETECTED
HPIV3 RNA NPH QL NAA+PROBE: NOT DETECTED
HPIV4 P GENE NPH QL NAA+PROBE: NOT DETECTED
HYALINE CASTS UR QL AUTO: ABNORMAL /LPF
IMM GRANULOCYTES # BLD AUTO: 0.1 10*3/MM3 (ref 0–0.05)
IMM GRANULOCYTES NFR BLD AUTO: 0.6 % (ref 0–0.5)
INHALED O2 CONCENTRATION: 21 %
INR PPP: 1.03 (ref 0.84–1.13)
IPAP: 0
KETONES UR QL STRIP: NEGATIVE
LEUKOCYTE ESTERASE UR QL STRIP.AUTO: NEGATIVE
LYMPHOCYTES # BLD AUTO: 2.58 10*3/MM3 (ref 0.7–3.1)
LYMPHOCYTES NFR BLD AUTO: 15.9 % (ref 19.6–45.3)
M PNEUMO IGG SER IA-ACNC: NOT DETECTED
MAGNESIUM SERPL-MCNC: 1.6 MG/DL (ref 1.6–2.4)
MCH RBC QN AUTO: 31.9 PG (ref 26.6–33)
MCHC RBC AUTO-ENTMCNC: 33.7 G/DL (ref 31.5–35.7)
MCV RBC AUTO: 94.6 FL (ref 79–97)
METHGB BLD QL: 0.3 %
MODALITY: ABNORMAL
MONOCYTES # BLD AUTO: 1.78 10*3/MM3 (ref 0.1–0.9)
MONOCYTES NFR BLD AUTO: 11 % (ref 5–12)
NEUTROPHILS NFR BLD AUTO: 11.71 10*3/MM3 (ref 1.7–7)
NEUTROPHILS NFR BLD AUTO: 72.1 % (ref 42.7–76)
NITRITE UR QL STRIP: NEGATIVE
NOTE: ABNORMAL
NRBC BLD AUTO-RTO: 0 /100 WBC (ref 0–0.2)
OXYHGB MFR BLDV: 29.5 %
PAW @ PEAK INSP FLOW SETTING VENT: 0 CMH2O
PCO2 BLDV: 45.9 MM HG (ref 41–51)
PH BLDV: 7.38 PH UNITS (ref 7.31–7.41)
PH UR STRIP.AUTO: 5.5 [PH] (ref 5–8)
PLATELET # BLD AUTO: 492 10*3/MM3 (ref 140–450)
PMV BLD AUTO: 8.9 FL (ref 6–12)
PO2 BLDV: 18.2 MM HG (ref 27–53)
POTASSIUM SERPL-SCNC: 3.4 MMOL/L (ref 3.5–5.2)
PROCALCITONIN SERPL-MCNC: 0.33 NG/ML (ref 0–0.25)
PROT SERPL-MCNC: 6 G/DL (ref 6–8.5)
PROT UR QL STRIP: ABNORMAL
PROTHROMBIN TIME: 13.4 SECONDS (ref 11.4–14.4)
RBC # BLD AUTO: 4.11 10*6/MM3 (ref 3.77–5.28)
RBC # UR STRIP: ABNORMAL /HPF
REF LAB TEST METHOD: ABNORMAL
RHINOVIRUS RNA SPEC NAA+PROBE: NOT DETECTED
RSV RNA NPH QL NAA+NON-PROBE: NOT DETECTED
SARS-COV-2 RNA NPH QL NAA+NON-PROBE: NOT DETECTED
SODIUM SERPL-SCNC: 132 MMOL/L (ref 136–145)
SP GR UR STRIP: 1.02 (ref 1–1.03)
SQUAMOUS #/AREA URNS HPF: ABNORMAL /HPF
TOTAL RATE: 0 BREATHS/MINUTE
TROPONIN T SERPL HS-MCNC: 39 NG/L
TROPONIN T SERPL HS-MCNC: 39 NG/L
UROBILINOGEN UR QL STRIP: ABNORMAL
WBC # UR STRIP: ABNORMAL /HPF
WBC NRBC COR # BLD: 16.24 10*3/MM3 (ref 3.4–10.8)
WHOLE BLOOD HOLD COAG: NORMAL
WHOLE BLOOD HOLD SPECIMEN: NORMAL

## 2023-03-16 PROCEDURE — 80053 COMPREHEN METABOLIC PANEL: CPT | Performed by: EMERGENCY MEDICINE

## 2023-03-16 PROCEDURE — 85610 PROTHROMBIN TIME: CPT | Performed by: EMERGENCY MEDICINE

## 2023-03-16 PROCEDURE — 99285 EMERGENCY DEPT VISIT HI MDM: CPT

## 2023-03-16 PROCEDURE — 71045 X-RAY EXAM CHEST 1 VIEW: CPT

## 2023-03-16 PROCEDURE — 93005 ELECTROCARDIOGRAM TRACING: CPT | Performed by: EMERGENCY MEDICINE

## 2023-03-16 PROCEDURE — 83735 ASSAY OF MAGNESIUM: CPT | Performed by: EMERGENCY MEDICINE

## 2023-03-16 PROCEDURE — 0202U NFCT DS 22 TRGT SARS-COV-2: CPT | Performed by: EMERGENCY MEDICINE

## 2023-03-16 PROCEDURE — 84484 ASSAY OF TROPONIN QUANT: CPT | Performed by: EMERGENCY MEDICINE

## 2023-03-16 PROCEDURE — 83605 ASSAY OF LACTIC ACID: CPT | Performed by: EMERGENCY MEDICINE

## 2023-03-16 PROCEDURE — 85730 THROMBOPLASTIN TIME PARTIAL: CPT | Performed by: EMERGENCY MEDICINE

## 2023-03-16 PROCEDURE — P9612 CATHETERIZE FOR URINE SPEC: HCPCS

## 2023-03-16 PROCEDURE — 82805 BLOOD GASES W/O2 SATURATION: CPT

## 2023-03-16 PROCEDURE — 25010000002 VANCOMYCIN 10 G RECONSTITUTED SOLUTION: Performed by: EMERGENCY MEDICINE

## 2023-03-16 PROCEDURE — 85025 COMPLETE CBC W/AUTO DIFF WBC: CPT | Performed by: EMERGENCY MEDICINE

## 2023-03-16 PROCEDURE — 81001 URINALYSIS AUTO W/SCOPE: CPT | Performed by: EMERGENCY MEDICINE

## 2023-03-16 PROCEDURE — 36415 COLL VENOUS BLD VENIPUNCTURE: CPT

## 2023-03-16 PROCEDURE — 87040 BLOOD CULTURE FOR BACTERIA: CPT | Performed by: EMERGENCY MEDICINE

## 2023-03-16 PROCEDURE — 25010000002 PIPERACILLIN SOD-TAZOBACTAM PER 1 G: Performed by: EMERGENCY MEDICINE

## 2023-03-16 PROCEDURE — 99223 1ST HOSP IP/OBS HIGH 75: CPT | Performed by: INTERNAL MEDICINE

## 2023-03-16 PROCEDURE — 82330 ASSAY OF CALCIUM: CPT | Performed by: EMERGENCY MEDICINE

## 2023-03-16 PROCEDURE — 84145 PROCALCITONIN (PCT): CPT | Performed by: EMERGENCY MEDICINE

## 2023-03-16 RX ORDER — SODIUM CHLORIDE 0.9 % (FLUSH) 0.9 %
10 SYRINGE (ML) INJECTION AS NEEDED
Status: DISCONTINUED | OUTPATIENT
Start: 2023-03-16 | End: 2023-03-20 | Stop reason: HOSPADM

## 2023-03-16 RX ADMIN — SODIUM CHLORIDE 1701 ML: 9 INJECTION, SOLUTION INTRAVENOUS at 21:52

## 2023-03-16 RX ADMIN — TAZOBACTAM SODIUM AND PIPERACILLIN SODIUM 3.38 G: 375; 3 INJECTION, SOLUTION INTRAVENOUS at 21:52

## 2023-03-16 RX ADMIN — VANCOMYCIN HYDROCHLORIDE 1250 MG: 10 INJECTION, POWDER, LYOPHILIZED, FOR SOLUTION INTRAVENOUS at 22:35

## 2023-03-16 RX ADMIN — SODIUM CHLORIDE 1000 ML: 9 INJECTION, SOLUTION INTRAVENOUS at 21:16

## 2023-03-17 PROBLEM — I95.2 HYPOTENSION DUE TO DRUGS: Status: ACTIVE | Noted: 2023-03-17

## 2023-03-17 LAB
AMPHET+METHAMPHET UR QL: NEGATIVE
AMPHETAMINES UR QL: NEGATIVE
ANION GAP SERPL CALCULATED.3IONS-SCNC: 11 MMOL/L (ref 5–15)
BARBITURATES UR QL SCN: NEGATIVE
BASOPHILS # BLD AUTO: 0.04 10*3/MM3 (ref 0–0.2)
BASOPHILS NFR BLD AUTO: 0.3 % (ref 0–1.5)
BENZODIAZ UR QL SCN: NEGATIVE
BUN SERPL-MCNC: 23 MG/DL (ref 8–23)
BUN/CREAT SERPL: 9.8 (ref 7–25)
BUPRENORPHINE SERPL-MCNC: NEGATIVE NG/ML
CALCIUM SPEC-SCNC: 7.3 MG/DL (ref 8.6–10.5)
CANNABINOIDS SERPL QL: NEGATIVE
CHLORIDE SERPL-SCNC: 101 MMOL/L (ref 98–107)
CO2 SERPL-SCNC: 24 MMOL/L (ref 22–29)
COCAINE UR QL: NEGATIVE
CORTIS SERPL-MCNC: 13.99 MCG/DL
CREAT SERPL-MCNC: 2.35 MG/DL (ref 0.57–1)
D-LACTATE SERPL-SCNC: 1.6 MMOL/L (ref 0.5–2)
DEPRECATED RDW RBC AUTO: 49.1 FL (ref 37–54)
EGFRCR SERPLBLD CKD-EPI 2021: 22.6 ML/MIN/1.73
EOSINOPHIL # BLD AUTO: 0.01 10*3/MM3 (ref 0–0.4)
EOSINOPHIL NFR BLD AUTO: 0.1 % (ref 0.3–6.2)
ERYTHROCYTE [DISTWIDTH] IN BLOOD BY AUTOMATED COUNT: 13.7 % (ref 12.3–15.4)
ETHANOL BLD-MCNC: <10 MG/DL (ref 0–10)
GEN 5 2HR TROPONIN T REFLEX: 34 NG/L
GLUCOSE SERPL-MCNC: 94 MG/DL (ref 65–99)
HCT VFR BLD AUTO: 32.8 % (ref 34–46.6)
HGB BLD-MCNC: 10.8 G/DL (ref 12–15.9)
HOLD SPECIMEN: NORMAL
IMM GRANULOCYTES # BLD AUTO: 0.05 10*3/MM3 (ref 0–0.05)
IMM GRANULOCYTES NFR BLD AUTO: 0.4 % (ref 0–0.5)
LYMPHOCYTES # BLD AUTO: 2.61 10*3/MM3 (ref 0.7–3.1)
LYMPHOCYTES NFR BLD AUTO: 22.5 % (ref 19.6–45.3)
MAGNESIUM SERPL-MCNC: 2.2 MG/DL (ref 1.6–2.4)
MCH RBC QN AUTO: 32 PG (ref 26.6–33)
MCHC RBC AUTO-ENTMCNC: 32.9 G/DL (ref 31.5–35.7)
MCV RBC AUTO: 97 FL (ref 79–97)
METHADONE UR QL SCN: NEGATIVE
MONOCYTES # BLD AUTO: 1.14 10*3/MM3 (ref 0.1–0.9)
MONOCYTES NFR BLD AUTO: 9.8 % (ref 5–12)
NEUTROPHILS NFR BLD AUTO: 66.9 % (ref 42.7–76)
NEUTROPHILS NFR BLD AUTO: 7.73 10*3/MM3 (ref 1.7–7)
NRBC BLD AUTO-RTO: 0 /100 WBC (ref 0–0.2)
OPIATES UR QL: NEGATIVE
OXYCODONE UR QL SCN: NEGATIVE
PCP UR QL SCN: NEGATIVE
PHOSPHATE SERPL-MCNC: 4.2 MG/DL (ref 2.5–4.5)
PLATELET # BLD AUTO: 403 10*3/MM3 (ref 140–450)
PMV BLD AUTO: 9 FL (ref 6–12)
POTASSIUM SERPL-SCNC: 2.8 MMOL/L (ref 3.5–5.2)
POTASSIUM SERPL-SCNC: 3.3 MMOL/L (ref 3.5–5.2)
PROPOXYPH UR QL: NEGATIVE
RBC # BLD AUTO: 3.38 10*6/MM3 (ref 3.77–5.28)
SODIUM SERPL-SCNC: 136 MMOL/L (ref 136–145)
TRICYCLICS UR QL SCN: NEGATIVE
TROPONIN T DELTA: -5 NG/L
TSH SERPL DL<=0.05 MIU/L-ACNC: 1.22 UIU/ML (ref 0.27–4.2)
WBC NRBC COR # BLD: 11.58 10*3/MM3 (ref 3.4–10.8)

## 2023-03-17 PROCEDURE — 99233 SBSQ HOSP IP/OBS HIGH 50: CPT | Performed by: INTERNAL MEDICINE

## 2023-03-17 PROCEDURE — 82533 TOTAL CORTISOL: CPT | Performed by: NURSE PRACTITIONER

## 2023-03-17 PROCEDURE — 25010000002 MAGNESIUM SULFATE 2 GM/50ML SOLUTION: Performed by: INTERNAL MEDICINE

## 2023-03-17 PROCEDURE — 80306 DRUG TEST PRSMV INSTRMNT: CPT | Performed by: NURSE PRACTITIONER

## 2023-03-17 PROCEDURE — 83735 ASSAY OF MAGNESIUM: CPT | Performed by: INTERNAL MEDICINE

## 2023-03-17 PROCEDURE — 82077 ASSAY SPEC XCP UR&BREATH IA: CPT | Performed by: NURSE PRACTITIONER

## 2023-03-17 PROCEDURE — P9041 ALBUMIN (HUMAN),5%, 50ML: HCPCS | Performed by: NURSE PRACTITIONER

## 2023-03-17 PROCEDURE — 80048 BASIC METABOLIC PNL TOTAL CA: CPT | Performed by: INTERNAL MEDICINE

## 2023-03-17 PROCEDURE — 25010000002 ALBUMIN HUMAN 5% PER 50 ML: Performed by: NURSE PRACTITIONER

## 2023-03-17 PROCEDURE — 84443 ASSAY THYROID STIM HORMONE: CPT | Performed by: NURSE PRACTITIONER

## 2023-03-17 PROCEDURE — 85025 COMPLETE CBC W/AUTO DIFF WBC: CPT | Performed by: INTERNAL MEDICINE

## 2023-03-17 PROCEDURE — 84132 ASSAY OF SERUM POTASSIUM: CPT | Performed by: NURSE PRACTITIONER

## 2023-03-17 PROCEDURE — 25010000002 HEPARIN (PORCINE) PER 1000 UNITS: Performed by: INTERNAL MEDICINE

## 2023-03-17 PROCEDURE — 25010000002 PIPERACILLIN SOD-TAZOBACTAM PER 1 G: Performed by: INTERNAL MEDICINE

## 2023-03-17 PROCEDURE — 84100 ASSAY OF PHOSPHORUS: CPT | Performed by: INTERNAL MEDICINE

## 2023-03-17 RX ORDER — AMLODIPINE BESYLATE 10 MG/1
10 TABLET ORAL
Status: DISCONTINUED | OUTPATIENT
Start: 2023-03-17 | End: 2023-03-17

## 2023-03-17 RX ORDER — NICOTINE 21 MG/24HR
1 PATCH, TRANSDERMAL 24 HOURS TRANSDERMAL
Status: DISCONTINUED | OUTPATIENT
Start: 2023-03-17 | End: 2023-03-20 | Stop reason: HOSPADM

## 2023-03-17 RX ORDER — AMLODIPINE BESYLATE 5 MG/1
5 TABLET ORAL
Status: DISCONTINUED | OUTPATIENT
Start: 2023-03-17 | End: 2023-03-19

## 2023-03-17 RX ORDER — ALBUMIN, HUMAN INJ 5% 5 %
500 SOLUTION INTRAVENOUS ONCE
Status: COMPLETED | OUTPATIENT
Start: 2023-03-17 | End: 2023-03-17

## 2023-03-17 RX ORDER — MAGNESIUM SULFATE HEPTAHYDRATE 40 MG/ML
2 INJECTION, SOLUTION INTRAVENOUS ONCE
Status: COMPLETED | OUTPATIENT
Start: 2023-03-17 | End: 2023-03-17

## 2023-03-17 RX ORDER — HEPARIN SODIUM 5000 [USP'U]/ML
5000 INJECTION, SOLUTION INTRAVENOUS; SUBCUTANEOUS EVERY 8 HOURS SCHEDULED
Status: DISCONTINUED | OUTPATIENT
Start: 2023-03-17 | End: 2023-03-20 | Stop reason: HOSPADM

## 2023-03-17 RX ORDER — NOREPINEPHRINE BIT/0.9 % NACL 8 MG/250ML
.02-.3 INFUSION BOTTLE (ML) INTRAVENOUS
Status: DISCONTINUED | OUTPATIENT
Start: 2023-03-17 | End: 2023-03-17

## 2023-03-17 RX ORDER — PANTOPRAZOLE SODIUM 40 MG/1
40 TABLET, DELAYED RELEASE ORAL
Status: DISCONTINUED | OUTPATIENT
Start: 2023-03-17 | End: 2023-03-20 | Stop reason: HOSPADM

## 2023-03-17 RX ORDER — POTASSIUM CHLORIDE 750 MG/1
10 CAPSULE, EXTENDED RELEASE ORAL DAILY
Status: DISCONTINUED | OUTPATIENT
Start: 2023-03-17 | End: 2023-03-20 | Stop reason: HOSPADM

## 2023-03-17 RX ORDER — SODIUM CHLORIDE 9 MG/ML
100 INJECTION, SOLUTION INTRAVENOUS CONTINUOUS
Status: DISCONTINUED | OUTPATIENT
Start: 2023-03-17 | End: 2023-03-19

## 2023-03-17 RX ORDER — ESCITALOPRAM OXALATE 20 MG/1
20 TABLET ORAL DAILY
Status: DISCONTINUED | OUTPATIENT
Start: 2023-03-17 | End: 2023-03-20 | Stop reason: HOSPADM

## 2023-03-17 RX ORDER — POTASSIUM CHLORIDE 750 MG/1
40 CAPSULE, EXTENDED RELEASE ORAL ONCE
Status: COMPLETED | OUTPATIENT
Start: 2023-03-17 | End: 2023-03-17

## 2023-03-17 RX ORDER — LEVETIRACETAM 500 MG/1
500 TABLET ORAL 2 TIMES DAILY
Status: DISCONTINUED | OUTPATIENT
Start: 2023-03-17 | End: 2023-03-20 | Stop reason: HOSPADM

## 2023-03-17 RX ORDER — CLOPIDOGREL BISULFATE 75 MG/1
75 TABLET ORAL DAILY
Status: DISCONTINUED | OUTPATIENT
Start: 2023-03-17 | End: 2023-03-20 | Stop reason: HOSPADM

## 2023-03-17 RX ORDER — LEVETIRACETAM 750 MG/1
750 TABLET ORAL 2 TIMES DAILY
Status: DISCONTINUED | OUTPATIENT
Start: 2023-03-17 | End: 2023-03-17

## 2023-03-17 RX ORDER — ASPIRIN 81 MG/1
81 TABLET ORAL DAILY
Status: DISCONTINUED | OUTPATIENT
Start: 2023-03-17 | End: 2023-03-20 | Stop reason: HOSPADM

## 2023-03-17 RX ORDER — ATORVASTATIN CALCIUM 40 MG/1
80 TABLET, FILM COATED ORAL NIGHTLY
Status: DISCONTINUED | OUTPATIENT
Start: 2023-03-17 | End: 2023-03-20 | Stop reason: HOSPADM

## 2023-03-17 RX ORDER — HYDROCODONE BITARTRATE AND ACETAMINOPHEN 5; 325 MG/1; MG/1
1 TABLET ORAL EVERY 6 HOURS PRN
Status: DISCONTINUED | OUTPATIENT
Start: 2023-03-17 | End: 2023-03-20 | Stop reason: HOSPADM

## 2023-03-17 RX ORDER — AMLODIPINE BESYLATE 5 MG/1
5 TABLET ORAL
Status: DISCONTINUED | OUTPATIENT
Start: 2023-03-18 | End: 2023-03-17

## 2023-03-17 RX ORDER — BUSPIRONE HYDROCHLORIDE 10 MG/1
5 TABLET ORAL 2 TIMES DAILY
Status: DISCONTINUED | OUTPATIENT
Start: 2023-03-17 | End: 2023-03-20 | Stop reason: HOSPADM

## 2023-03-17 RX ORDER — GABAPENTIN 100 MG/1
200 CAPSULE ORAL 3 TIMES DAILY
Status: DISCONTINUED | OUTPATIENT
Start: 2023-03-17 | End: 2023-03-20 | Stop reason: HOSPADM

## 2023-03-17 RX ADMIN — POTASSIUM CHLORIDE 40 MEQ: 10 CAPSULE, COATED, EXTENDED RELEASE ORAL at 05:33

## 2023-03-17 RX ADMIN — CLOPIDOGREL BISULFATE 75 MG: 75 TABLET ORAL at 08:01

## 2023-03-17 RX ADMIN — BUSPIRONE HYDROCHLORIDE 5 MG: 10 TABLET ORAL at 01:03

## 2023-03-17 RX ADMIN — ASPIRIN 81 MG: 81 TABLET, COATED ORAL at 08:02

## 2023-03-17 RX ADMIN — BUSPIRONE HYDROCHLORIDE 5 MG: 10 TABLET ORAL at 08:07

## 2023-03-17 RX ADMIN — GABAPENTIN 200 MG: 100 CAPSULE ORAL at 08:02

## 2023-03-17 RX ADMIN — HEPARIN SODIUM 5000 UNITS: 5000 INJECTION INTRAVENOUS; SUBCUTANEOUS at 14:11

## 2023-03-17 RX ADMIN — GABAPENTIN 200 MG: 100 CAPSULE ORAL at 20:57

## 2023-03-17 RX ADMIN — Medication 10 ML: at 08:02

## 2023-03-17 RX ADMIN — MAGNESIUM SULFATE HEPTAHYDRATE 2 G: 2 INJECTION, SOLUTION INTRAVENOUS at 01:03

## 2023-03-17 RX ADMIN — ALBUMIN (HUMAN) 500 ML: 12.5 INJECTION, SOLUTION INTRAVENOUS at 02:57

## 2023-03-17 RX ADMIN — TAZOBACTAM SODIUM AND PIPERACILLIN SODIUM 3.38 G: 375; 3 INJECTION, SOLUTION INTRAVENOUS at 05:33

## 2023-03-17 RX ADMIN — POTASSIUM CHLORIDE 10 MEQ: 10 CAPSULE, COATED, EXTENDED RELEASE ORAL at 08:01

## 2023-03-17 RX ADMIN — HEPARIN SODIUM 5000 UNITS: 5000 INJECTION INTRAVENOUS; SUBCUTANEOUS at 05:33

## 2023-03-17 RX ADMIN — SODIUM CHLORIDE 100 ML/HR: 9 INJECTION, SOLUTION INTRAVENOUS at 11:27

## 2023-03-17 RX ADMIN — SODIUM CHLORIDE 100 ML/HR: 9 INJECTION, SOLUTION INTRAVENOUS at 01:03

## 2023-03-17 RX ADMIN — ESCITALOPRAM OXALATE 20 MG: 20 TABLET ORAL at 08:02

## 2023-03-17 RX ADMIN — LEVETIRACETAM 500 MG: 500 TABLET, FILM COATED ORAL at 01:03

## 2023-03-17 RX ADMIN — Medication 0.02 MCG/KG/MIN: at 04:13

## 2023-03-17 RX ADMIN — AMLODIPINE BESYLATE 5 MG: 5 TABLET ORAL at 14:11

## 2023-03-17 RX ADMIN — LEVETIRACETAM 500 MG: 500 TABLET, FILM COATED ORAL at 08:07

## 2023-03-17 RX ADMIN — ATORVASTATIN CALCIUM 80 MG: 40 TABLET, FILM COATED ORAL at 20:57

## 2023-03-17 RX ADMIN — Medication 1 PATCH: at 01:03

## 2023-03-17 RX ADMIN — TAZOBACTAM SODIUM AND PIPERACILLIN SODIUM 3.38 G: 375; 3 INJECTION, SOLUTION INTRAVENOUS at 21:00

## 2023-03-17 RX ADMIN — HYDROCODONE BITARTRATE AND ACETAMINOPHEN 1 TABLET: 5; 325 TABLET ORAL at 16:08

## 2023-03-17 RX ADMIN — HEPARIN SODIUM 5000 UNITS: 5000 INJECTION INTRAVENOUS; SUBCUTANEOUS at 21:00

## 2023-03-17 RX ADMIN — GABAPENTIN 200 MG: 100 CAPSULE ORAL at 16:08

## 2023-03-17 RX ADMIN — BUSPIRONE HYDROCHLORIDE 5 MG: 10 TABLET ORAL at 20:57

## 2023-03-17 RX ADMIN — LEVETIRACETAM 500 MG: 500 TABLET, FILM COATED ORAL at 20:57

## 2023-03-17 RX ADMIN — ATORVASTATIN CALCIUM 80 MG: 40 TABLET, FILM COATED ORAL at 01:03

## 2023-03-17 RX ADMIN — TAZOBACTAM SODIUM AND PIPERACILLIN SODIUM 3.38 G: 375; 3 INJECTION, SOLUTION INTRAVENOUS at 14:11

## 2023-03-17 RX ADMIN — PANTOPRAZOLE SODIUM 40 MG: 40 TABLET, DELAYED RELEASE ORAL at 05:33

## 2023-03-17 NOTE — PAYOR COMM NOTE
"[facility]   phone: [phone]  fax: [phone]      Gabriela Waller (62 y.o. Female)     Date of Birth   1960    Social Security Number       Address   [facility] [place] [place] [address]    Home Phone   [phone]    MRN   [id number]       [facility]   [employer]    Marital Status                               Admission Date   3/16/23    Admission Type   Emergency    Admitting Provider   Mushtaq Cuello MD    Attending Provider   Mushtaq Cuello MD    Department, Room/Bed   [facility] 2B ICU, N234/1       Discharge Date       Discharge Disposition       Discharge Destination                               Attending Provider: Mushtaq Cuello MD    Allergies: No Known Allergies    Isolation: None   Infection: None   Code Status: CPR    Ht: 152.4 cm (60\")   Wt: 58.5 kg (128 lb 15.5 oz)    Admission Cmt: None   Principal Problem: Hypotension due to drugs [I95.2]                 Active Insurance as of 3/16/2023     Primary Coverage     Payor Plan Insurance Group Employer/Plan Group    [facility] MEDICARE REPLACEMENT [facility] DUAL COMPLETE MEDICARE REPLACEMENT [id number]     Payor Plan Address Payor Plan Phone Number Payor Plan Fax Number Effective Dates    [address] [phone]  2/1/2022 - None Entered    [facility] [address]       Subscriber Name Subscriber Birth Date Member ID       GABRIELA WALLER 1960 [id number]           Secondary Coverage     Payor Plan Insurance Group Employer/Plan Group    [facility] MEDICAID      Payor Plan Address Payor Plan Phone Number Payor Plan Fax Number Effective Dates    [address] [address] [phone]  12/8/2021 - None Entered    [facility] [id number]       Subscriber Name Subscriber Birth Date Member ID       GABRIELA WALLER 1960 [id number]                 Emergency Contacts      (Rel.) Home Phone Work Phone Mobile Phone    CISCO WALLER (Daughter) [phone] -- " [phone]    Mango Polanco (Other) -- -- [phone]               History & Physical      Mushtaq Cuello MD at [phone]                Chief complaint: Altered mental status    Subjective      62-year-old female with a past medical history significant for CKD, CAD, hypertension, dyslipidemia, and seizure disorder among others.  She presented to the ER this evening with altered mental status.  EMS was called for decreased LOC.  On their arrival her SBP was in the upper 40s.  She received IV fluids in route as well as a norepinephrine drip.  With improved pressure her mentation improved.    She describes having several episodes of diarrhea over the last several days.  She has felt weak and stayed in bed.  She has had some mild nausea but said she had been drinking water without difficulty.    She received nearly 3 L of IV fluids in the ER but due to her marginal blood pressure ICU admission was requested.      History  Past Medical History:   Diagnosis Date   • Arthritis    • Chronic kidney disease    • Headache    • Heart disease    • Heart failure (HCC)    • Hypertension    • Low back pain    • Osteoporosis    • Recent urinary tract infection    • Seizures (HCC)      Past Surgical History:   Procedure Laterality Date   •  SECTION     • COLONOSCOPY       Family History   Problem Relation Age of Onset   • Arthritis Mother    • Heart disease Mother    • Hypertension Mother    • Diabetes Mother    • Asthma Father    • Heart disease Father    • Hypertension Father    • Kidney disease Father    • Kidney disease Sister      Social History     Tobacco Use   • Smoking status: Every Day     Packs/day: 1.50     Types: Cigarettes   • Smokeless tobacco: Never   Vaping Use   • Vaping Use: Never used   Substance Use Topics   • Alcohol use: Yes     Comment: Occ wine   • Drug use: Never       Review of Systems   Pertinent items are noted in HPI      Objective      Vital Signs  Temp:  [97.7 °F (36.5  "°C)] 97.7 °F (36.5 °C)  Heart Rate:  [58-69] 58  Resp:  [16-18] 16  BP: (64-94)/(45-66) 94/66    Physical Exam:    Objective:  General Appearance:  In no acute distress.    Vital signs: (most recent): Blood pressure 94/66, pulse 58, temperature 97.7 °F (36.5 °C), temperature source Oral, resp. rate 16, height 152.4 cm (60\"), weight 57.6 kg (126 lb 15.8 oz), SpO2 98 %.    HEENT: Normal HEENT exam.    Lungs:  Normal effort and normal respiratory rate.  Breath sounds clear to auscultation.  She is not in respiratory distress.  No rales, wheezes or rhonchi.    Heart: Normal rate.  Regular rhythm.  S1 normal and S2 normal.  No murmur, gallop or friction rub.   Chest: Symmetric chest wall expansion.   Abdomen: Abdomen is soft and non-distended.  Bowel sounds are normal.   There is no abdominal tenderness.   There is no mass. There is no splenomegaly. There is no hepatomegaly.   Extremities: There is no deformity or dependent edema.    Neurological: Patient is alert and oriented to person, place and time.    Pupils:  Pupils are equal, round, and reactive to light.    Skin:  Warm and dry.              Results Review:    I reviewed the patient's new clinical results.  I reviewed the patient's new imaging results and agree with the interpretation.  I reviewed the patient's other test results and agree with the interpretation    Assessment & Plan     Assessment:    Active Hospital Problems    Diagnosis    • **Hypotension due to drugs    • Chronic kidney disease    • Hypertension    • Seizures (HCC)        62-year-old female with a past medical history significant for CKD, CAD, hypertension, dyslipidemia, and seizure disorder among others.  She presents with symptomatic hypotension without apparent cause.  There is no evidence of an infection systemically or evidence of a source of infection.  She is on multiple blood pressure medications including spironolactone, metoprolol, losartan, hydralazine, and amlodipine.    I suspect " she has had excessive amounts of blood pressure medication as the primary issue.  Her son-in-law is at the bedside and says that when his wife, her daughter, gives her her blood pressure medicine she typically does not give them all to her since they make her weak and dizzy.  However, when the patient takes her own medication she will often take all of them.  There may also be some component of dehydration but this is not apparent.  There is also no clear source of infection.  She has received empiric antibiotics and has had blood cultures.  Her chest x-ray and urinalysis are benign.    Plan:    Plan:    1. Admit to ICU for observation  2. Continue IV fluids  3. Advance diet  4. Hold blood pressure meds  5. Expect we will have to reinitiate her blood pressure meds serially but I doubt she will need all 5 of her medications based upon what her family has reported.  This will need to be reevaluated by her primary care provider.   6. Will continue Zosyn for now but expect this will prove to be superfluous    I discussed the patients findings and my recommendations with patient, family, nursing staff and Dr. Maza.       Mushtaq Cuello MD  Pulmonary and Critical Care Medicine  03/17/23  00:25 EDT            Electronically signed by Mushtaq Cuello MD at 03/17/23 0031         Lab Results (last 24 hours)     Procedure Component Value Units Date/Time    TSH [[id number]]  (Normal) Collected: 03/17/23 0756    Specimen: Blood Updated: 03/17/23 0934     TSH 1.220 uIU/mL     Ethanol [[id number]]  (Normal) Collected: 03/17/23 0756    Specimen: Blood Updated: 03/17/23 0917     Ethanol <10 mg/dL     Narrative:      Elevated lactic acid concentration and lactate dehydrogenase(LD) activity may falsely elevate enzymatically determined ethanol levels. Not for legal purposes.     Cortisol [[id number]] Collected: 03/17/23 0756    Specimen: Blood Updated: 03/17/23 0905    Potassium [[id number]]  (Abnormal) Collected:  03/17/23 0756    Specimen: Blood Updated: 03/17/23 0843     Potassium 3.3 mmol/L     Basic Metabolic Panel [[id number]]  (Abnormal) Collected: 03/17/23 0305    Specimen: Blood Updated: 03/17/23 0354     Glucose 94 mg/dL      BUN 23 mg/dL      Creatinine 2.35 mg/dL      Sodium 136 mmol/L      Potassium 2.8 mmol/L      Chloride 101 mmol/L      CO2 24.0 mmol/L      Calcium 7.3 mg/dL      BUN/Creatinine Ratio 9.8     Anion Gap 11.0 mmol/L      eGFR 22.6 mL/min/1.73     Narrative:      GFR Normal >60  Chronic Kidney Disease <60  Kidney Failure <15      Magnesium [[id number]]  (Normal) Collected: 03/17/23 0305    Specimen: Blood Updated: 03/17/23 0354     Magnesium 2.2 mg/dL      Comment: Result checked       Phosphorus [[id number]]  (Normal) Collected: 03/17/23 0305    Specimen: Blood Updated: 03/17/23 0346     Phosphorus 4.2 mg/dL     CBC & Differential [[id number]]  (Abnormal) Collected: 03/17/23 0305    Specimen: Blood Updated: 03/17/23 0328    Narrative:      The following orders were created for panel order CBC & Differential.  Procedure                               Abnormality         Status                     ---------                               -----------         ------                     CBC Auto Differential[[id number]]        Abnormal            Final result                 Please view results for these tests on the individual orders.    CBC Auto Differential [[id number]]  (Abnormal) Collected: 03/17/23 0305    Specimen: Blood Updated: 03/17/23 0328     WBC 11.58 10*3/mm3      RBC 3.38 10*6/mm3      Hemoglobin 10.8 g/dL      Hematocrit 32.8 %      MCV 97.0 fL      MCH 32.0 pg      MCHC 32.9 g/dL      RDW 13.7 %      RDW-SD 49.1 fl      MPV 9.0 fL      Platelets 403 10*3/mm3      Neutrophil % 66.9 %      Lymphocyte % 22.5 %      Monocyte % 9.8 %      Eosinophil % 0.1 %      Basophil % 0.3 %      Immature Grans % 0.4 %      Neutrophils, Absolute 7.73 10*3/mm3      Lymphocytes, Absolute 2.61 10*3/mm3       Monocytes, Absolute 1.14 10*3/mm3      Eosinophils, Absolute 0.01 10*3/mm3      Basophils, Absolute 0.04 10*3/mm3      Immature Grans, Absolute 0.05 10*3/mm3      nRBC 0.0 /100 WBC     Madison Draw [[id number]] Collected: 03/16/23 2115    Specimen: Blood Updated: 03/17/23 0130    Narrative:      The following orders were created for panel order [place] Draw.  Procedure                               Abnormality         Status                     ---------                               -----------         ------                     Green Top (Gel)[[id number]]                                  Final result               Lavender Top[[id number]]                                     Final result               Gold Top - SST[[id number]]                                   Final result               Le[place]uga Top[[id number]]                                         Final result               Light Blue Top[[id number]]                                   Final result                 Please view results for these tests on the individual orders.    Gray Top [[id number]] Collected: 03/16/23 2115    Specimen: Blood Updated: 03/17/23 0130     Extra Tube Hold for add-ons.     Comment: Auto resulted.       STAT Lactic Acid, Reflex [[id number]]  (Normal) Collected: 03/16/23 2359    Specimen: Blood Updated: 03/17/23 0039     Lactate 1.6 mmol/L      Comment: Falsely depressed results may occur on samples drawn from patients receiving N-Acetylcysteine (NAC) or Metamizole.       High Sensitivity Troponin T 2Hr [[id number]]  (Abnormal) Collected: 03/16/23 2359    Specimen: Blood Updated: 03/17/23 0037     HS Troponin T 34 ng/L      Troponin T Delta -5 ng/L     Narrative:      High Sensitive Troponin T Reference Range:  <10.0 ng/L- Negative Female for AMI  <15.0 ng/L- Negative Male for AMI  >=10 - Abnormal Female indicating possible myocardial injury.  >=15 - Abnormal Male indicating possible myocardial injury.   Clinicians would have to utilize clinical  acumen, EKG, Troponin, and serial changes to determine if it is an Acute Myocardial Infarction or myocardial injury due to an underlying chronic condition.         COVID PRE-OP / PRE-PROCEDURE SCREENING ORDER (NO ISOLATION) - Swab, Nasopharynx [[id number]]  (Normal) Collected: 03/16/23 2142    Specimen: Swab from Nasopharynx Updated: 03/16/23 2243    Narrative:      The following orders were created for panel order COVID PRE-OP / PRE-PROCEDURE SCREENING ORDER (NO ISOLATION) - Swab, Nasopharynx.  Procedure                               Abnormality         Status                     ---------                               -----------         ------                     Respiratory Panel PCR w/...[[id number]]  Normal              Final result                 Please view results for these tests on the individual orders.    Respiratory Panel PCR w/COVID-19(SARS-CoV-2) DERRELL/CINDY/DIAMANTE/PAD/COR/MAD/WALE In-House, NP Swab in UTM/VTM, 3-4 HR TAT - Swab, Nasopharynx [[id number]]  (Normal) Collected: 03/16/23 2142    Specimen: Swab from Nasopharynx Updated: 03/16/23 2243     ADENOVIRUS, PCR Not Detected     Coronavirus 229E Not Detected     Coronavirus HKU1 Not Detected     Coronavirus NL63 Not Detected     Coronavirus OC43 Not Detected     COVID19 Not Detected     Human Metapneumovirus Not Detected     Human Rhinovirus/Enterovirus Not Detected     Influenza A PCR Not Detected     Influenza B PCR Not Detected     Parainfluenza Virus 1 Not Detected     Parainfluenza Virus 2 Not Detected     Parainfluenza Virus 3 Not Detected     Parainfluenza Virus 4 Not Detected     RSV, PCR Not Detected     Bordetella pertussis pcr Not Detected     Bordetella parapertussis PCR Not Detected     Chlamydophila pneumoniae PCR Not Detected     Mycoplasma pneumo by PCR Not Detected    Narrative:      In the setting of a positive respiratory panel with a viral infection PLUS a negative procalcitonin without other underlying concern for bacterial infection,  consider observing off antibiotics or discontinuation of antibiotics and continue supportive care. If the respiratory panel is positive for atypical bacterial infection (Bordetella pertussis, Chlamydophila pneumoniae, or Mycoplasma pneumoniae), consider antibiotic de-escalation to target atypical bacterial infection.    Protime-INR [[id number]]  (Normal) Collected: 03/16/23 2115    Specimen: Blood Updated: 03/16/23 2237     Protime 13.4 Seconds      INR 1.03    aPTT [[id number]]  (Normal) Collected: 03/16/23 2115    Specimen: Blood Updated: 03/16/23 2237     PTT 26.8 seconds     Narrative:      PTT = The equivalent PTT values for the therapeutic range of heparin levels at 0.3 to 0.5 U/ml are 60 to 70 seconds.    High Sensitivity Troponin T [[id number]]  (Abnormal) Collected: 03/16/23 2148    Specimen: Blood Updated: 03/16/23 2235     HS Troponin T 39 ng/L     Narrative:      High Sensitive Troponin T Reference Range:  <10.0 ng/L- Negative Female for AMI  <15.0 ng/L- Negative Male for AMI  >=10 - Abnormal Female indicating possible myocardial injury.  >=15 - Abnormal Male indicating possible myocardial injury.   Clinicians would have to utilize clinical acumen, EKG, Troponin, and serial changes to determine if it is an Acute Myocardial Infarction or myocardial injury due to an underlying chronic condition.         Blood Culture - Blood, Arm, Left [[id number]] Collected: 03/16/23 2140    Specimen: Blood from Arm, Left Updated: 03/16/23 2234    Blood Culture - Blood, Arm, Left [[id number]] Collected: 03/16/23 2150    Specimen: Blood from Arm, Left Updated: 03/16/23 2233    Green Top (Gel) [[id number]] Collected: 03/16/23 2115    Specimen: Blood Updated: 03/16/23 2231     Extra Tube Hold for add-ons.     Comment: Auto resulted.       Lavender Top [[id number]] Collected: 03/16/23 2115    Specimen: Blood Updated: 03/16/23 2231     Extra Tube hold for add-on     Comment: Auto resulted       Gold Top - SST [[id number]] Collected:  03/16/23 2115    Specimen: Blood Updated: 03/16/23 2231     Extra Tube Hold for add-ons.     Comment: Auto resulted.       Light Blue Top [[id number]] Collected: 03/16/23 2115    Specimen: Blood Updated: 03/16/23 2231     Extra Tube Hold for add-ons.     Comment: Auto resulted       Urinalysis, Microscopic Only - Urine, Catheter [[id number]]  (Abnormal) Collected: 03/16/23 2141    Specimen: Urine, Catheter Updated: 03/16/23 2229     RBC, UA 0-2 /HPF      WBC, UA 3-5 /HPF      Bacteria, UA Trace /HPF      Squamous Epithelial Cells, UA 7-12 /HPF      Hyaline Casts, UA None Seen /LPF      Amorphous Crystals, UA Moderate/2+ /HPF      Methodology Manual Light Microscopy    Urinalysis With Microscopic If Indicated (No Culture) - Urine, Catheter [[id number]]  (Abnormal) Collected: 03/16/23 2141    Specimen: Urine, Catheter Updated: 03/16/23 2227     Color, UA Dark Yellow     Appearance, UA Turbid     pH, UA 5.5     Specific Gravity, UA 1.025     Glucose,  mg/dL (Trace)     Ketones, UA Negative     Bilirubin, UA Negative     Blood, UA Moderate (2+)     Protein, UA >=300 mg/dL (3+)     Leuk Esterase, UA Negative     Nitrite, UA Negative     Urobilinogen, UA 1.0 E.U./dL    Blood Gas, Venous With Co-Ox [[id number]]  (Abnormal) Collected: 03/16/23 2219    Specimen: Venous Blood Updated: 03/16/23 2219     Site Nurse/Dr Draw     pH, Venous 7.375 pH Units      pCO2, Venous 45.9 mm Hg      pO2, Venous 18.2 mm Hg      Comment: 84 Value below reference range        HCO3, Venous 26.8 mmol/L      Base Excess, Venous 1.2 mmol/L      Hemoglobin, Blood Gas 12.2 g/dL      Oxyhemoglobin Venous 29.5 %      Comment: 84 Value below reference range        Methemoglobin Venous 0.3 %      Carboxyhemoglobin Venous 2.3 %      Comment: 83 Value above reference range        CO2 Content 28.2 mmol/L      Temperature 37.0 C      Barometric Pressure for Blood Gas --     Comment: N/A        Modality Room Air     FIO2 21 %      Rate 0 Breaths/minute       "PIP 0 cmH2O      Comment: Meter: [id number]     :  [id number]        IPAP 0     EPAP 0     Note --    Procalcitonin [[id number]]  (Abnormal) Collected: 03/16/23 2115    Specimen: Blood Updated: 03/16/23 2215     Procalcitonin 0.33 ng/mL     Narrative:      As a Marker for Sepsis (Non-Neonates):    1. <0.5 ng/mL represents a low risk of severe sepsis and/or septic shock.  2. >2 ng/mL represents a high risk of severe sepsis and/or septic shock.    As a Marker for Lower Respiratory Tract Infections that require antibiotic therapy:    PCT on Admission    Antibiotic Therapy       6-12 Hrs later    >0.5                Strongly Recommended  >0.25 - <0.5        Recommended   0.1 - 0.25          Discouraged              Remeasure/reassess PCT  <0.1                Strongly Discouraged     Remeasure/reassess PCT    As 28 day mortality risk marker: \"Change in Procalcitonin Result\" (>80% or <=80%) if Day 0 (or Day 1) and Day 4 values are available. Refer to http://www.[facility]-pct-calculator.com    Change in PCT <=80%  A decrease of PCT levels below or equal to 80% defines a positive change in PCT test result representing a higher risk for 28-day all-cause mortality of patients diagnosed with severe sepsis for septic shock.    Change in PCT >80%  A decrease of PCT levels of more than 80% defines a negative change in PCT result representing a lower risk for 28-day all-cause mortality of patients diagnosed with severe sepsis or septic shock.       Comprehensive Metabolic Panel [[id number]]  (Abnormal) Collected: 03/16/23 2115    Specimen: Blood Updated: 03/16/23 2215     Glucose 114 mg/dL      BUN 23 mg/dL      Creatinine 2.44 mg/dL      Sodium 132 mmol/L      Potassium 3.4 mmol/L      Comment: Specimen hemolyzed.  Results may be affected.        Chloride 89 mmol/L      CO2 27.0 mmol/L      Calcium 8.5 mg/dL      Total Protein 6.0 g/dL      Albumin 3.6 g/dL      ALT (SGPT) 9 U/L      Comment: Specimen hemolyzed.  Results " may be affected.        AST (SGOT) 18 U/L      Alkaline Phosphatase 77 U/L      Total Bilirubin 0.4 mg/dL      Globulin 2.4 gm/dL      Comment: Calculated Result        A/G Ratio 1.5 g/dL      BUN/Creatinine Ratio 9.4     Anion Gap 16.0 mmol/L      eGFR 21.6 mL/min/1.73     Narrative:      GFR Normal >60  Chronic Kidney Disease <60  Kidney Failure <15      Magnesium [[id number]]  (Normal) Collected: 03/16/23 2115    Specimen: Blood Updated: 03/16/23 2214     Magnesium 1.6 mg/dL     Single High Sensitivity Troponin T [[id number]]  (Abnormal) Collected: 03/16/23 2115    Specimen: Blood Updated: 03/16/23 2210     HS Troponin T 39 ng/L     Narrative:      High Sensitive Troponin T Reference Range:  <10.0 ng/L- Negative Female for AMI  <15.0 ng/L- Negative Male for AMI  >=10 - Abnormal Female indicating possible myocardial injury.  >=15 - Abnormal Male indicating possible myocardial injury.   Clinicians would have to utilize clinical acumen, EKG, Troponin, and serial changes to determine if it is an Acute Myocardial Infarction or myocardial injury due to an underlying chronic condition.         Lactic Acid, Plasma [[id number]]  (Abnormal) Collected: 03/16/23 2115    Specimen: Blood Updated: 03/16/23 2205     Lactate 2.5 mmol/L      Comment: Falsely depressed results may occur on samples drawn from patients receiving N-Acetylcysteine (NAC) or Metamizole.       Calcium, Ionized [[id number]]  (Normal) Collected: 03/16/23 2115    Specimen: Blood Updated: 03/16/23 2202     Ionized Calcium 1.17 mmol/L     CBC & Differential [[id number]]  (Abnormal) Collected: 03/16/23 2115    Specimen: Blood Updated: 03/16/23 2133    Narrative:      The following orders were created for panel order CBC & Differential.  Procedure                               Abnormality         Status                     ---------                               -----------         ------                     CBC Auto Differential[[id number]]        Abnormal             Final result                 Please view results for these tests on the individual orders.    CBC Auto Differential [[id number]]  (Abnormal) Collected: 03/16/23 2115    Specimen: Blood Updated: 03/16/23 2133     WBC 16.24 10*3/mm3      RBC 4.11 10*6/mm3      Hemoglobin 13.1 g/dL      Hematocrit 38.9 %      MCV 94.6 fL      MCH 31.9 pg      MCHC 33.7 g/dL      RDW 13.6 %      RDW-SD 47.4 fl      MPV 8.9 fL      Platelets 492 10*3/mm3      Neutrophil % 72.1 %      Lymphocyte % 15.9 %      Monocyte % 11.0 %      Eosinophil % 0.1 %      Basophil % 0.3 %      Immature Grans % 0.6 %      Neutrophils, Absolute 11.71 10*3/mm3      Lymphocytes, Absolute 2.58 10*3/mm3      Monocytes, Absolute 1.78 10*3/mm3      Eosinophils, Absolute 0.02 10*3/mm3      Basophils, Absolute 0.05 10*3/mm3      Immature Grans, Absolute 0.10 10*3/mm3      nRBC 0.0 /100 WBC         Imaging Results (Last 24 Hours)     Procedure Component Value Units Date/Time    XR Chest 1 View [[id number]] Collected: 03/16/23 2132     Updated: 03/16/23 2136    Narrative:      XR CHEST 1 VW    Date of Exam: 3/16/2023 9:19 PM EDT    Indication: Weak/Dizzy/AMS triage protocol.    Comparison: October 15, 2022    Findings:  The heart is not definitely enlarged. The lungs seem clear. There are no pleural effusions. There are degenerative changes involving the right shoulder area.      Impression:      Impression:  1.An acute pulmonary process is not apparent.    Electronically Signed: Azeem Howe    3/16/2023 9:33 PM EDT    Workstation ID: [id number]        Physician Progress Notes (last 24 hours)  Notes from 03/16/23 1020 through 03/17/23 1020   No notes of this type exist for this encounter.         Consult Notes (last 24 hours)  Notes from 03/16/23 1020 through 03/17/23 1020   No notes of this type exist for this encounter.

## 2023-03-17 NOTE — CASE MANAGEMENT/SOCIAL WORK
Discharge Planning Assessment  [facility]     Patient Name: Gabriela Waller  MRN: [id number]  Today's Date: 3/17/2023    Admit Date: 3/16/2023    Plan: Ongoing   Discharge Needs Assessment     Row Name 03/17/23 1449       Living Environment    People in Home child(sangita), dependent    Name(s) of People in Home Daughter Enedina and son in law Mango    Current Living Arrangements home    Primary Care Provided by self;child(sangita)    Provides Primary Care For no one, unable/limited ability to care for self    Family Caregiver if Needed child(sangita), adult    Quality of Family Relationships helpful;involved;supportive       Resource/Environmental Concerns    Resource/Environmental Concerns none       Food Insecurity    Within the past 12 months, you worried that your food would run out before you got the money to buy more. Never true       Transition Planning    Patient/Family Anticipates Transition to home with family    Patient/Family Anticipated Services at Transition     Transportation Anticipated family or friend will provide       Discharge Needs Assessment    Readmission Within the Last 30 Days no previous admission in last 30 days    Equipment Currently Used at Home cane, straight    Concerns to be Addressed discharge planning    Anticipated Changes Related to Illness none    Current Discharge Risk chronically ill               Discharge Plan     Row Name 03/17/23 0731       Plan    Plan Ongoing      Plan Comments I met with Ms. Waller and her son in law today at the bedside to discuss discharge planning. Ms. Amin lives in [facility] with her daughter and son in law. Prior to admission she was independent, used a straight cane, and was not current with any therapy. Physical and occupational therapy has been ordered. The discharge plan is pending their evaluations. Case management will continue to follow Ms. Waller' progress and provide for her a safe discharge plan.              Continued  Care and Services - Admitted Since 3/16/2023    Coordination has not been started for this encounter.       Expected Discharge Date and Time     Expected Discharge Date Expected Discharge Time    Mar 19, 2023          Demographic Summary     Row Name 03/17/23 1447       General Information    General Information Comments Insurance provider is [facility] Medicare and [employer]. Primary provider is Margarita Lynch. Fully vaccinated for covid with no booster. No advanced directive or living will.               Functional Status     Row Name 03/17/23 1449       Functional Status, IADL    Medications independent    Meal Preparation assistive person    Housekeeping assistive person    Laundry assistive person    Shopping assistive person       Mental Status Summary    Recent Changes in Mental Status/Cognitive Functioning no changes       Employment/    Employment Status retired         Joy Colon RN

## 2023-03-17 NOTE — H&P
Chief complaint: Altered mental status    Subjective     62-year-old female with a past medical history significant for CKD, CAD, hypertension, dyslipidemia, and seizure disorder among others.  She presented to the ER this evening with altered mental status.  EMS was called for decreased LOC.  On their arrival her SBP was in the upper 40s.  She received IV fluids in route as well as a norepinephrine drip.  With improved pressure her mentation improved.    She describes having several episodes of diarrhea over the last several days.  She has felt weak and stayed in bed.  She has had some mild nausea but said she had been drinking water without difficulty.    She received nearly 3 L of IV fluids in the ER but due to her marginal blood pressure ICU admission was requested.      History  Past Medical History:   Diagnosis Date   • Arthritis    • Chronic kidney disease    • Headache    • Heart disease    • Heart failure (HCC)    • Hypertension    • Low back pain    • Osteoporosis    • Recent urinary tract infection    • Seizures (HCC)      Past Surgical History:   Procedure Laterality Date   •  SECTION     • COLONOSCOPY       Family History   Problem Relation Age of Onset   • Arthritis Mother    • Heart disease Mother    • Hypertension Mother    • Diabetes Mother    • Asthma Father    • Heart disease Father    • Hypertension Father    • Kidney disease Father    • Kidney disease Sister      Social History     Tobacco Use   • Smoking status: Every Day     Packs/day: 1.50     Types: Cigarettes   • Smokeless tobacco: Never   Vaping Use   • Vaping Use: Never used   Substance Use Topics   • Alcohol use: Yes     Comment: Occ wine   • Drug use: Never       Review of Systems   Pertinent items are noted in HPI      Objective     Vital Signs  Temp:  [97.7 °F (36.5 °C)] 97.7 °F (36.5 °C)  Heart Rate:  [58-69] 58  Resp:  [16-18] 16  BP: (64-94)/(45-66) 94/66    Physical Exam:    Objective:  General Appearance:  In no  "acute distress.    Vital signs: (most recent): Blood pressure 94/66, pulse 58, temperature 97.7 °F (36.5 °C), temperature source Oral, resp. rate 16, height 152.4 cm (60\"), weight 57.6 kg (126 lb 15.8 oz), SpO2 98 %.    HEENT: Normal HEENT exam.    Lungs:  Normal effort and normal respiratory rate.  Breath sounds clear to auscultation.  She is not in respiratory distress.  No rales, wheezes or rhonchi.    Heart: Normal rate.  Regular rhythm.  S1 normal and S2 normal.  No murmur, gallop or friction rub.   Chest: Symmetric chest wall expansion.   Abdomen: Abdomen is soft and non-distended.  Bowel sounds are normal.   There is no abdominal tenderness.   There is no mass. There is no splenomegaly. There is no hepatomegaly.   Extremities: There is no deformity or dependent edema.    Neurological: Patient is alert and oriented to person, place and time.    Pupils:  Pupils are equal, round, and reactive to light.    Skin:  Warm and dry.              Results Review:    I reviewed the patient's new clinical results.  I reviewed the patient's new imaging results and agree with the interpretation.  I reviewed the patient's other test results and agree with the interpretation    Assessment & Plan     Assessment:    Active Hospital Problems    Diagnosis    • **Hypotension due to drugs    • Chronic kidney disease    • Hypertension    • Seizures (HCC)        62-year-old female with a past medical history significant for CKD, CAD, hypertension, dyslipidemia, and seizure disorder among others.  She presents with symptomatic hypotension without apparent cause.  There is no evidence of an infection systemically or evidence of a source of infection.  She is on multiple blood pressure medications including spironolactone, metoprolol, losartan, hydralazine, and amlodipine.    I suspect she has had excessive amounts of blood pressure medication as the primary issue.  Her son-in-law is at the bedside and says that when his wife, her " daughter, gives her her blood pressure medicine she typically does not give them all to her since they make her weak and dizzy.  However, when the patient takes her own medication she will often take all of them.  There may also be some component of dehydration but this is not apparent.  There is also no clear source of infection.  She has received empiric antibiotics and has had blood cultures.  Her chest x-ray and urinalysis are benign.    Plan:    Plan:    1. Admit to ICU for observation  2. Continue IV fluids  3. Advance diet  4. Hold blood pressure meds  5. Expect we will have to reinitiate her blood pressure meds serially but I doubt she will need all 5 of her medications based upon what her family has reported.  This will need to be reevaluated by her primary care provider.   6. Will continue Zosyn for now but expect this will prove to be superfluous    I discussed the patients findings and my recommendations with patient, family, nursing staff and Dr. Maza.       Mushtaq Cuello MD  Pulmonary and Critical Care Medicine  03/17/23  00:25 EDT

## 2023-03-17 NOTE — PROGRESS NOTES
"INTENSIVIST   PROGRESS NOTE     Hospital:  LOS: 1 day     Chief Complaint: Hypotension    Subjective   S     Interval History: No acute events since admission.  Blood pressure improving; off pressors during morning assessment.    The patient's relevant past medical, surgical and social history were reviewed and updated in Epic as appropriate.      ROS: Back pain, dizziness, malaise, diarrhea    Objective   O     Intake/Ouptut 24 hrs (7:00AM - 6:59 AM)  Intake & Output (last 3 days)       03/14 0701  03/15 0700 03/15 0701 03/16 0700 03/16 0701 03/17 0700 03/17 0701 03/18 0700    P.O.   240     I.V. (mL/kg)    2784.9 (47.6)    IV Piggyback   2751 159.1    Total Intake(mL/kg)   2991 (51.1) 2944 (50.3)    Urine (mL/kg/hr)   550 900 (1.9)    Stool    0    Total Output   550 900    Net   +2441 +2044            Stool Unmeasured Occurrence    1 x        Medications (drips):  sodium chloride, Last Rate: 100 mL/hr (03/17/23 1127)    Respiratory Support: Room air     Physical Examination:  Vital Signs: Blood pressure 123/60, pulse 79, temperature 97.5 °F (36.4 °C), temperature source Oral, resp. rate 16, height 152.4 cm (60\"), weight 58.5 kg (128 lb 15.5 oz), SpO2 95 %.    General: The patient appears in no acute distress. Alert, cooperative and interactive.  HEENT:NC/AT, PERRL, Normal nasal mucosa.   Neck: Trachea midline, No masses.  Chest: Clear to auscultation bilaterally, No wheezing, rhonchi, or rales. Normal work of breathing. Equal chest rise.  Cardiac: Regular rhythm, normal rate, S1S2 auscultated. No murmurs, rubs or gallops.   Abdomen: Soft.  Mild tenderness in right lower quadrant. Non-distended, positive bowel sounds.   Extremities: No lower extremity edema. No clubbing or cyanosis.  Neuro: Motor power grossly intact bilaterally. Sensation intact. Speech fluid and fluent. Thought process coherent.  Psych: Alert and oriented x3. Mood stable.    Lines, Drains & Airways     Active LDAs     Name Placement date " Placement time Site Days    Peripheral IV 10/16/22 0000 Anterior;Distal;Left;Upper Arm 10/16/22  0000  Arm  152    Peripheral IV 03/16/23 2105 Left Forearm 03/16/23 2105  Forearm  less than 1    Peripheral IV 03/16/23 2106 Right Antecubital 03/16/23 2106  Antecubital  less than 1    External Urinary Catheter 10/16/22  0255  --  152    External Urinary Catheter 03/17/23  0000  --  less than 1              Results from last 7 days   Lab Units 03/17/23  0305 03/16/23 2115   WBC 10*3/mm3 11.58* 16.24*   HEMOGLOBIN g/dL 10.8* 13.1   MCV fL 97.0 94.6   PLATELETS 10*3/mm3 403 492*     Results from last 7 days   Lab Units 03/17/23  0756 03/17/23 0305 03/16/23 2115   SODIUM mmol/L  --  136 132*   POTASSIUM mmol/L 3.3* 2.8* 3.4*   CO2 mmol/L  --  24.0 27.0   CREATININE mg/dL  --  2.35* 2.44*   GLUCOSE mg/dL  --  94 114*   MAGNESIUM mg/dL  --  2.2 1.6   PHOSPHORUS mg/dL  --  4.2  --      Estimated Creatinine Clearance: 19.4 mL/min (A) (by C-G formula based on SCr of 2.35 mg/dL (H)).  Results from last 7 days   Lab Units 03/16/23 2115   ALK PHOS U/L 77   BILIRUBIN mg/dL 0.4   ALT (SGPT) U/L 9   AST (SGOT) U/L 18       Results from last 7 days   Lab Units 03/16/23  2219   FIO2 % 21     CXR (3/16/2023):  Radiology Impression:   An acute pulmonary process is not apparent.     Results: Reviewed.    I reviewed the patient's new laboratory and imaging results.  I independently reviewed the patient's new images.    Medications: Reviewed.    Assessment & Plan   A / P     Active Hospital Problems    Diagnosis    • **Hypotension due to drugs    • Chronic kidney disease    • Hypertension    • Seizures (HCC)      Patient Christin is a 62-year-old female with past medical history of CKD, CAD, hypertension, dyslipidemia and seizure disorder.  She was admitted to the ICU on 3/16/2023 with hypotension in the setting of 2-3x days of diarrhea, malaise and vague abdominal pain.  Patient was fluid resuscitated and placed on vasopressor  support.  Septic work-up initiated.    -Hypotension likely multifactorial in the setting of volume loss from diarrhea/possible GI illness and multiple antihypertensives + others that affect hemodynamics (i.e. tizanidine, trazodone, opioids).  Patient placed on broad-spectrum antibiotics initially.  Vancomycin discontinued.  She continues therapy with Zosyn which will remain while awaiting cultures.  Off vasopressors this morning.  In fact have restarted amlodipine (< home dose) and Norco (< home dose).  We will restart home medications--staying cognizant of the fact that patient may need a less aggressive blood pressure regimen at discharge.   -TSH, cortisol, ethanol, UDS, lactate WNL.  Cultures no growth to date.  Respiratory viral panel negative.  UA not consistent with UTI.  Troponin elevated but likely in the setting of hypotension; no delta.  -Will continue Keppra for known seizure disorder.  Restart other home medications as able.    F-Tube feeds per dietary recs  A-NA  S-NA  T-Heparin SQ  H-Head of bed greater than 30 degrees  U-PPI  G-FSBS per unit protocol, correction dose insulin  S-NA  B-Will monitor daily and provide regimen if indicated  I-PIV  D-Zosyn     Advance Directives:   Code Status and Medical Interventions:   Ordered at: 03/17/23 0027     Code Status (Patient has no pulse and is not breathing):    CPR (Attempt to Resuscitate)     Medical Interventions (Patient has pulse or is breathing):    Full Support     Release to patient:    Routine Release     High level of risk due to:  severe exacerbation of chronic illness and illness with threat to life or bodily function.    Plan of care and goals reviewed during interdisciplinary rounds.  I discussed the patient's findings and my recommendations with patient    -- Ladarius Verma MD  Pulmonary/Critical Care

## 2023-03-17 NOTE — ED PROVIDER NOTES
Subjective   History of Present Illness  Patient is a pleasant 62-year-old female with a history of chronic kidney disease, coronary artery disease, and low back pain who presents today for altered mental status.  EMS states that the family called for slurred speech and poor responsiveness.  Upon their arrival patient's systolic blood pressure was in the upper 40s.  They started IV fluids of which she has received 800 cc in route and they started low-dose Levophed drip.  They state that the pressure improved and along with that improved pressure her mentation and interaction improved.  She is now alert and conversing well upon arrival to the emergency department.  She states that 3 days ago she had to 3 episodes of diarrhea and since then she has been very weak.  She has been lying in bed a lot sleeping.        Review of Systems    Past Medical History:   Diagnosis Date   • Arthritis    • Chronic kidney disease    • Headache    • Heart disease    • Heart failure (HCC)    • Hypertension    • Low back pain    • Osteoporosis    • Recent urinary tract infection    • Seizures (HCC)        No Known Allergies    Past Surgical History:   Procedure Laterality Date   •  SECTION     • COLONOSCOPY         Family History   Problem Relation Age of Onset   • Arthritis Mother    • Heart disease Mother    • Hypertension Mother    • Diabetes Mother    • Asthma Father    • Heart disease Father    • Hypertension Father    • Kidney disease Father    • Kidney disease Sister        Social History     Socioeconomic History   • Marital status:    Tobacco Use   • Smoking status: Every Day     Packs/day: 1.50     Types: Cigarettes   • Smokeless tobacco: Never   Vaping Use   • Vaping Use: Never used   Substance and Sexual Activity   • Alcohol use: Yes     Comment: Occ wine   • Drug use: Never   • Sexual activity: Defer           Objective   Physical Exam  Vitals and nursing note reviewed.   Constitutional:       Appearance:  She is ill-appearing. She is not diaphoretic.      Comments: Somnolent but arousable    HENT:      Head: Normocephalic and atraumatic.   Eyes:      Extraocular Movements: Extraocular movements intact.      Pupils: Pupils are equal, round, and reactive to light.   Cardiovascular:      Rate and Rhythm: Normal rate and regular rhythm.      Pulses: Normal pulses.      Heart sounds: Normal heart sounds.   Pulmonary:      Effort: Pulmonary effort is normal. No respiratory distress.      Breath sounds: Normal breath sounds. No wheezing or rhonchi.   Abdominal:      General: Bowel sounds are normal. There is no distension.      Palpations: Abdomen is soft.      Tenderness: There is no abdominal tenderness. There is no guarding or rebound.   Musculoskeletal:         General: No swelling, deformity or signs of injury. Normal range of motion.      Cervical back: Normal range of motion.   Skin:     General: Skin is warm.      Capillary Refill: Capillary refill takes less than 2 seconds.   Neurological:      General: No focal deficit present.      Mental Status: She is oriented to person, place, and time.   Psychiatric:         Mood and Affect: Mood normal.         Behavior: Behavior normal.         Thought Content: Thought content normal.         Critical Care  Performed by: Roberth Maza DO  Authorized by: Roberth Maza DO     Critical care provider statement:     Critical care time (minutes):  35    Critical care time was exclusive of:  Separately billable procedures and treating other patients    Critical care was necessary to treat or prevent imminent or life-threatening deterioration of the following conditions:  Shock    Critical care was time spent personally by me on the following activities:  Ordering and performing treatments and interventions, ordering and review of laboratory studies, ordering and review of radiographic studies, pulse oximetry, re-evaluation of patient's condition, obtaining history from patient or  surrogate, examination of patient, evaluation of patient's response to treatment, discussions with consultants and development of treatment plan with patient or surrogate    I assumed direction of critical care for this patient from another provider in my specialty: no      Care discussed with: admitting provider                 ED Course          XR Chest 1 View    Result Date: 3/16/2023  XR CHEST 1 VW Date of Exam: 3/16/2023 9:19 PM EDT Indication: Weak/Dizzy/AMS triage protocol. Comparison: October 15, 2022 Findings: The heart is not definitely enlarged. The lungs seem clear. There are no pleural effusions. There are degenerative changes involving the right shoulder area.     Impression: 1.An acute pulmonary process is not apparent. Electronically Signed: Azeem Howe  3/16/2023 9:33 PM EDT  Workstation ID: [id number]             Vitals:    03/19/23 2300 03/20/23 0310 03/20/23 0525 03/20/23 0710   BP: 126/67 133/79  144/76   BP Location: Right arm Right arm  Right arm   Patient Position: Lying Lying  Lying   Pulse: 69 67  76   Resp: 16 16 16   Temp: 98.3 °F (36.8 °C) 97.7 °F (36.5 °C)  97.6 °F (36.4 °C)   TempSrc: Oral Oral  Oral   SpO2: 94% 96%  94%   Weight:   58.7 kg (129 lb 8 oz)    Height:         Medications   sodium chloride 0.9 % bolus 1,000 mL (0 mL Intravenous Stopped 3/16/23 2233)   piperacillin-tazobactam (ZOSYN) 3.375 g in iso-osmotic dextrose 50 ml (premix) (0 g Intravenous Stopped 3/16/23 2233)   vancomycin 1250 mg/250 mL 0.9% NS IVPB ([facility]) (1,250 mg Intravenous New Bag 3/16/23 2235)   sodium chloride 0.9 % bolus 1,701 mL (0 mL Intravenous Stopped 3/16/23 2328)   magnesium sulfate 2g/50 mL (PREMIX) infusion (2 g Intravenous New Bag 3/17/23 0103)   albumin human 5 % solution 500 mL (500 mL Intravenous New Bag 3/17/23 0257)   potassium chloride (MICRO-K) CR capsule 40 mEq (40 mEq Oral Given 3/17/23 0533)   potassium chloride (MICRO-K) CR capsule 20 mEq (20 mEq Oral Given 3/18/23 2615)   magnesium  sulfate in D5W 1g/100mL (PREMIX) (1 g Intravenous New Bag 3/18/23 0978)   hydrALAZINE (APRESOLINE) injection 10 mg (10 mg Intravenous Given 3/19/23 0219)   magnesium sulfate 4g/100mL (PREMIX) infusion (4 g Intravenous New Bag 3/19/23 6471)     ECG/EMG Results (last 24 hours)     Procedure Component Value Units Date/Time    ECG 12 Lead ED Triage Standing Order; Weak / Dizzy / AMS [[id number]] Collected: 03/16/23 2115     Updated: 03/19/23 1422     QT Interval 484 ms      QTC Interval 514 ms     Narrative:      Test Reason : HYPOTENSIVE  Blood Pressure :   */*   mmHG  Vent. Rate :  68 BPM     Atrial Rate :  68 BPM     P-R Int : 120 ms          QRS Dur :  80 ms      QT Int : 484 ms       P-R-T Axes :   *  62  73 degrees     QTc Int : 514 ms    ** Poor data quality, interpretation may be adversely affected  Normal sinus rhythm  Minimal voltage criteria for LVH, may be normal variant  Prolonged QT  Abnormal ECG  When compared with ECG of 13-SEP-2021 17:17,  QT has lengthened  Confirmed by MD POND CORY ([id number]) on 3/19/2023 2:18:55 PM    Referred By: EDMD           Confirmed By: STEPHEN POND MD    ECG 12 Lead Other; Sepsis [[id number]] Collected: 03/16/23 2151     Updated: 03/19/23 1422     QT Interval 524 ms      QTC Interval 524 ms     Narrative:      Test Reason : Other~  Blood Pressure :   */*   mmHG  Vent. Rate :  60 BPM     Atrial Rate :  60 BPM     P-R Int : 126 ms          QRS Dur :  80 ms      QT Int : 524 ms       P-R-T Axes :  30  64  69 degrees     QTc Int : 524 ms    ** Poor data quality, interpretation may be adversely affected  Normal sinus rhythm  Minimal voltage criteria for LVH, may be normal variant  Prolonged QT  Abnormal ECG  When compared with ECG of 16-MAR-2023 21:15, (Unconfirmed)  No significant change was found  Confirmed by MD POND CORY ([id number]) on 3/19/2023 2:18:58 PM    Referred By: EDMD           Confirmed By: STEPHEN POND MD    SCANNED - TELEMETRY   [[id number]] Resulted: 03/16/23     Updated:  03/20/23 0533    SCANNED - TELEMETRY   [[id number]] Resulted: 03/16/23     Updated: 03/20/23 0533        ECG 12 Lead Other; Sepsis   Final Result   Test Reason : Other~   Blood Pressure :   */*   mmHG   Vent. Rate :  60 BPM     Atrial Rate :  60 BPM      P-R Int : 126 ms          QRS Dur :  80 ms       QT Int : 524 ms       P-R-T Axes :  30  64  69 degrees      QTc Int : 524 ms      ** Poor data quality, interpretation may be adversely affected   Normal sinus rhythm   Minimal voltage criteria for LVH, may be normal variant   Prolonged QT   Abnormal ECG   When compared with ECG of 16-MAR-2023 21:15, (Unconfirmed)   No significant change was found   Confirmed by MD POND CORY ([id number]) on 3/19/2023 2:18:58 PM      Referred By: EDMD           Confirmed By: STEPHEN POND MD      ECG 12 Lead ED Triage Standing Order; Weak / Dizzy / AMS   Final Result   Test Reason : HYPOTENSIVE   Blood Pressure :   */*   mmHG   Vent. Rate :  68 BPM     Atrial Rate :  68 BPM      P-R Int : 120 ms          QRS Dur :  80 ms       QT Int : 484 ms       P-R-T Axes :   *  62  73 degrees      QTc Int : 514 ms      ** Poor data quality, interpretation may be adversely affected   Normal sinus rhythm   Minimal voltage criteria for LVH, may be normal variant   Prolonged QT   Abnormal ECG   When compared with ECG of 13-SEP-2021 17:17,   QT has lengthened   Confirmed by MD POND CORY ([id number]) on 3/19/2023 2:18:55 PM      Referred By: EDMD           Confirmed By: STEPHEN POND MD      SCANNED - TELEMETRY     Final Result      SCANNED - TELEMETRY     Final Result         Latest Reference Range & Units 03/16/23 22:19 03/16/23 23:59 03/17/23 03:05   CO2 Content 22 - 33 mmol/L 28.2     Carboxyhemoglobin Venous % 2.3     Methemoglobin Venous % 0.3     Oxyhemoglobin Venous % 29.5     Hemoglobin, Blood Gas 14 - 18 g/dL 12.2 (L)     Site  Nurse/Dr Draw     Modality  Room Air     FIO2 % 21     Rate Breaths/minute 0     PIP cmH2O 0     IPAP  0     EPAP  0      Barometric Pressure for Blood Gas  See Comment     pH, Venous 7.310 - 7.410 pH Units 7.375     pCO2, Venous 41.0 - 51.0 mm Hg 45.9     pO2, Venous 27.0 - 53.0 mm Hg 18.2 (L)     HCO3, Venous 22.0 - 28.0 mmol/L 26.8     Base Excess -2.0 - 2.0 mmol/L 1.2     HS Troponin T <10 ng/L  34 (H)    Troponin T Delta >=-4 - <+4 ng/L  -5 (L)    Glucose 65 - 99 mg/dL   94   Sodium 136 - 145 mmol/L   136   Potassium 3.5 - 5.2 mmol/L   2.8 (L)   CO2 22.0 - 29.0 mmol/L   24.0   Chloride 98 - 107 mmol/L   101   Anion Gap 5.0 - 15.0 mmol/L   11.0   Creatinine 0.57 - 1.00 mg/dL   2.35 (H)   BUN 8 - 23 mg/dL   23   BUN/Creatinine Ratio 7.0 - 25.0    9.8   Calcium 8.6 - 10.5 mg/dL   7.3 (L)   eGFR >60.0 mL/min/1.73   22.6 (L)   Phosphorus 2.5 - 4.5 mg/dL   4.2   Lactate 0.5 - 2.0 mmol/L  1.6    Magnesium 1.6 - 2.4 mg/dL   2.2   WBC 3.40 - 10.80 10*3/mm3   11.58 (H)   RBC 3.77 - 5.28 10*6/mm3   3.38 (L)   Hemoglobin 12.0 - 15.9 g/dL   10.8 (L)   Hematocrit 34.0 - 46.6 %   32.8 (L)   RDW 12.3 - 15.4 %   13.7   MCV 79.0 - 97.0 fL   97.0   MCH 26.6 - 33.0 pg   32.0   MCHC 31.5 - 35.7 g/dL   32.9   MPV 6.0 - 12.0 fL   9.0   Platelets 140 - 450 10*3/mm3   403   RDW-SD 37.0 - 54.0 fl   49.1   Neutrophil Rel % 42.7 - 76.0 %   66.9   Lymphocyte Rel % 19.6 - 45.3 %   22.5   Monocyte Rel % 5.0 - 12.0 %   9.8   Eosinophil Rel % 0.3 - 6.2 %   0.1 (L)   Basophil Rel % 0.0 - 1.5 %   0.3   Immature Granulocyte Rel % 0.0 - 0.5 %   0.4   Neutrophils Absolute 1.70 - 7.00 10*3/mm3   7.73 (H)   Lymphocytes Absolute 0.70 - 3.10 10*3/mm3   2.61   Monocytes Absolute 0.10 - 0.90 10*3/mm3   1.14 (H)   Eosinophils Absolute 0.00 - 0.40 10*3/mm3   0.01   Basophils Absolute 0.00 - 0.20 10*3/mm3   0.04   Immature Grans, Absolute 0.00 - 0.05 10*3/mm3   0.05   nRBC 0.0 - 0.2 /100 WBC   0.0   (L): Data is abnormally low  (H): Data is abnormally high        Latest Reference Range & Units 03/16/23 21:41   Color, UA Yellow, Straw  Dark Yellow !   Appearance, UA Clear   Turbid !   Specific Gravity, UA 1.001 - 1.030  1.025   pH, UA 5.0 - 8.0  5.5   Glucose Negative  100 mg/dL (Trace) !   Ketones, UA Negative  Negative   Blood, UA Negative  Moderate (2+) !   Nitrite, UA Negative  Negative   Leukocytes, UA Negative  Negative   Protein, UA Negative  >=300 mg/dL (3+) !   Bilirubin, UA Negative  Negative   Urobilinogen, UA 0.2 - 1.0 E.U./dL  1.0 E.U./dL   RBC, UA None Seen, 0-2 /HPF 0-2   WBC, UA None Seen, 0-2 /HPF 3-5 !   Bacteria, UA None Seen, Trace /HPF Trace   Amorphous Crystals, UA None Seen /HPF Moderate/2+   Squamous Epithelial Cells, UA None Seen, 0-2 /HPF 7-12 !   Hyaline Casts, UA 0 - 6 /LPF None Seen   Methodology:  Manual Light Microscopy   !: Data is abnormal                           Medical Decision Making  Dehydration: complicated acute illness or injury  Diarrhea, unspecified type: complicated acute illness or injury  Hypotension, unspecified hypotension type: complicated acute illness or injury  Sepsis without acute organ dysfunction, due to unspecified organism (HCC): complicated acute illness or injury  Amount and/or Complexity of Data Reviewed  Labs: ordered. Decision-making details documented in ED Course.  Radiology: ordered and independent interpretation performed. Decision-making details documented in ED Course.  ECG/medicine tests: ordered and independent interpretation performed. Decision-making details documented in ED Course.  Discussion of management or test interpretation with external provider(s): Pt BP remains labile  Given her significant hypotension, I discussed the case with ICU attending, Dr. Draper.  Pt will be admitted for further evaluation and management.    Risk  Prescription drug management.  Decision regarding hospitalization.          Final diagnoses:   Sepsis without acute organ dysfunction, due to unspecified organism (HCC)   Dehydration   Diarrhea, unspecified type   Hypotension, unspecified hypotension type       ED Disposition  ED  Disposition     ED Disposition   Decision to Admit    Condition   --    Comment   Level of Care: Critical Care [6]   Admitting Physician: PJ STEPHENSON [[id number]]                  Roberth Maza DO  03/30/23 0644

## 2023-03-17 NOTE — PROGRESS NOTES
"                    Clinical Nutrition       Patient Name: Gabriela Waller  YOB: 1960  MRN: [id number]  Date of Encounter: 03/17/23 13:28 EDT  Admission date: 3/16/2023      Reason for Visit   MDR, MST score 2+, Unintended wt loss    EMR Reviewed   Pertinent Medical Data Reviewed: yes     Admission Diagnosis:  Hypotension due to drugs [I95.2]    Problem List:    Hypotension due to drugs    Chronic kidney disease    Hypertension    Seizures (HCC)      Anthropometric      Admission Height 152.4 cm (60\") Documented at 03/16/2023 2109   Admission Weight 56.7 kg (125 lb) Documented at 03/16/2023 2109     Last Filed Weight: Weight: 58.5 kg (128 lb 15.5 oz) (03/17/23 0306)  Weight Method: Bed scale  BMI: BMI (Calculated): 25.2  BMI classification: Overweight: 25.0-29.9kg/m2      Weight Weight (kg) Weight (lbs) Weight Method VISIT REPORT   3/17/2023 58.5 kg 128 lb 15.5 oz - -   3/16/2023 57.6 kg 126 lb 15.8 oz Bed scale -   3/16/2023 56.7 kg 125 lb Stated -   10/15/2022 56.7 kg 125 lb Bed scale -   10/15/2022 63.4 kg 139 lb 12.4 oz (No Data)  -     UBW:  137 lbs  Weight change: 7% wt loss 8 % wt loss since October over 5 months+ does not meet criterion for malnutrition, there may also be volume loss w/ diarrheal illness.     Reported/Observed/Food/Nutrition Related - Comments   RN reports pt adm w/ hypotension likely r/t medication - she is off levo, may have taken too many home meds    Pt reports not feeling well over past week/month is also having diarrhea. She believes she has lost 15-16 lbs this month.      Current Nutrition Prescription     Diet: Regular/House Diet; Texture: Regular Texture (IDDSI 7); Fluid Consistency: Thin (IDDSI 0)  No active supplement orders      Average Intake from Charting: Insufficient data 100% of snack     Nutrition Diagnosis   3/17  Problem Altered GI function   Etiology GI illness   Signs/Symptoms diarrhea   Status: improving per pt report    Actions     Follow treatment " progress, Care plan reviewed, Advise alternate selection, Menu provided, Encourage intake    Monitor Per Protocol      Neetu Salvador MS,RD,LD,   Time Spent: 20 mins

## 2023-03-18 ENCOUNTER — APPOINTMENT (OUTPATIENT)
Dept: CARDIOLOGY | Facility: HOSPITAL | Age: 63
DRG: 872 | End: 2023-03-18
Payer: MEDICARE

## 2023-03-18 PROBLEM — N17.9 AKI (ACUTE KIDNEY INJURY): Status: ACTIVE | Noted: 2023-03-18

## 2023-03-18 LAB
ANION GAP SERPL CALCULATED.3IONS-SCNC: 10 MMOL/L (ref 5–15)
BH CV ECHO MEAS - AO MAX PG: 15.7 MMHG
BH CV ECHO MEAS - AO MEAN PG: 7.5 MMHG
BH CV ECHO MEAS - AO ROOT DIAM: 2.8 CM
BH CV ECHO MEAS - AO V2 MAX: 198 CM/SEC
BH CV ECHO MEAS - AO V2 VTI: 39.6 CM
BH CV ECHO MEAS - AVA(I,D): 2.4 CM2
BH CV ECHO MEAS - EDV(CUBED): 74.1 ML
BH CV ECHO MEAS - EDV(MOD-SP2): 63.5 ML
BH CV ECHO MEAS - EDV(MOD-SP4): 68.5 ML
BH CV ECHO MEAS - EF(MOD-BP): 60.3 %
BH CV ECHO MEAS - EF(MOD-SP2): 62.4 %
BH CV ECHO MEAS - EF(MOD-SP4): 55.3 %
BH CV ECHO MEAS - ESV(CUBED): 19.7 ML
BH CV ECHO MEAS - ESV(MOD-SP2): 23.9 ML
BH CV ECHO MEAS - ESV(MOD-SP4): 30.6 ML
BH CV ECHO MEAS - FS: 35.7 %
BH CV ECHO MEAS - IVS/LVPW: 1 CM
BH CV ECHO MEAS - IVSD: 1 CM
BH CV ECHO MEAS - LA DIMENSION: 3.5 CM
BH CV ECHO MEAS - LAT PEAK E' VEL: 12.3 CM/SEC
BH CV ECHO MEAS - LV MASS(C)D: 137.2 GRAMS
BH CV ECHO MEAS - LV MAX PG: 7.8 MMHG
BH CV ECHO MEAS - LV MEAN PG: 3 MMHG
BH CV ECHO MEAS - LV V1 MAX: 140 CM/SEC
BH CV ECHO MEAS - LV V1 VTI: 30.3 CM
BH CV ECHO MEAS - LVIDD: 4.2 CM
BH CV ECHO MEAS - LVIDS: 2.7 CM
BH CV ECHO MEAS - LVOT AREA: 3.1 CM2
BH CV ECHO MEAS - LVOT DIAM: 2 CM
BH CV ECHO MEAS - LVPWD: 1 CM
BH CV ECHO MEAS - MED PEAK E' VEL: 12.5 CM/SEC
BH CV ECHO MEAS - MV A MAX VEL: 117 CM/SEC
BH CV ECHO MEAS - MV DEC TIME: 0.31 MSEC
BH CV ECHO MEAS - MV E MAX VEL: 116 CM/SEC
BH CV ECHO MEAS - MV E/A: 0.99
BH CV ECHO MEAS - MV MAX PG: 7.2 MMHG
BH CV ECHO MEAS - MV MEAN PG: 3 MMHG
BH CV ECHO MEAS - MV V2 VTI: 42.9 CM
BH CV ECHO MEAS - MVA(VTI): 2.22 CM2
BH CV ECHO MEAS - PA ACC TIME: 0.16 SEC
BH CV ECHO MEAS - PA PR(ACCEL): 6.1 MMHG
BH CV ECHO MEAS - PA V2 MAX: 141 CM/SEC
BH CV ECHO MEAS - RAP SYSTOLE: 3 MMHG
BH CV ECHO MEAS - RVSP: 24 MMHG
BH CV ECHO MEAS - SV(LVOT): 95.2 ML
BH CV ECHO MEAS - SV(MOD-SP2): 39.6 ML
BH CV ECHO MEAS - SV(MOD-SP4): 37.9 ML
BH CV ECHO MEAS - TAPSE (>1.6): 2.13 CM
BH CV ECHO MEAS - TR MAX PG: 17 MMHG
BH CV ECHO MEAS - TR MAX VEL: 204.3 CM/SEC
BH CV ECHO MEASUREMENTS AVERAGE E/E' RATIO: 9.35
BH CV VAS BP LEFT ARM: NORMAL MMHG
BH CV XLRA - RV BASE: 3.2 CM
BH CV XLRA - RV LENGTH: 5.6 CM
BH CV XLRA - RV MID: 2.3 CM
BH CV XLRA - TDI S': 12.8 CM/SEC
BUN SERPL-MCNC: 22 MG/DL (ref 8–23)
BUN/CREAT SERPL: 12.6 (ref 7–25)
CALCIUM SPEC-SCNC: 7.9 MG/DL (ref 8.6–10.5)
CHLORIDE SERPL-SCNC: 110 MMOL/L (ref 98–107)
CO2 SERPL-SCNC: 20 MMOL/L (ref 22–29)
CREAT SERPL-MCNC: 1.74 MG/DL (ref 0.57–1)
DEPRECATED RDW RBC AUTO: 48.8 FL (ref 37–54)
EGFRCR SERPLBLD CKD-EPI 2021: 32.4 ML/MIN/1.73
ERYTHROCYTE [DISTWIDTH] IN BLOOD BY AUTOMATED COUNT: 13.7 % (ref 12.3–15.4)
GLUCOSE SERPL-MCNC: 72 MG/DL (ref 65–99)
HCT VFR BLD AUTO: 31 % (ref 34–46.6)
HGB BLD-MCNC: 10.1 G/DL (ref 12–15.9)
LEFT ATRIUM VOLUME INDEX: 40.7 ML/M2
MAGNESIUM SERPL-MCNC: 1.8 MG/DL (ref 1.6–2.4)
MAXIMAL PREDICTED HEART RATE: 156 BPM
MCH RBC QN AUTO: 31.7 PG (ref 26.6–33)
MCHC RBC AUTO-ENTMCNC: 32.6 G/DL (ref 31.5–35.7)
MCV RBC AUTO: 97.2 FL (ref 79–97)
PLATELET # BLD AUTO: 356 10*3/MM3 (ref 140–450)
PMV BLD AUTO: 9.2 FL (ref 6–12)
POTASSIUM SERPL-SCNC: 3.5 MMOL/L (ref 3.5–5.2)
RBC # BLD AUTO: 3.19 10*6/MM3 (ref 3.77–5.28)
SODIUM SERPL-SCNC: 140 MMOL/L (ref 136–145)
STRESS TARGET HR: 133 BPM
WBC NRBC COR # BLD: 8.57 10*3/MM3 (ref 3.4–10.8)

## 2023-03-18 PROCEDURE — 97162 PT EVAL MOD COMPLEX 30 MIN: CPT

## 2023-03-18 PROCEDURE — 25010000002 HEPARIN (PORCINE) PER 1000 UNITS: Performed by: INTERNAL MEDICINE

## 2023-03-18 PROCEDURE — 80048 BASIC METABOLIC PNL TOTAL CA: CPT | Performed by: INTERNAL MEDICINE

## 2023-03-18 PROCEDURE — 99233 SBSQ HOSP IP/OBS HIGH 50: CPT | Performed by: INTERNAL MEDICINE

## 2023-03-18 PROCEDURE — 25010000002 PIPERACILLIN SOD-TAZOBACTAM PER 1 G: Performed by: INTERNAL MEDICINE

## 2023-03-18 PROCEDURE — 85027 COMPLETE CBC AUTOMATED: CPT | Performed by: INTERNAL MEDICINE

## 2023-03-18 PROCEDURE — 25010000002 MAGNESIUM SULFATE IN D5W 1G/100ML (PREMIX) 1-5 GM/100ML-% SOLUTION: Performed by: NURSE PRACTITIONER

## 2023-03-18 PROCEDURE — 83735 ASSAY OF MAGNESIUM: CPT | Performed by: INTERNAL MEDICINE

## 2023-03-18 PROCEDURE — 97165 OT EVAL LOW COMPLEX 30 MIN: CPT

## 2023-03-18 PROCEDURE — 93306 TTE W/DOPPLER COMPLETE: CPT

## 2023-03-18 PROCEDURE — 93306 TTE W/DOPPLER COMPLETE: CPT | Performed by: INTERNAL MEDICINE

## 2023-03-18 RX ORDER — MAGNESIUM SULFATE 1 G/100ML
1 INJECTION INTRAVENOUS ONCE
Status: COMPLETED | OUTPATIENT
Start: 2023-03-18 | End: 2023-03-18

## 2023-03-18 RX ORDER — LOSARTAN POTASSIUM 50 MG/1
50 TABLET ORAL
Status: DISCONTINUED | OUTPATIENT
Start: 2023-03-18 | End: 2023-03-18

## 2023-03-18 RX ORDER — POTASSIUM CHLORIDE 750 MG/1
20 CAPSULE, EXTENDED RELEASE ORAL ONCE
Status: COMPLETED | OUTPATIENT
Start: 2023-03-18 | End: 2023-03-18

## 2023-03-18 RX ADMIN — MAGNESIUM SULFATE HEPTAHYDRATE 1 G: 1 INJECTION, SOLUTION INTRAVENOUS at 06:15

## 2023-03-18 RX ADMIN — AMLODIPINE BESYLATE 5 MG: 5 TABLET ORAL at 08:37

## 2023-03-18 RX ADMIN — ASPIRIN 81 MG: 81 TABLET, COATED ORAL at 08:37

## 2023-03-18 RX ADMIN — Medication 10 ML: at 08:37

## 2023-03-18 RX ADMIN — HEPARIN SODIUM 5000 UNITS: 5000 INJECTION INTRAVENOUS; SUBCUTANEOUS at 21:33

## 2023-03-18 RX ADMIN — POTASSIUM CHLORIDE 10 MEQ: 10 CAPSULE, COATED, EXTENDED RELEASE ORAL at 08:37

## 2023-03-18 RX ADMIN — GABAPENTIN 200 MG: 100 CAPSULE ORAL at 08:37

## 2023-03-18 RX ADMIN — TAZOBACTAM SODIUM AND PIPERACILLIN SODIUM 3.38 G: 375; 3 INJECTION, SOLUTION INTRAVENOUS at 14:02

## 2023-03-18 RX ADMIN — METOPROLOL TARTRATE 6.25 MG: 25 TABLET, FILM COATED ORAL at 20:41

## 2023-03-18 RX ADMIN — LEVETIRACETAM 500 MG: 500 TABLET, FILM COATED ORAL at 08:37

## 2023-03-18 RX ADMIN — LEVETIRACETAM 500 MG: 500 TABLET, FILM COATED ORAL at 20:41

## 2023-03-18 RX ADMIN — GABAPENTIN 200 MG: 100 CAPSULE ORAL at 16:00

## 2023-03-18 RX ADMIN — GABAPENTIN 200 MG: 100 CAPSULE ORAL at 20:41

## 2023-03-18 RX ADMIN — POTASSIUM CHLORIDE 20 MEQ: 10 CAPSULE, COATED, EXTENDED RELEASE ORAL at 06:15

## 2023-03-18 RX ADMIN — HEPARIN SODIUM 5000 UNITS: 5000 INJECTION INTRAVENOUS; SUBCUTANEOUS at 06:15

## 2023-03-18 RX ADMIN — SODIUM CHLORIDE 100 ML/HR: 9 INJECTION, SOLUTION INTRAVENOUS at 18:17

## 2023-03-18 RX ADMIN — PANTOPRAZOLE SODIUM 40 MG: 40 TABLET, DELAYED RELEASE ORAL at 06:15

## 2023-03-18 RX ADMIN — BUSPIRONE HYDROCHLORIDE 5 MG: 10 TABLET ORAL at 20:41

## 2023-03-18 RX ADMIN — ESCITALOPRAM OXALATE 20 MG: 20 TABLET ORAL at 08:37

## 2023-03-18 RX ADMIN — HYDROCODONE BITARTRATE AND ACETAMINOPHEN 1 TABLET: 5; 325 TABLET ORAL at 08:37

## 2023-03-18 RX ADMIN — HEPARIN SODIUM 5000 UNITS: 5000 INJECTION INTRAVENOUS; SUBCUTANEOUS at 14:01

## 2023-03-18 RX ADMIN — CLOPIDOGREL BISULFATE 75 MG: 75 TABLET ORAL at 08:37

## 2023-03-18 RX ADMIN — TAZOBACTAM SODIUM AND PIPERACILLIN SODIUM 3.38 G: 375; 3 INJECTION, SOLUTION INTRAVENOUS at 06:15

## 2023-03-18 RX ADMIN — HYDROCODONE BITARTRATE AND ACETAMINOPHEN 1 TABLET: 5; 325 TABLET ORAL at 16:00

## 2023-03-18 RX ADMIN — ATORVASTATIN CALCIUM 80 MG: 40 TABLET, FILM COATED ORAL at 20:41

## 2023-03-18 RX ADMIN — BUSPIRONE HYDROCHLORIDE 5 MG: 10 TABLET ORAL at 08:37

## 2023-03-18 RX ADMIN — SODIUM CHLORIDE 100 ML/HR: 9 INJECTION, SOLUTION INTRAVENOUS at 08:36

## 2023-03-18 RX ADMIN — Medication 1 PATCH: at 08:36

## 2023-03-18 NOTE — PROGRESS NOTES
"INTENSIVIST   PROGRESS NOTE     Hospital:  LOS: 2 days     Chief Complaint: Hypotension    Subjective   S     Interval History: No acute events since admission.  Blood pressure stable and still off pressors.  Patient had two loose bowel movements yesterday but none overnight and this morning.  She reports that abdominal pain is improving and denies tenderness.    The patient's relevant past medical, surgical and social history were reviewed and updated in Epic as appropriate.      ROS: Back pain, malaise     Objective   O     Intake/Ouptut 24 hrs (7:00AM - 6:59 AM)  Intake & Output (last 3 days)       03/15 0701 03/16 0700 03/16 0701 03/17 0700 03/17 0701 03/18 0700 03/18 0701 03/19 0700    P.O.  240 480 500    I.V. (mL/kg)   4384.2 (74.9) 953.5 (16.3)    IV Piggyback  2751 309.1 23.9    Total Intake(mL/kg)  2991 (51.1) 5173.3 (88.4) 1477.4 (25.3)    Urine (mL/kg/hr)  550 2000 (1.4) 1250 (2)    Stool   0     Total Output  550 2000 1250    Net  +2441 +3173.3 +227.4            Urine Unmeasured Occurrence    1 x    Stool Unmeasured Occurrence   1 x         Medications (drips):  sodium chloride, Last Rate: 100 mL/hr (03/18/23 0836)    Respiratory Support: Room air     Physical Examination:  Vital Signs: Blood pressure 149/82, pulse 71, temperature 97.8 °F (36.6 °C), temperature source Oral, resp. rate 18, height 152.4 cm (60\"), weight 58.5 kg (128 lb 15.5 oz), SpO2 99 %.    General: The patient appears in no acute distress. Alert, cooperative and interactive.  HEENT:NC/AT, PERRL, Normal nasal mucosa.   Neck: Trachea midline, No masses.  Chest: Clear to auscultation bilaterally, No wheezing, rhonchi, or rales. Normal work of breathing. Equal chest rise.  Cardiac: Regular rhythm, normal rate, S1S2 auscultated. No murmurs, rubs or gallops.   Abdomen: Soft.  Mild tenderness in right lower quadrant. Non-distended, positive bowel sounds.   Extremities: No lower extremity edema. No clubbing or cyanosis.  Neuro: Motor power " grossly intact bilaterally. Sensation intact. Speech fluid and fluent. Thought process coherent.  Psych: Alert and oriented x3. Mood stable.    Lines, Drains & Airways     Active LDAs     Name Placement date Placement time Site Days    Peripheral IV 10/16/22 0000 Anterior;Distal;Left;Upper Arm 10/16/22  0000  Arm  152    Peripheral IV 03/16/23 2105 Left Forearm 03/16/23 2105  Forearm  less than 1    Peripheral IV 03/16/23 2106 Right Antecubital 03/16/23 2106  Antecubital  less than 1    External Urinary Catheter 10/16/22  0255  --  152    External Urinary Catheter 03/17/23  0000  --  less than 1              Results from last 7 days   Lab Units 03/18/23  0351 03/17/23  0305 03/16/23 2115   WBC 10*3/mm3 8.57 11.58* 16.24*   HEMOGLOBIN g/dL 10.1* 10.8* 13.1   MCV fL 97.2* 97.0 94.6   PLATELETS 10*3/mm3 356 403 492*     Results from last 7 days   Lab Units 03/18/23  0351 03/17/23  0756 03/17/23 0305 03/16/23 2115   SODIUM mmol/L 140  --  136 132*   POTASSIUM mmol/L 3.5 3.3* 2.8* 3.4*   CO2 mmol/L 20.0*  --  24.0 27.0   CREATININE mg/dL 1.74*  --  2.35* 2.44*   GLUCOSE mg/dL 72  --  94 114*   MAGNESIUM mg/dL 1.8  --  2.2 1.6   PHOSPHORUS mg/dL  --   --  4.2  --      Estimated Creatinine Clearance: 26.1 mL/min (A) (by C-G formula based on SCr of 1.74 mg/dL (H)).  Results from last 7 days   Lab Units 03/16/23 2115   ALK PHOS U/L 77   BILIRUBIN mg/dL 0.4   ALT (SGPT) U/L 9   AST (SGOT) U/L 18       Results from last 7 days   Lab Units 03/16/23  2219   FIO2 % 21     CXR (3/16/2023):  Radiology Impression:   An acute pulmonary process is not apparent.     Results: Reviewed.    I reviewed the patient's new laboratory and imaging results.  I independently reviewed the patient's new images.    Medications: Reviewed.    Assessment & Plan   A / P     Active Hospital Problems:  Active Hospital Problems    Diagnosis  POA   • **Hypotension due to drugs [I95.2]  Yes   • BOBY (acute kidney injury) (HCC) [N17.9]  Unknown   •  Essential hypertension [I10]  Unknown   • Seizures (HCC) [R56.9]  Yes      Resolved Hospital Problems    Diagnosis Date Resolved POA   • Chronic kidney disease [N18.9] 03/18/2023 Yes     Patient Christin is a 62-year-old female with past medical history of CKD, CAD, hypertension, dyslipidemia and seizure disorder.  She was admitted to the ICU on 3/16/2023 with hypotension in the setting of 2-3x days of diarrhea, malaise and vague abdominal pain.  Patient was fluid resuscitated and placed on vasopressor support.  Septic work-up initiated.    -Hypotension likely multifactorial in the setting of volume loss from diarrhea/possible GI illness and multiple antihypertensives + others that affect hemodynamics (i.e. tizanidine, trazodone, opioids). Off vasopressors 3/17. Patient placed on broad-spectrum antibiotics initially.  Discontinued vancomycin on 3/17.  Cultures are no growth to date.  WBC normal. UA not consistent with infection.  Likely the etiology of illness viral gastroenteritis given constellation of symptoms (ie diarrhea, vague abdominal pain).  Now improved.  GI PCR ordered but patient stopped having loose stools on 3/18 and sample not sent.  Will discontinue Zosyn with low threshold to add back in the setting of clinical worsening  -Given improved hemodynamics, restarted amlodipine (< home dose) and Norco (< home dose).  Given associated CAD history will next restart beta-blocker.  Home medication list shows that she is on metoprolol succinate XL 50 mg daily; will restart beta-blockade with metoprolol tartrate 6.25 BID  -TSH, cortisol, ethanol, UDS, lactate WNL. Cultures no growth to date. Respiratory viral panel negative. Troponin elevated but likely in the setting of hypotension; no delta  -Will continue Keppra for known seizure disorder.  Restart other home medications as able  -BOBY improving. Following I/O. Cr 2.44 on admission, 1.74 this AM  -Telemetry orders placed     F-PO  A-NA  S-NA  T-Heparin  SQ  H-Head of bed greater than 30 degrees  U-PPI  G-FSBS per unit protocol, correction dose insulin  S-NA  B-Will monitor daily and provide regimen if indicated  I-PIV  D-Zosyn; DC on 3/18    Advance Directives:   Code Status and Medical Interventions:   Ordered at: 03/17/23 0027     Code Status (Patient has no pulse and is not breathing):    CPR (Attempt to Resuscitate)     Medical Interventions (Patient has pulse or is breathing):    Full Support     Release to patient:    Routine Release     High level of risk due to:  severe exacerbation of chronic illness and illness with threat to life or bodily function.    Plan of care and goals reviewed during interdisciplinary rounds.  I discussed the patient's findings and my recommendations with patient    -- Ladarius Verma MD  Pulmonary/Critical Care

## 2023-03-18 NOTE — THERAPY DISCHARGE NOTE
Acute Care - Occupational Therapy Discharge  [facility]    Patient Name: Gabriela Waller  : 1960    MRN: [id number]                              Today's Date: 3/18/2023       Admit Date: 3/16/2023    Visit Dx:     ICD-10-CM ICD-9-CM   1. Sepsis without acute organ dysfunction, due to unspecified organism (HCC)  A41.9 038.9     995.91   2. Dehydration  E86.0 276.51   3. Diarrhea, unspecified type  R19.7 787.91   4. Hypotension, unspecified hypotension type  I95.9 458.9     Patient Active Problem List   Diagnosis   • Degenerative disc disease, lumbar   • Degenerative disc disease, cervical   • Cervical disc herniation   • Chronic midline low back pain with left-sided sciatica   • Heart disease   • Chronic kidney disease   • Heart failure (HCC)   • Hypertension   • Osteoporosis   • Seizures (HCC)   • COPD (chronic obstructive pulmonary disease) ([facility])   • Hyperlipidemia   • Sleep apnea   • Hypotension due to drugs     Past Medical History:   Diagnosis Date   • Arthritis    • Chronic kidney disease    • Headache    • Heart disease    • Heart failure (HCC)    • Hypertension    • Low back pain    • Osteoporosis    • Recent urinary tract infection    • Seizures (HCC)      Past Surgical History:   Procedure Laterality Date   •  SECTION     • COLONOSCOPY        General Information     Row Name 23 1117          OT Time and Intention    Document Type discharge evaluation/summary  -CS     Mode of Treatment occupational therapy  -CS     Row Name 23 1117          General Information    Patient Profile Reviewed yes  -CS     Prior Level of Function independent:;all household mobility;ADL's;using stairs  Pt reports use of SPC for mobility, otherwise Ind for ADL completion.  -CS     Existing Precautions/Restrictions fall  -CS     Barriers to Rehab none identified  -CS     Row Name 23 1117          Living Environment    People in Home child(sangita), dependent  -CS     Row Name 23 1117           Home Main Entrance    Number of Stairs, Main Entrance none  -CS     Row Name 03/18/23 1117          Stairs Within Home, Primary    Stairs, Within Home, Primary 5 + 12 (5 to reach kitchen, 12 to reach bathroom)  -CS     Number of Stairs, Within Home, Primary other (see comments);five;twelve  -CS     Stair Railings, Within Home, Primary railing on left side (ascending)  -     Row Name 03/18/23 1117          Cognition    Orientation Status (Cognition) oriented x 4  -CS     Row Name 03/18/23 1117          Safety Issues, Functional Mobility    Safety Issues Affecting Function (Mobility) insight into deficits/self-awareness  -     Impairments Affecting Function (Mobility) endurance/activity tolerance;balance  -CS     Comment, Safety Issues/Impairments (Mobility) dynamic standing balance deficit, SPC in room  -           User Key  (r) = Recorded By, (t) = Taken By, (c) = Cosigned By    Initials Name Provider Type     Avelino Velásquez OT Occupational Therapist               Mobility/ADL's     Row Name 03/18/23 1119          Bed Mobility    Comment, (Bed Mobility) UIC upon arrival, returne to chair  -     Row Name 03/18/23 1119          Transfers    Transfers sit-stand transfer;stand-sit transfer  -     Comment, (Transfers) mild dizziness w/ stable BP  -     Row Name 03/18/23 1119          Sit-Stand Transfer    Sit-Stand [place] (Transfers) standby assist  -     Assistive Device (Sit-Stand Transfers) cane, straight  -     Row Name 03/18/23 1119          Stand-Sit Transfer    Stand-Sit [place] (Transfers) standby assist  -     Assistive Device (Stand-Sit Transfers) cane, straight  -     Row Name 03/18/23 1119          Functional Mobility    Functional Mobility- Comment defer to PT for specifics, SBA for in-room distance w/ SPC to sinkside and return to chair  -     Row Name 03/18/23 1119          Activities of Daily Living    BADL Assessment/Intervention lower body  dressing;grooming;toileting  -CS     Row Name 03/18/23 1119          Lower Body Dressing Assessment/Training    [place] Level (Lower Body Dressing) don;pants/bottoms;standby assist  -     Position (Lower Body Dressing) unsupported sitting;supported standing  -CS     Comment, (Lower Body Dressing) SBA for dynamic sit-stand balance, reach to distal BLEs intact  -     Row Name 03/18/23 1119          Grooming Assessment/Training    [place] Level (Grooming) hair care, combing/brushing;wash face, hands;supervision;oral care regimen  -CS     Position (Grooming) sink side  -CS     Comment, (Grooming) appropriate sequencing intact, performed with no assist, Sup for balance sinskide  -     Row Name 03/18/23 1119          Toileting Assessment/Training    [place] Level (Toileting) toileting skills;independent  -           User Key  (r) = Recorded By, (t) = Taken By, (c) = Cosigned By    Initials Name Provider Type     Avelino Velásquez OT Occupational Therapist               Obj/Interventions     [facility] Name 03/18/23 1121          Sensory Assessment (Somatosensory)    Sensory Assessment (Somatosensory) UE sensation intact  -[facility] Name 03/18/23 1121          Vision Assessment/Intervention    Visual Impairment/Limitations WFL  -[facility] Name 03/18/23 1121          Range of Motion Comprehensive    General Range of Motion bilateral upper extremity ROM WFL  -     Comment, General Range of Motion BUE grossly 4+/5, no significant asymmetry, WFL for safe ADL completion  -     Row Name 03/18/23 1121          Strength Comprehensive (MMT)    General Manual Muscle Testing (MMT) Assessment upper extremity strength deficits identified  -     Row Name 03/18/23 1121          Motor Skills    Motor Skills coordination;functional endurance  -     Coordination bimanual skills;WFL  -     Functional Endurance O2 sats stable on RA  -     Row Name 03/18/23 1121          Balance    Balance Assessment sitting  static balance;sitting dynamic balance;standing static balance;standing dynamic balance  -     Static Sitting Balance independent  -     Dynamic Sitting Balance supervision  -     Position, Sitting Balance unsupported;sitting in chair  -     Static Standing Balance supervision  -     Dynamic Standing Balance contact guard  -     Position/Device Used, Standing Balance supported;cane, straight  -CS     Balance Interventions sitting;sit to stand;standing;occupation based/functional task  -           User Key  (r) = Recorded By, (t) = Taken By, (c) = Cosigned By    Initials Name Provider Type     Avelino Velásquez, OT Occupational Therapist               Goals/Plan    No documentation.                Clinical Impression     Row Name 03/18/23 1122          Pain Assessment    Pretreatment Pain Rating 0/10 - no pain  -     Posttreatment Pain Rating 0/10 - no pain  -     Pre/Posttreatment Pain Comment intermittent pain reported w/ R hip/low back, chronic, no pain at rest  -     Pain Intervention(s) Repositioned;Ambulation/increased activity  -     Row Name 03/18/23 1122          Plan of Care Review    Plan of Care Reviewed With patient  -     Progress no change  -     Outcome Evaluation Pt presents at/near baseline for ADL completion, does not warrant skilled IP OT services at this time. Mild dynamic standing balance deficit observed, defer to PT for mobilty needs and readiness for stairs prior to DC. Mild dizziness with positional changes, BP stable. Rec d/c to home w/ assist.  -     Row Name 03/18/23 1122          Therapy Assessment/Plan (OT)    Criteria for Skilled Therapeutic Interventions Met (OT) no;does not meet criteria for skilled intervention  -     Therapy Frequency (OT) evaluation only  -     Row Name 03/18/23 1122          Therapy Plan Review/Discharge Plan (OT)    Anticipated Discharge Disposition (OT) home with assist  -     Row Name 03/18/23 1122          Vital Signs    Pre  Systolic BP Rehab 153  RN cleared for eval, VSS on RA  -CS     Pre Treatment Diastolic   -CS     Intra Systolic BP Rehab 151  -CS     Intra Treatment Diastolic BP 89  -CS     Post Systolic BP Rehab 154  -CS     Post Treatment Diastolic BP 88  -CS     Posttreatment Heart Rate (beats/min) 73  -CS     O2 Delivery Pre Treatment room air  -CS     O2 Delivery Intra Treatment room air  -CS     Post SpO2 (%) 99  -CS     O2 Delivery Post Treatment room air  -CS     Pre Patient Position Sitting  -CS     Intra Patient Position Standing  -CS     Post Patient Position Sitting  -CS     Row Name 03/18/23 1122          Positioning and Restraints    Pre-Treatment Position sitting in chair/recliner  -CS     Post Treatment Position chair  -CS     In Chair sitting;with PT  -CS           User Key  (r) = Recorded By, (t) = Taken By, (c) = Cosigned By    Initials Name Provider Type    Avelino Qiu OT Occupational Therapist               Outcome Measures     Row Name 03/18/23 1126          How much help from another is currently needed...    Putting on and taking off regular lower body clothing? 3  -CS     Bathing (including washing, rinsing, and drying) 3  -CS     Toileting (which includes using toilet bed pan or urinal) 4  -CS     Putting on and taking off regular upper body clothing 4  -CS     Taking care of personal grooming (such as brushing teeth) 4  -CS     Eating meals 4  -CS     AM-PAC 6 Clicks Score (OT) 22  -CS     Row Name 03/18/23 1126          Functional Assessment    Outcome Measure Options AM-PAC 6 Clicks Daily Activity (OT)  -CS           User Key  (r) = Recorded By, (t) = Taken By, (c) = Cosigned By    Initials Name Provider Type    Avelino Qiu OT Occupational Therapist              Occupational Therapy Education     Title: PT OT SLP Therapies (In Progress)     Topic: Occupational Therapy (In Progress)     Point: ADL training (Done)     Description:   Instruct learner(s) on proper safety adaptation  and remediation techniques during self care or transfers.   Instruct in proper use of assistive devices.              Learning Progress Summary           Patient Acceptance, E,D, VU,DU by  at 3/18/2023 1128                   Point: Home exercise program (Not Started)     Description:   Instruct learner(s) on appropriate technique for monitoring, assisting and/or progressing therapeutic exercises/activities.              Learner Progress:  Not documented in this visit.          Point: Precautions (Done)     Description:   Instruct learner(s) on prescribed precautions during self-care and functional transfers.              Learning Progress Summary           Patient Acceptance, E,D, VU,DU by  at 3/18/2023 1128                   Point: Body mechanics (Done)     Description:   Instruct learner(s) on proper positioning and spine alignment during self-care, functional mobility activities and/or exercises.              Learning Progress Summary           Patient Acceptance, E,D, VU,DU by  at 3/18/2023 1128                               User Key     Initials Effective Dates Name Provider Type Discipline     06/16/21 -  Avelino Velásquez OT Occupational Therapist OT              OT Recommendation and Plan  Therapy Frequency (OT): evaluation only  Plan of Care Review  Plan of Care Reviewed With: patient  Progress: no change  Outcome Evaluation: Pt presents at/near baseline for ADL completion, does not warrant skilled IP OT services at this time. Mild dynamic standing balance deficit observed, defer to PT for mobilty needs and readiness for stairs prior to DC. Mild dizziness with positional changes, BP stable. Rec d/c to home w/ assist.  Plan of Care Reviewed With: patient  Outcome Evaluation: Pt presents at/near baseline for ADL completion, does not warrant skilled IP OT services at this time. Mild dynamic standing balance deficit observed, defer to PT for mobilty needs and readiness for stairs prior to DC. Mild  dizziness with positional changes, BP stable. Rec d/c to home w/ assist.     Time Calculation:    Time Calculation- OT     Row Name 03/18/23 1128             Time Calculation- OT    OT Start Time 0905  -CS      OT Received On 03/18/23  -CS         Untimed Charges    OT Eval/Re-eval Minutes 40  -CS         Total Minutes    Untimed Charges Total Minutes 40  -CS       Total Minutes 40  -CS            User Key  (r) = Recorded By, (t) = Taken By, (c) = Cosigned By    Initials Name Provider Type    CS Avelino Velásquez OT Occupational Therapist              Therapy Charges for Today     Code Description Service Date Service Provider Modifiers Qty    [id number] HC OT EVAL LOW COMPLEXITY 3 3/18/2023 Avelino Velásquez OT GO 1             OT Discharge Summary  Anticipated Discharge Disposition (OT): home with assist  Reason for Discharge: At baseline function, other (comment) (for ADLs)  Discharge Destination: Home with assist    Avelino Velásquez OT  3/18/2023

## 2023-03-18 NOTE — PLAN OF CARE
Goal Outcome Evaluation:  Plan of Care Reviewed With: patient        Progress: no change  Outcome Evaluation: PT initial eval completed. Pt limited by mild balance and endurance deficits compared to baseline. Pt ambulated 250ft with SPC and min A progressing to CGA with continued mobility. Rec continued skilled PT to increase indep with mobility. SPC administered. Rec stair training next session. d/c rec for home with assist once appropriate.

## 2023-03-18 NOTE — PLAN OF CARE
Problem: Adult Inpatient Plan of Care  Goal: Plan of Care Review  Recent Flowsheet Documentation  Taken 3/18/2023 1122 by Avelino Velásquez, OT  Progress: no change  Plan of Care Reviewed With: patient  Outcome Evaluation: Pt presents at/near baseline for ADL completion, does not warrant skilled IP OT services at this time. Mild dynamic standing balance deficit observed, defer to PT for mobilty needs and readiness for stairs prior to DC. Mild dizziness with positional changes, BP stable. Rec d/c to home w/ assist.

## 2023-03-18 NOTE — THERAPY EVALUATION
Patient Name: Gabriela Waller  : 1960    MRN: [id number]                              Today's Date: 3/18/2023       Admit Date: 3/16/2023    Visit Dx:     ICD-10-CM ICD-9-CM   1. Sepsis without acute organ dysfunction, due to unspecified organism (HCC)  A41.9 038.9     995.91   2. Dehydration  E86.0 276.51   3. Diarrhea, unspecified type  R19.7 787.91   4. Hypotension, unspecified hypotension type  I95.9 458.9     Patient Active Problem List   Diagnosis   • Degenerative disc disease, lumbar   • Degenerative disc disease, cervical   • Cervical disc herniation   • Chronic midline low back pain with left-sided sciatica   • Heart disease   • Chronic kidney disease   • Heart failure (HCC)   • Hypertension   • Osteoporosis   • Seizures (HCC)   • COPD (chronic obstructive pulmonary disease) (HCC)   • Hyperlipidemia   • Sleep apnea   • Hypotension due to drugs     Past Medical History:   Diagnosis Date   • Arthritis    • Chronic kidney disease    • Headache    • Heart disease    • Heart failure (HCC)    • Hypertension    • Low back pain    • Osteoporosis    • Recent urinary tract infection    • Seizures (HCC)      Past Surgical History:   Procedure Laterality Date   •  SECTION     • COLONOSCOPY        General Information     Row Name 23 1325          Physical Therapy Time and Intention    Document Type evaluation  -AY     Mode of Treatment physical therapy  -AY     Row Name 23 5905          General Information    Patient Profile Reviewed yes  -AY     Prior Level of Function independent:;all household mobility;community mobility;gait;transfer;bed mobility;using stairs  amb with SPC in community only  -AY     Existing Precautions/Restrictions fall  -AY     Barriers to Rehab none identified  -AY     Row Name 23 1325          Living Environment    People in Home child(sangita), adult  DTR and YAMILET  -AY     Row Name 23 1325          Home Main Entrance    Number of Stairs, Main Entrance  none  -AY     Row Name 03/18/23 1325          Stairs Within Home, Primary    Stairs, Within Home, Primary 5 + 12 (5 to reach kitchen, 12 to reach bathroom)  -AY     Number of Stairs, Within Home, Primary other (see comments)  -AY     Stair Railings, Within Home, Primary railing on left side (ascending)  -AY     Row Name 03/18/23 1325          Cognition    Orientation Status (Cognition) oriented x 4  -AY     Row Name 03/18/23 1325          Safety Issues, Functional Mobility    Impairments Affecting Function (Mobility) endurance/activity tolerance;balance  -AY           User Key  (r) = Recorded By, (t) = Taken By, (c) = Cosigned By    Initials Name Provider Type    Itzel Macedo, PT Physical Therapist               Mobility     Row Name 03/18/23 1326          Sit-Stand Transfer    Sit-Stand [place] (Transfers) standby assist  -AY     Assistive Device (Sit-Stand Transfers) cane, straight  -AY     Row Name 03/18/23 1326          Gait/Stairs (Locomotion)    [place] Level (Gait) minimum assist (75% patient effort)  -AY     Assistive Device (Gait) cane, straight  -AY     Distance in Feet (Gait) 250  -AY     Deviations/Abnormal Patterns (Gait) ayesha decreased;gait speed decreased;stride length decreased;weight shifting decreased  -AY     Bilateral Gait Deviations heel strike decreased;forward flexed posture  -AY     Comment, (Gait/Stairs) pt demonstrated step through gait pattern with decreased ayesha and flexed posture. pt requiring min A progressing to CGA wiht continued mobility with 2 LOB. cueing for sequencing with SPC, posture, increased stride length, and increased ANTOINETTE. Gait distance limited by fatigue. SPC administered. stair training deferred d/t short IV length. Pt educated about home safety wiht stair navigation and need for DTR to be present/gait belt usage/sequencing.  -AY           User Key  (r) = Recorded By, (t) = Taken By, (c) = Cosigned By    Initials Name Provider Type    Itzel Macedo  PT Physical Therapist               Obj/Interventions     Row Name 03/18/23 1327          Range of Motion Comprehensive    General Range of Motion bilateral lower extremity ROM WFL  -AY     Row Name 03/18/23 1327          Strength Comprehensive (MMT)    General Manual Muscle Testing (MMT) Assessment lower extremity strength deficits identified  -AY     Comment, General Manual Muscle Testing (MMT) Assessment BLE grossly 4+/5  -AY     Row Name 03/18/23 1327          Balance    Balance Assessment sitting static balance;sitting dynamic balance;sit to stand dynamic balance;standing dynamic balance;standing static balance  -AY     Static Sitting Balance independent  -AY     Dynamic Sitting Balance supervision  -AY     Position, Sitting Balance unsupported;sitting in chair  -AY     Sit to Stand Dynamic Balance supervision  -AY     Static Standing Balance supervision  -AY     Dynamic Standing Balance contact guard  -AY     Position/Device Used, Standing Balance supported;cane, straight  -AY     Row Name 03/18/23 1327          Sensory Assessment (Somatosensory)    Sensory Assessment (Somatosensory) LE sensation intact  -AY           User Key  (r) = Recorded By, (t) = Taken By, (c) = Cosigned By    Initials Name Provider Type    AY Itzel Patterson, PT Physical Therapist               Goals/Plan     Row Name 03/18/23 1331          Bed Mobility Goal 1 (PT)    Activity/Assistive Device (Bed Mobility Goal 1, PT) sit to supine/supine to sit  -AY     [place] Level/Cues Needed (Bed Mobility Goal 1, PT) independent  -AY     Time Frame (Bed Mobility Goal 1, PT) long term goal (LTG);10 days  -AY     Progress/Outcomes (Bed Mobility Goal 1, PT) goal ongoing  -AY     Row Name 03/18/23 1331          Transfer Goal 1 (PT)    Activity/Assistive Device (Transfer Goal 1, PT) sit-to-stand/stand-to-sit;cane, straight  -AY     [place] Level/Cues Needed (Transfer Goal 1, PT) modified independence  -AY     Time Frame (Transfer Goal 1, PT)  long term goal (LTG);10 days  -AY     Progress/Outcome (Transfer Goal 1, PT) goal ongoing  -AY     Row Name 03/18/23 1331          Gait Training Goal 1 (PT)    Activity/Assistive Device (Gait Training Goal 1, PT) gait (walking locomotion);assistive device use;cane, straight  -AY     [place] Level (Gait Training Goal 1, PT) modified independence  -AY     Distance (Gait Training Goal 1, PT) 500  -AY     Time Frame (Gait Training Goal 1, PT) long term goal (LTG);10 days  -AY     Progress/Outcome (Gait Training Goal 1, PT) goal ongoing  -AY     Row Name 03/18/23 1331          Stairs Goal 1 (PT)    Activity/Assistive Device (Stairs Goal 1, PT) ascending stairs;descending stairs;using handrail, left;cane, straight  -AY     [place] Level/Cues Needed (Stairs Goal 1, PT) supervision required  -AY     Number of Stairs (Stairs Goal 1, PT) 12  -AY     Time Frame (Stairs Goal 1, PT) long term goal (LTG);10 days  -AY     Progress/Outcome (Stairs Goal 1, PT) goal ongoing  -AY     Row Name 03/18/23 7721          Therapy Assessment/Plan (PT)    Planned Therapy Interventions (PT) balance training;bed mobility training;gait training;home exercise program;postural re-education;transfer training;patient/family education;strengthening;stair training;neuromuscular re-education  -AY           User Key  (r) = Recorded By, (t) = Taken By, (c) = Cosigned By    Initials Name Provider Type    AY Itzel Patterson, PT Physical Therapist               Clinical Impression     Row Name 03/18/23 7005          Pain    Pretreatment Pain Rating 0/10 - no pain  -AY     Posttreatment Pain Rating 0/10 - no pain  -AY     Pain Intervention(s) Ambulation/increased activity;Repositioned  -AY     Additional Documentation Pain Scale: Numbers Pre/Post-Treatment (Group)  -AY     Row Name 03/18/23 5163          Plan of Care Review    Plan of Care Reviewed With patient  -AY     Progress no change  -AY     Outcome Evaluation PT initial eval completed. Pt  limited by mild balance and endurance deficits compared to baseline. Pt ambulated 250ft with SPC and min A progressing to CGA with continued mobility. Rec continued skilled PT to increase indep with mobility. SPC administered. Rec stair training next session. d/c rec for home with assist once appropriate.  -AY     Row Name 03/18/23 1328          Therapy Assessment/Plan (PT)    Rehab Potential (PT) good, to achieve stated therapy goals  -AY     Criteria for Skilled Interventions Met (PT) yes;meets criteria;skilled treatment is necessary  -AY     Therapy Frequency (PT) daily  -AY     Row Name 03/18/23 1328          Vital Signs    Pre Systolic BP Rehab 153  -AY     Pre Treatment Diastolic   -AY     Post Systolic BP Rehab 154  -AY     Post Treatment Diastolic BP 88  -AY     Pretreatment Heart Rate (beats/min) 78  -AY     Posttreatment Heart Rate (beats/min) 67  -AY     Pre SpO2 (%) 100  -AY     O2 Delivery Pre Treatment room air  -AY     O2 Delivery Intra Treatment room air  -AY     Post SpO2 (%) 99  -AY     O2 Delivery Post Treatment room air  -AY     Pre Patient Position Sitting  -AY     Intra Patient Position Standing  -AY     Post Patient Position Sitting  -AY     Row Name 03/18/23 1328          Positioning and Restraints    Pre-Treatment Position sitting in chair/recliner  -AY     Post Treatment Position chair  -AY     In Chair notified nsg;reclined;sitting;call light within reach;encouraged to call for assist;exit alarm on;legs elevated;waffle cushion  -AY           User Key  (r) = Recorded By, (t) = Taken By, (c) = Cosigned By    Initials Name Provider Type    AY Itzel Patterson, PT Physical Therapist               Outcome Measures     Row Name 03/18/23 1331          How much help from another person do you currently need...    Turning from your back to your side while in flat bed without using bedrails? 4  -AY     Moving from lying on back to sitting on the side of a flat bed without bedrails? 3  -AY      Moving to and from a bed to a chair (including a wheelchair)? 3  -AY     Standing up from a chair using your arms (e.g., wheelchair, bedside chair)? 3  -AY     Climbing 3-5 steps with a railing? 3  -AY     To walk in hospital room? 3  -AY     AM-PAC 6 Clicks Score (PT) 19  -AY     Highest level of mobility 6 --> Walked 10 steps or more  -AY     Row Name 03/18/23 1331 03/18/23 1126       Functional Assessment    Outcome Measure Options AM-PAC 6 Clicks Basic Mobility (PT)  -AY AM-PAC 6 Clicks Daily Activity (OT)  -CS          User Key  (r) = Recorded By, (t) = Taken By, (c) = Cosigned By    Initials Name Provider Type    CS Avelino Velásquez OT Occupational Therapist    Itzel Macedo, PT Physical Therapist                             Physical Therapy Education     Title: PT OT SLP Therapies (In Progress)     Topic: Physical Therapy (In Progress)     Point: Mobility training (Done)     Learning Progress Summary           Patient Acceptance, E,TB, VU,NR by AY at 3/18/2023 1332                   Point: Home exercise program (Not Started)     Learner Progress:  Not documented in this visit.          Point: Body mechanics (Done)     Learning Progress Summary           Patient Acceptance, E,TB, VU,NR by AY at 3/18/2023 1332                   Point: Precautions (Done)     Learning Progress Summary           Patient Acceptance, E,TB, VU,NR by AY at 3/18/2023 1332                               User Key     Initials Effective Dates Name Provider Type Discipline    AY 11/10/20 -  Itzel Patterson, PT Physical Therapist PT              PT Recommendation and Plan  Planned Therapy Interventions (PT): balance training, bed mobility training, gait training, home exercise program, postural re-education, transfer training, patient/family education, strengthening, stair training, neuromuscular re-education  Plan of Care Reviewed With: patient  Progress: no change  Outcome Evaluation: PT initial eval completed. Pt limited by mild balance  and endurance deficits compared to baseline. Pt ambulated 250ft with SPC and min A progressing to CGA with continued mobility. Rec continued skilled PT to increase indep with mobility. SPC administered. Rec stair training next session. d/c rec for home with assist once appropriate.     Time Calculation:    PT Charges     Row Name 03/18/23 1332             Time Calculation    Start Time 0930  -AY      PT Received On 03/18/23  -AY      PT Goal Re-Cert Due Date 03/28/23  -AY         Untimed Charges    PT Eval/Re-eval Minutes 46  -AY         Total Minutes    Untimed Charges Total Minutes 46  -AY       Total Minutes 46  -AY            User Key  (r) = Recorded By, (t) = Taken By, (c) = Cosigned By    Initials Name Provider Type    AY Itzel Patterson PT Physical Therapist              Therapy Charges for Today     Code Description Service Date Service Provider Modifiers Qty    23399579387 HC PT EVAL MOD COMPLEXITY 4 3/18/2023 Itzel Patterson, TONY GP 1          PT G-Codes  Outcome Measure Options: AM-PAC 6 Clicks Basic Mobility (PT)  AM-PAC 6 Clicks Score (PT): 19  AM-PAC 6 Clicks Score (OT): 22  PT Discharge Summary  Anticipated Discharge Disposition (PT): home with assist    Itzel Patterson PT  3/18/2023

## 2023-03-19 LAB
ANION GAP SERPL CALCULATED.3IONS-SCNC: 8 MMOL/L (ref 5–15)
BUN SERPL-MCNC: 16 MG/DL (ref 8–23)
BUN/CREAT SERPL: 12 (ref 7–25)
CALCIUM SPEC-SCNC: 8.7 MG/DL (ref 8.6–10.5)
CHLORIDE SERPL-SCNC: 107 MMOL/L (ref 98–107)
CO2 SERPL-SCNC: 27 MMOL/L (ref 22–29)
CREAT SERPL-MCNC: 1.33 MG/DL (ref 0.57–1)
DEPRECATED RDW RBC AUTO: 47.2 FL (ref 37–54)
EGFRCR SERPLBLD CKD-EPI 2021: 44.8 ML/MIN/1.73
ERYTHROCYTE [DISTWIDTH] IN BLOOD BY AUTOMATED COUNT: 13.3 % (ref 12.3–15.4)
GLUCOSE SERPL-MCNC: 79 MG/DL (ref 65–99)
HCT VFR BLD AUTO: 32.3 % (ref 34–46.6)
HGB BLD-MCNC: 10.7 G/DL (ref 12–15.9)
MAGNESIUM SERPL-MCNC: 1.6 MG/DL (ref 1.6–2.4)
MCH RBC QN AUTO: 32 PG (ref 26.6–33)
MCHC RBC AUTO-ENTMCNC: 33.1 G/DL (ref 31.5–35.7)
MCV RBC AUTO: 96.7 FL (ref 79–97)
PLATELET # BLD AUTO: 384 10*3/MM3 (ref 140–450)
PMV BLD AUTO: 9.5 FL (ref 6–12)
POTASSIUM SERPL-SCNC: 3.8 MMOL/L (ref 3.5–5.2)
QT INTERVAL: 484 MS
QT INTERVAL: 524 MS
QTC INTERVAL: 514 MS
QTC INTERVAL: 524 MS
RBC # BLD AUTO: 3.34 10*6/MM3 (ref 3.77–5.28)
SODIUM SERPL-SCNC: 142 MMOL/L (ref 136–145)
WBC NRBC COR # BLD: 9.43 10*3/MM3 (ref 3.4–10.8)

## 2023-03-19 PROCEDURE — 83735 ASSAY OF MAGNESIUM: CPT | Performed by: INTERNAL MEDICINE

## 2023-03-19 PROCEDURE — 25010000002 HEPARIN (PORCINE) PER 1000 UNITS: Performed by: INTERNAL MEDICINE

## 2023-03-19 PROCEDURE — 80048 BASIC METABOLIC PNL TOTAL CA: CPT | Performed by: INTERNAL MEDICINE

## 2023-03-19 PROCEDURE — 25010000002 HYDRALAZINE PER 20 MG: Performed by: NURSE PRACTITIONER

## 2023-03-19 PROCEDURE — 0 MAGNESIUM SULFATE 4 GM/100ML SOLUTION: Performed by: INTERNAL MEDICINE

## 2023-03-19 PROCEDURE — 99232 SBSQ HOSP IP/OBS MODERATE 35: CPT | Performed by: INTERNAL MEDICINE

## 2023-03-19 PROCEDURE — 85027 COMPLETE CBC AUTOMATED: CPT | Performed by: INTERNAL MEDICINE

## 2023-03-19 RX ORDER — MAGNESIUM SULFATE HEPTAHYDRATE 40 MG/ML
4 INJECTION, SOLUTION INTRAVENOUS ONCE
Status: COMPLETED | OUTPATIENT
Start: 2023-03-19 | End: 2023-03-19

## 2023-03-19 RX ORDER — HYDRALAZINE HYDROCHLORIDE 20 MG/ML
10 INJECTION INTRAMUSCULAR; INTRAVENOUS ONCE
Status: COMPLETED | OUTPATIENT
Start: 2023-03-19 | End: 2023-03-19

## 2023-03-19 RX ORDER — AMLODIPINE BESYLATE 10 MG/1
10 TABLET ORAL
Status: DISCONTINUED | OUTPATIENT
Start: 2023-03-19 | End: 2023-03-20 | Stop reason: HOSPADM

## 2023-03-19 RX ORDER — TRAZODONE HYDROCHLORIDE 50 MG/1
50 TABLET ORAL NIGHTLY
Status: DISCONTINUED | OUTPATIENT
Start: 2023-03-19 | End: 2023-03-20 | Stop reason: HOSPADM

## 2023-03-19 RX ORDER — HYDRALAZINE HYDROCHLORIDE 10 MG/1
10 TABLET, FILM COATED ORAL EVERY 8 HOURS SCHEDULED
Status: DISCONTINUED | OUTPATIENT
Start: 2023-03-19 | End: 2023-03-20 | Stop reason: HOSPADM

## 2023-03-19 RX ADMIN — METOPROLOL TARTRATE 6.25 MG: 25 TABLET, FILM COATED ORAL at 09:42

## 2023-03-19 RX ADMIN — Medication 10 ML: at 20:07

## 2023-03-19 RX ADMIN — ASPIRIN 81 MG: 81 TABLET, COATED ORAL at 09:42

## 2023-03-19 RX ADMIN — HYDRALAZINE HYDROCHLORIDE 10 MG: 10 TABLET ORAL at 20:07

## 2023-03-19 RX ADMIN — LEVETIRACETAM 500 MG: 500 TABLET, FILM COATED ORAL at 20:06

## 2023-03-19 RX ADMIN — HYDRALAZINE HYDROCHLORIDE 10 MG: 20 INJECTION INTRAMUSCULAR; INTRAVENOUS at 05:39

## 2023-03-19 RX ADMIN — CLOPIDOGREL BISULFATE 75 MG: 75 TABLET ORAL at 09:42

## 2023-03-19 RX ADMIN — BUSPIRONE HYDROCHLORIDE 5 MG: 10 TABLET ORAL at 20:07

## 2023-03-19 RX ADMIN — HYDRALAZINE HYDROCHLORIDE 10 MG: 10 TABLET ORAL at 15:00

## 2023-03-19 RX ADMIN — LEVETIRACETAM 500 MG: 500 TABLET, FILM COATED ORAL at 09:43

## 2023-03-19 RX ADMIN — MAGNESIUM SULFATE HEPTAHYDRATE 4 G: 40 INJECTION, SOLUTION INTRAVENOUS at 09:47

## 2023-03-19 RX ADMIN — HYDROCODONE BITARTRATE AND ACETAMINOPHEN 1 TABLET: 5; 325 TABLET ORAL at 20:07

## 2023-03-19 RX ADMIN — BUSPIRONE HYDROCHLORIDE 5 MG: 10 TABLET ORAL at 09:42

## 2023-03-19 RX ADMIN — METOPROLOL TARTRATE 6.25 MG: 25 TABLET, FILM COATED ORAL at 20:07

## 2023-03-19 RX ADMIN — GABAPENTIN 200 MG: 100 CAPSULE ORAL at 20:06

## 2023-03-19 RX ADMIN — GABAPENTIN 200 MG: 100 CAPSULE ORAL at 15:03

## 2023-03-19 RX ADMIN — SODIUM CHLORIDE 100 ML/HR: 9 INJECTION, SOLUTION INTRAVENOUS at 05:39

## 2023-03-19 RX ADMIN — PANTOPRAZOLE SODIUM 40 MG: 40 TABLET, DELAYED RELEASE ORAL at 05:39

## 2023-03-19 RX ADMIN — HEPARIN SODIUM 5000 UNITS: 5000 INJECTION INTRAVENOUS; SUBCUTANEOUS at 15:00

## 2023-03-19 RX ADMIN — GABAPENTIN 200 MG: 100 CAPSULE ORAL at 09:42

## 2023-03-19 RX ADMIN — Medication 1 PATCH: at 09:50

## 2023-03-19 RX ADMIN — TRAZODONE HYDROCHLORIDE 50 MG: 50 TABLET ORAL at 20:06

## 2023-03-19 RX ADMIN — ATORVASTATIN CALCIUM 80 MG: 40 TABLET, FILM COATED ORAL at 20:07

## 2023-03-19 RX ADMIN — HEPARIN SODIUM 5000 UNITS: 5000 INJECTION INTRAVENOUS; SUBCUTANEOUS at 20:07

## 2023-03-19 RX ADMIN — POTASSIUM CHLORIDE 10 MEQ: 10 CAPSULE, COATED, EXTENDED RELEASE ORAL at 09:43

## 2023-03-19 RX ADMIN — ESCITALOPRAM OXALATE 20 MG: 20 TABLET ORAL at 09:42

## 2023-03-19 RX ADMIN — HEPARIN SODIUM 5000 UNITS: 5000 INJECTION INTRAVENOUS; SUBCUTANEOUS at 05:39

## 2023-03-19 RX ADMIN — AMLODIPINE BESYLATE 10 MG: 10 TABLET ORAL at 09:43

## 2023-03-19 NOTE — PROGRESS NOTES
[facility] Medicine Services  PROGRESS NOTE    Patient Name: Gabriela Waller  : 1960  MRN: [id number]    Date of Admission: 3/16/2023  Primary Care Physician: Margarita Lynch MD    Subjective   Subjective     CC:  diarrhea    HPI:  Only 1 bowel movement yesterday, none today  Blood pressure elevated - given IV hydralazine x 1 overnight last night. Feels well - doesn't know how to interpret her BP numbers at home so is unsure if they are high or low. Seem to fluctuate greatly at baseline before this  Hasn't been out of bed recently    ROS:  Gen- No fevers, chills  CV- No chest pain, palpitations  Resp- No cough, dyspnea    Objective   Objective     Vital Signs:   Temp:  [97.7 °F (36.5 °C)-98.1 °F (36.7 °C)] 97.7 °F (36.5 °C)  Heart Rate:  [55-82] 79  Resp:  [16-20] 16  BP: (146-192)/() 152/95     Physical Exam:  Constitutional: No acute distress, awake, alert female sitting up in bed  HENT: NCAT, mucous membranes moist  Respiratory: Clear to auscultation bilaterally, respiratory effort normal   Cardiovascular: RRR, no murmurs, rubs, or gallops  Gastrointestinal: Soft, nondistended. Some RLQ tenderness to palpation on exam   Musculoskeletal: Muscle tone within normal limits, no joint effusions appreciated  Psychiatric: Appropriate affect, cooperative  Neurologic: Alert and oriented, facial movements symmetric and spontaneous movement of all 4 extremities grossly equal bilaterally, speech clear  Skin: No rashes    Results Reviewed:  LAB RESULTS:      Lab 23  0528 23  0351 23  0305 23  2359 23  2115   WBC 9.43 8.57 11.58*  --  16.24*   HEMOGLOBIN 10.7* 10.1* 10.8*  --  13.1   HEMATOCRIT 32.3* 31.0* 32.8*  --  38.9   PLATELETS 384 356 403  --  492*   NEUTROS ABS  --   --  7.73*  --  11.71*   IMMATURE GRANS (ABS)  --   --  0.05  --  0.10*   LYMPHS ABS  --   --  2.61  --  2.58   MONOS ABS  --   --  1.14*  --  1.78*   EOS ABS  --   --  0.01  --  0.02    MCV 96.7 97.2* 97.0  --  94.6   PROCALCITONIN  --   --   --   --  0.33*   LACTATE  --   --   --  1.6 2.5*   PROTIME  --   --   --   --  13.4   APTT  --   --   --   --  26.8         Lab 03/19/23  0528 03/18/23  0351 03/17/23  0756 03/17/23  0305 03/16/23 2115   SODIUM 142 140  --  136 132*   POTASSIUM 3.8 3.5 3.3* 2.8* 3.4*   CHLORIDE 107 110*  --  101 89*   CO2 27.0 20.0*  --  24.0 27.0   ANION GAP 8.0 10.0  --  11.0 16.0*   BUN 16 22 -- 23 23   CREATININE 1.33* 1.74*  --  2.35* 2.44*   EGFR 44.8* 32.4*  --  22.6* 21.6*   GLUCOSE 79 72  --  94 114*   CALCIUM 8.7 7.9*  --  7.3* 8.5*   IONIZED CALCIUM  --   --   --   --  1.17   MAGNESIUM 1.6 1.8  --  2.2 1.6   PHOSPHORUS  --   --   --  4.2  --    TSH  --   --  1.220  --   --          Lab 03/16/23 2115   TOTAL PROTEIN 6.0   ALBUMIN 3.6   GLOBULIN 2.4   ALT (SGPT) 9   AST (SGOT) 18   BILIRUBIN 0.4   ALK PHOS 77         Lab 03/16/23 2359 03/16/23 2148 03/16/23 2115   HSTROP T 34* 39* 39*   PROTIME  --   --  13.4   INR  --   --  1.03                 Lab 03/16/23  2219   FIO2 21   CARBOXYHEMOGLOBIN (VENOUS) 2.3     Brief Urine Lab Results  (Last result in the past 365 days)      Color   Clarity   Blood   Leuk Est   Nitrite   Protein   CREAT   Urine HCG        03/16/23 2141 Dark Yellow   Turbid   Moderate (2+)   Negative   Negative   >=300 mg/dL (3+)                 Microbiology Results Abnormal     Procedure Component Value - Date/Time    Blood Culture - Blood, Arm, Left [[id number]]  (Normal) Collected: 03/16/23 2150    Lab Status: Preliminary result Specimen: Blood from Arm, Left Updated: 03/18/23 2246     Blood Culture No growth at 2 days    Blood Culture - Blood, Arm, Left [[id number]]  (Normal) Collected: 03/16/23 2140    Lab Status: Preliminary result Specimen: Blood from Arm, Left Updated: 03/18/23 2246     Blood Culture No growth at 2 days    COVID PRE-OP / PRE-PROCEDURE SCREENING ORDER (NO ISOLATION) - Swab, Nasopharynx [[id number]]  (Normal) Collected:  03/16/23 2142    Lab Status: Final result Specimen: Swab from Nasopharynx Updated: 03/16/23 2243    Narrative:      The following orders were created for panel order COVID PRE-OP / PRE-PROCEDURE SCREENING ORDER (NO ISOLATION) - Swab, Nasopharynx.  Procedure                               Abnormality         Status                     ---------                               -----------         ------                     Respiratory Panel PCR w/...[[id number]]  Normal              Final result                 Please view results for these tests on the individual orders.    Respiratory Panel PCR w/COVID-19(SARS-CoV-2) DERRELL/CINDY/DIAMANTE/PAD/COR/MAD/WALE In-House, NP Swab in UTM/VTM, 3-4 HR TAT - Swab, Nasopharynx [[id number]]  (Normal) Collected: 03/16/23 2142    Lab Status: Final result Specimen: Swab from Nasopharynx Updated: 03/16/23 2243     ADENOVIRUS, PCR Not Detected     Coronavirus 229E Not Detected     Coronavirus HKU1 Not Detected     Coronavirus NL63 Not Detected     Coronavirus OC43 Not Detected     COVID19 Not Detected     Human Metapneumovirus Not Detected     Human Rhinovirus/Enterovirus Not Detected     Influenza A PCR Not Detected     Influenza B PCR Not Detected     Parainfluenza Virus 1 Not Detected     Parainfluenza Virus 2 Not Detected     Parainfluenza Virus 3 Not Detected     Parainfluenza Virus 4 Not Detected     RSV, PCR Not Detected     Bordetella pertussis pcr Not Detected     Bordetella parapertussis PCR Not Detected     Chlamydophila pneumoniae PCR Not Detected     Mycoplasma pneumo by PCR Not Detected    Narrative:      In the setting of a positive respiratory panel with a viral infection PLUS a negative procalcitonin without other underlying concern for bacterial infection, consider observing off antibiotics or discontinuation of antibiotics and continue supportive care. If the respiratory panel is positive for atypical bacterial infection (Bordetella pertussis, Chlamydophila pneumoniae, or  Mycoplasma pneumoniae), consider antibiotic de-escalation to target atypical bacterial infection.          Adult Transthoracic Echo Complete W/ Cont if Necessary Per Protocol    Result Date: 3/18/2023  •  Left ventricular systolic function is normal. Calculated left ventricular EF = 60.3% •  Left ventricular diastolic function was normal. •  Left atrial volume is mildly increased. •  Estimated right ventricular systolic pressure from tricuspid regurgitation is normal (<35 mmHg).       Results for orders placed during the hospital encounter of 03/16/23    Adult Transthoracic Echo Complete W/ Cont if Necessary Per Protocol    Interpretation Summary  •  Left ventricular systolic function is normal. Calculated left ventricular EF = 60.3%  •  Left ventricular diastolic function was normal.  •  Left atrial volume is mildly increased.  •  Estimated right ventricular systolic pressure from tricuspid regurgitation is normal (<35 mmHg).      Current medications:  Scheduled Meds:amLODIPine, 10 mg, Oral, Q24H  aspirin, 81 mg, Oral, Daily  atorvastatin, 80 mg, Oral, Nightly  busPIRone, 5 mg, Oral, BID  clopidogrel, 75 mg, Oral, Daily  escitalopram, 20 mg, Oral, Daily  gabapentin, 200 mg, Oral, TID  heparin (porcine), 5,000 Units, Subcutaneous, Q8H  hydrALAZINE, 10 mg, Oral, Q8H  levETIRAcetam, 500 mg, Oral, BID  magnesium sulfate, 4 g, Intravenous, Once  metoprolol tartrate, 6.25 mg, Oral, Q12H  nicotine, 1 patch, Transdermal, Q24H  pantoprazole, 40 mg, Oral, Q AM  potassium chloride, 10 mEq, Oral, Daily  traZODone, 50 mg, Oral, Nightly      Continuous Infusions:   PRN Meds:.•  Calcium Replacement - Initiate Nurse / BPA Driven Protocol  •  HYDROcodone-acetaminophen  •  Magnesium Standard Dose Replacement - Initiate Nurse / BPA Driven Protocol  •  Phosphorus Replacement - Initiate Nurse / BPA Driven Protocol  •  Potassium Replacement - Initiate Nurse / BPA Driven Protocol  •  sodium chloride    Assessment & Plan   Assessment &  Plan     Active Hospital Problems    Diagnosis  POA   • **Hypotension due to drugs [I95.2]  Yes   • BOBY (acute kidney injury) (HCC) [N17.9]  Unknown   • Essential hypertension [I10]  Unknown   • Seizures (HCC) [R56.9]  Yes      Resolved Hospital Problems    Diagnosis Date Resolved POA   • Chronic kidney disease [N18.9] 03/18/2023 Yes        Brief Hospital Course to date:  Gabriela Waller is a 62 y.o. female w h/o CKD, seizure disorder admitted to ICU on 3/16 with hypotension in setting of diarrhea. Initially required pressor support, able to transfer to floor 3/19    Hypovolemic shock requiring pressors 2/2 diarrheal illness + anti-HTN  Lactic acidosis  Severe BOBY -resolving  -responded to IVF + holding home antihypertensives  -diarrhea resolved 3/18 before testing  -d/c IVF today, encouraged PO intake    HTN  -now elevated with diarrhea resolving  -restart amlodipine + lower dose hydralazine + lower dose metoprolol  -can increase hydralazine to home dosing if hypertensive later  -holding ARB now 2/2 resolving BOBY  -counseled on holding ARB + hydralazine in future if n/v/d illness and checking BP    Hypokalemia - resolved  Seizure disorder - keppra  H/o CAD (data deficit) - home statin, ASA, metoprol lower dose as above  Insomnia - trazodone  Mood disorder - lexapro + buspar    Will restart home anti-HTN as above, ambulate, hopefully home tmrw    Expected Discharge Location and Transportation: home  Expected Discharge 3/20  Expected Discharge Date and Time     Expected Discharge Date Expected Discharge Time    Mar 20, 2023            DVT prophylaxis:  Medical DVT prophylaxis orders are present.     AM-PAC 6 Clicks Score (PT): 19 (03/18/23 1521)    CODE STATUS:   Code Status and Medical Interventions:   Ordered at: 03/17/23 0027     Code Status (Patient has no pulse and is not breathing):    CPR (Attempt to Resuscitate)     Medical Interventions (Patient has pulse or is breathing):    Full Support     Release to  patient:    Routine Release       Samantha Bejarano MD  03/19/23

## 2023-03-19 NOTE — PLAN OF CARE
Goal Outcome Evaluation:  Plan of Care Reviewed With: patient        Progress: improving  Outcome Evaluation: BP elevated today, improved w/ antihypertensive Rx. IV fluids discontinued, pt taking adequate p.o.  NSR. Magnesium repleted (a.m. lab Mg ordered). Plan discharge home w/ daughter when medically ready.

## 2023-03-20 ENCOUNTER — READMISSION MANAGEMENT (OUTPATIENT)
Dept: CALL CENTER | Facility: HOSPITAL | Age: 63
End: 2023-03-20
Payer: MEDICARE

## 2023-03-20 VITALS
HEIGHT: 60 IN | DIASTOLIC BLOOD PRESSURE: 76 MMHG | BODY MASS INDEX: 25.42 KG/M2 | HEART RATE: 76 BPM | SYSTOLIC BLOOD PRESSURE: 144 MMHG | OXYGEN SATURATION: 94 % | TEMPERATURE: 97.6 F | WEIGHT: 129.5 LBS | RESPIRATION RATE: 16 BRPM

## 2023-03-20 PROBLEM — I95.2 HYPOTENSION DUE TO DRUGS: Status: RESOLVED | Noted: 2023-03-17 | Resolved: 2023-03-20

## 2023-03-20 PROBLEM — R19.7 ACUTE DIARRHEA: Status: RESOLVED | Noted: 2023-03-20 | Resolved: 2023-03-20

## 2023-03-20 PROBLEM — N17.9 AKI (ACUTE KIDNEY INJURY): Status: RESOLVED | Noted: 2023-03-18 | Resolved: 2023-03-20

## 2023-03-20 PROBLEM — R19.7 ACUTE DIARRHEA: Status: ACTIVE | Noted: 2023-03-20

## 2023-03-20 LAB
ALBUMIN SERPL-MCNC: 3.5 G/DL (ref 3.5–5.2)
ANION GAP SERPL CALCULATED.3IONS-SCNC: 8 MMOL/L (ref 5–15)
BUN SERPL-MCNC: 21 MG/DL (ref 8–23)
BUN/CREAT SERPL: 16.7 (ref 7–25)
CALCIUM SPEC-SCNC: 8.7 MG/DL (ref 8.6–10.5)
CHLORIDE SERPL-SCNC: 105 MMOL/L (ref 98–107)
CO2 SERPL-SCNC: 26 MMOL/L (ref 22–29)
CREAT SERPL-MCNC: 1.26 MG/DL (ref 0.57–1)
EGFRCR SERPLBLD CKD-EPI 2021: 47.8 ML/MIN/1.73
GLUCOSE SERPL-MCNC: 81 MG/DL (ref 65–99)
MAGNESIUM SERPL-MCNC: 2.4 MG/DL (ref 1.6–2.4)
PHOSPHATE SERPL-MCNC: 4 MG/DL (ref 2.5–4.5)
POTASSIUM SERPL-SCNC: 3.7 MMOL/L (ref 3.5–5.2)
SODIUM SERPL-SCNC: 139 MMOL/L (ref 136–145)

## 2023-03-20 PROCEDURE — 83735 ASSAY OF MAGNESIUM: CPT | Performed by: INTERNAL MEDICINE

## 2023-03-20 PROCEDURE — 99239 HOSP IP/OBS DSCHRG MGMT >30: CPT | Performed by: INTERNAL MEDICINE

## 2023-03-20 PROCEDURE — 97116 GAIT TRAINING THERAPY: CPT

## 2023-03-20 PROCEDURE — 97110 THERAPEUTIC EXERCISES: CPT

## 2023-03-20 PROCEDURE — 25010000002 HEPARIN (PORCINE) PER 1000 UNITS: Performed by: INTERNAL MEDICINE

## 2023-03-20 PROCEDURE — 80069 RENAL FUNCTION PANEL: CPT | Performed by: INTERNAL MEDICINE

## 2023-03-20 RX ORDER — METOPROLOL SUCCINATE 25 MG/1
12.5 TABLET, EXTENDED RELEASE ORAL DAILY
Qty: 15 TABLET | Refills: 0 | Status: SHIPPED | OUTPATIENT
Start: 2023-03-20 | End: 2023-04-19

## 2023-03-20 RX ORDER — LEVETIRACETAM 500 MG/1
500 TABLET ORAL 2 TIMES DAILY
Start: 2023-03-20

## 2023-03-20 RX ORDER — HYDRALAZINE HYDROCHLORIDE 10 MG/1
10 TABLET, FILM COATED ORAL 3 TIMES DAILY
Qty: 90 TABLET | Refills: 0 | Status: SHIPPED | OUTPATIENT
Start: 2023-03-20 | End: 2023-04-19

## 2023-03-20 RX ADMIN — ESCITALOPRAM OXALATE 20 MG: 20 TABLET ORAL at 09:21

## 2023-03-20 RX ADMIN — LEVETIRACETAM 500 MG: 500 TABLET, FILM COATED ORAL at 09:21

## 2023-03-20 RX ADMIN — BUSPIRONE HYDROCHLORIDE 5 MG: 10 TABLET ORAL at 09:21

## 2023-03-20 RX ADMIN — METOPROLOL TARTRATE 6.25 MG: 25 TABLET, FILM COATED ORAL at 09:21

## 2023-03-20 RX ADMIN — Medication 1 PATCH: at 09:24

## 2023-03-20 RX ADMIN — CLOPIDOGREL BISULFATE 75 MG: 75 TABLET ORAL at 09:21

## 2023-03-20 RX ADMIN — HYDRALAZINE HYDROCHLORIDE 10 MG: 10 TABLET ORAL at 06:00

## 2023-03-20 RX ADMIN — GABAPENTIN 200 MG: 100 CAPSULE ORAL at 09:21

## 2023-03-20 RX ADMIN — POTASSIUM CHLORIDE 10 MEQ: 10 CAPSULE, COATED, EXTENDED RELEASE ORAL at 09:22

## 2023-03-20 RX ADMIN — AMLODIPINE BESYLATE 10 MG: 10 TABLET ORAL at 09:21

## 2023-03-20 RX ADMIN — HEPARIN SODIUM 5000 UNITS: 5000 INJECTION INTRAVENOUS; SUBCUTANEOUS at 06:00

## 2023-03-20 RX ADMIN — ASPIRIN 81 MG: 81 TABLET, COATED ORAL at 09:22

## 2023-03-20 RX ADMIN — PANTOPRAZOLE SODIUM 40 MG: 40 TABLET, DELAYED RELEASE ORAL at 06:00

## 2023-03-20 RX ADMIN — HYDROCODONE BITARTRATE AND ACETAMINOPHEN 1 TABLET: 5; 325 TABLET ORAL at 06:00

## 2023-03-20 NOTE — CASE MANAGEMENT/SOCIAL WORK
Case Management Discharge Note      Final Note: Spoke with patient at bedside, she will be discharged home today and has arranged transportation with her family.  Patient denies needs.         Selected Continued Care - Admitted Since 3/16/2023     Destination    No services have been selected for the patient.              Durable Medical Equipment    No services have been selected for the patient.              Dialysis/Infusion    No services have been selected for the patient.              Home Medical Care    No services have been selected for the patient.              Therapy    No services have been selected for the patient.              Community Resources    No services have been selected for the patient.              Community & DME    No services have been selected for the patient.                       Final Discharge Disposition Code: 01 - home or self-care

## 2023-03-20 NOTE — THERAPY TREATMENT NOTE
Patient Name: Gabriela Waller  : 1960    MRN: [id number]                              Today's Date: 3/20/2023       Admit Date: 3/16/2023    Visit Dx:     ICD-10-CM ICD-9-CM   1. Sepsis without acute organ dysfunction, due to unspecified organism ([facility])  A41.9 038.9     995.91   2. Dehydration  E86.0 276.51   3. Diarrhea, unspecified type  R19.7 787.91   4. Hypotension, unspecified hypotension type  I95.9 458.9     Patient Active Problem List   Diagnosis   • Degenerative disc disease, lumbar   • Degenerative disc disease, cervical   • Cervical disc herniation   • Chronic midline low back pain with left-sided sciatica   • Heart disease   • Heart failure ([facility])   • Essential hypertension   • Osteoporosis   • COPD (chronic obstructive pulmonary disease) ([facility])   • Hyperlipidemia   • Sleep apnea     Past Medical History:   Diagnosis Date   • Arthritis    • Chronic kidney disease    • Headache    • Heart disease    • Heart failure ([facility])    • Hypertension    • Low back pain    • Osteoporosis    • Recent urinary tract infection    • Seizures ([facility])      Past Surgical History:   Procedure Laterality Date   •  SECTION     • COLONOSCOPY        General Information     Row Name 23 0954          Physical Therapy Time and Intention    Document Type therapy note (daily note)  -AS     Mode of Treatment physical therapy  -AS     Row Name 23 0954          General Information    Patient Profile Reviewed yes  -AS     Existing Precautions/Restrictions fall  -AS     Barriers to Rehab none identified  -AS     Row Name 23 0954          Cognition    Orientation Status (Cognition) oriented x 4  -AS     Row Name 23 0954          Safety Issues, Functional Mobility    Safety Issues Affecting Function (Mobility) safety precaution awareness  -AS     Impairments Affecting Function (Mobility) endurance/activity tolerance;balance  -AS     Comment, Safety Issues/Impairments (Mobility) alert and following  commands  -AS           User Key  (r) = Recorded By, (t) = Taken By, (c) = Cosigned By    Initials Name Provider Type    AS Itzel Pickard PTA Physical Therapist Assistant               Mobility     Row Name 03/20/23 0955          Bed Mobility    Comment, (Bed Mobility) sitting EOB pre/post tx  -AS     Row Name 03/20/23 0955          Transfers    Comment, (Transfers) good safety awareness with transfers, no c/o dizziness this date  -AS     Row Name 03/20/23 0955          Bed-Chair Transfer    Bed-Chair [place] (Transfers) not tested  -AS     Row Name 03/20/23 0955          Sit-Stand Transfer    Sit-Stand [place] (Transfers) standby assist  -AS     Comment, (Sit-Stand Transfer) no AD used this treatment  -AS     Row Name 03/20/23 0955          Gait/Stairs (Locomotion)    [place] Level (Gait) verbal cues;contact guard;1 person assist  -AS     Assistive Device (Gait) other (see comments)  no AD used  -AS     Distance in Feet (Gait) 100  -AS     Deviations/Abnormal Patterns (Gait) ayesha decreased;gait speed decreased;stride length decreased;weight shifting decreased  -AS     Bilateral Gait Deviations heel strike decreased;forward flexed posture  -AS     Handrail Location (Stairs) left side (ascending);right side (descending)  -AS     Number of Steps (Stairs) 10  -AS     Ascending Technique (Stairs) step-to-step  -AS     Descending Technique (Stairs) step-to-step  -AS     Comment, (Gait/Stairs) patient ambulated 100 feet with CGA x1, no AD used, completed stair trainging 10 steps with CGA x1 and step to step pattern, No LOB or unsteadiness noticed.  -AS           User Key  (r) = Recorded By, (t) = Taken By, (c) = Cosigned By    Initials Name Provider Type    AS Itzel Pickard PTA Physical Therapist Assistant               Obj/Interventions     Row Name 03/20/23 0957          Motor Skills    Therapeutic Exercise knee;ankle  -AS     Row Name 03/20/23 0957          Knee (Therapeutic Exercise)     Knee (Therapeutic Exercise) strengthening exercise  -AS     Knee Strengthening (Therapeutic Exercise) bilateral;marching while seated;LAQ (long arc quad);sitting;10 repetitions  -AS     Row Name 03/20/23 0957          Ankle (Therapeutic Exercise)    Ankle (Therapeutic Exercise) AROM (active range of motion)  -AS     Ankle AROM (Therapeutic Exercise) bilateral;plantarflexion;dorsiflexion;sitting;10 repetitions  -AS           User Key  (r) = Recorded By, (t) = Taken By, (c) = Cosigned By    Initials Name Provider Type    AS Itzel Pickard PTA Physical Therapist Assistant               Goals/Plan    No documentation.                Clinical Impression     Row Name 03/20/23 0957          Pain    Pretreatment Pain Rating 0/10 - no pain  -AS     Posttreatment Pain Rating 0/10 - no pain  -AS     Row Name 03/20/23 0957          Plan of Care Review    Plan of Care Reviewed With patient  -AS     Progress improving  -AS     Outcome Evaluation patient ambulated 100 feet with CGA x1, no AD used, completed stair trainging 10 steps with CGA x1 and step to step pattern, No LOB or unsteadiness noticed. Recommend home with family assist.  -AS     Row Name 03/20/23 0957          Positioning and Restraints    Pre-Treatment Position in bed  -AS     Post Treatment Position bed  -AS     In Bed sitting EOB;call light within reach;encouraged to call for assist  -AS           User Key  (r) = Recorded By, (t) = Taken By, (c) = Cosigned By    Initials Name Provider Type    AS Itzel Pickard PTA Physical Therapist Assistant               Outcome Measures     Row Name 03/20/23 0958          How much help from another person do you currently need...    Turning from your back to your side while in flat bed without using bedrails? 4  -AS     Moving from lying on back to sitting on the side of a flat bed without bedrails? 4  -AS     Moving to and from a bed to a chair (including a wheelchair)? 3  -AS     Standing up from a chair using  your arms (e.g., wheelchair, bedside chair)? 4  -AS     Climbing 3-5 steps with a railing? 3  -AS     To walk in hospital room? 3  -AS     AM-PAC 6 Clicks Score (PT) 21  -AS     Highest level of mobility 6 --> Walked 10 steps or more  -AS     Row Name 03/20/23 0958          Functional Assessment    Outcome Measure Options AM-PAC 6 Clicks Basic Mobility (PT)  -AS           User Key  (r) = Recorded By, (t) = Taken By, (c) = Cosigned By    Initials Name Provider Type    AS Itzel Pickard PTA Physical Therapist Assistant                             Physical Therapy Education     Title: PT OT SLP Therapies (In Progress)     Topic: Physical Therapy (In Progress)     Point: Mobility training (In Progress)     Learning Progress Summary           Patient Acceptance, E, NR by AS at 3/20/2023 0958    Acceptance, E,TB, VU,NR by AY at 3/18/2023 1332                   Point: Home exercise program (In Progress)     Learning Progress Summary           Patient Acceptance, E, NR by AS at 3/20/2023 0958                   Point: Body mechanics (In Progress)     Learning Progress Summary           Patient Acceptance, E, NR by AS at 3/20/2023 0958    Acceptance, E,TB, VU,NR by AY at 3/18/2023 1332                   Point: Precautions (In Progress)     Learning Progress Summary           Patient Acceptance, E, NR by AS at 3/20/2023 0958    Acceptance, E,TB, VU,NR by AY at 3/18/2023 1332                               User Key     Initials Effective Dates Name Provider Type Discipline    AS 02/03/23 -  Itzel Pickard PTA Physical Therapist Assistant PT    AY 11/10/20 -  Itzel Patterson PT Physical Therapist PT              PT Recommendation and Plan     Plan of Care Reviewed With: patient  Progress: improving  Outcome Evaluation: patient ambulated 100 feet with CGA x1, no AD used, completed stair trainging 10 steps with CGA x1 and step to step pattern, No LOB or unsteadiness noticed. Recommend home with family assist.     Time  Calculation:    PT Charges     Row Name 03/20/23 0959             Time Calculation    Start Time 0910  -AS      PT Received On 03/20/23  -AS      PT Goal Re-Cert Due Date 03/28/23  -AS         Timed Charges    71901 - PT Therapeutic Exercise Minutes 10  -AS      35852 - Gait Training Minutes  14  -AS         Total Minutes    Timed Charges Total Minutes 24  -AS       Total Minutes 24  -AS            User Key  (r) = Recorded By, (t) = Taken By, (c) = Cosigned By    Initials Name Provider Type    AS Itzel Pickard PTA Physical Therapist Assistant              Therapy Charges for Today     Code Description Service Date Service Provider Modifiers Qty    38976439906 HC PT THER PROC EA 15 MIN 3/20/2023 Itzel Pickard, AYUSH GP 1    57883015109 HC GAIT TRAINING EA 15 MIN 3/20/2023 Itzel Pickard PTA GP 1          PT G-Codes  Outcome Measure Options: AM-PAC 6 Clicks Basic Mobility (PT)  AM-PAC 6 Clicks Score (PT): 21  AM-PAC 6 Clicks Score (OT): 22       Itzel Pickard PTA  3/20/2023

## 2023-03-20 NOTE — PROGRESS NOTES
Enter Query Response Below      Query Response: Unable to determine             If applicable, please update the problem list.     Patient: Gabriela Waller        : 1960  Account: [id number]           Admit Date:         How to Respond to this query:       a. Click New Note     b. Answer query within the yellow box.                c. Update the Problem List, if applicable.      If you have any questions about this query contact me at: [email or web address]    Dr. Cuello:    62 year old female admitted for hypotension.  Clinical indicators-History significant for hypertension and CHF.  H&P physical exam: Heart: Normal rate.  Regular rhythm.  S1 normal and S2 normal.  No murmur, gallop or friction rub. No dependent edema.  3/16/23 Echo: EF=60.3%.  Treatments:  Apresoline, cozaar, lopressor, iv levophed.  Hold blood pressure medications.   Discharge summary:  Stop cozaar, aldactone.    After study, please further specify:    Chronic diastolic heart failure  Other______________  Unable to determine    By submitting this query, we are merely seeking further clarification of documentation to accurately reflect all conditions that you are monitoring, evaluating, treating or that extend the hospitalization or utilize additional resources of care. Please utilize your independent clinical judgment when addressing the question(s) above.     This query and your response, once completed, will be entered into the legal medical record.    Sincerely,  Nandini Mason RN  Clinical Documentation Integrity Program

## 2023-03-20 NOTE — PLAN OF CARE
Goal Outcome Evaluation:  Plan of Care Reviewed With: patient        Progress: improving  Outcome Evaluation: patient ambulated 100 feet with CGA x1, no AD used, completed stair trainging 10 steps with CGA x1 and step to step pattern, No LOB or unsteadiness noticed. Recommend home with family assist.

## 2023-03-20 NOTE — DISCHARGE SUMMARY
[facility]  DISCHARGE SUMMARY    Patient Name: Gabriela Waller  : 1960  MRN: [id number]    Date of Admission: 3/16/2023  9:12 PM  Date of Discharge:  3/20/2023  Primary Care Physician: Margarita Lynch MD    Consults     No orders found from 2/15/2023 to 3/17/2023.          Hospital Course     Presenting Problem:   Hypotension due to drugs [I95.2]    Active Hospital Problems    Diagnosis  POA   • Essential hypertension [I10]  Yes      Resolved Hospital Problems    Diagnosis Date Resolved POA   • **Hypotension due to drugs [I95.2] 2023 Yes   • Acute diarrhea [R19.7] 2023 Yes   • BOBY (acute kidney injury) (HCC) [N17.9] 2023 Yes   • Chronic kidney disease [N18.9] 2023 Yes          Hospital Course:  Gabriela Waller is a 62 y.o. female h/o CKD, seizure disorder admitted to ICU on 3/16 with hypovolemic shock in setting of diarrhea + many anti-hypertensive medications.   Patient initially required pressor support with significant IV fluid to resolve hypotension and associated BOBY, but diarrhea resolved shortly after discharge and before testing. Her BP meds were restarted at lower doses (hold ARB given BOBY recovery) and she was appropriate for discharge home with assist on 3/20.   Counseled patient on holding anti-hypertensives in future if repeat episodes of severe n/v/d/dehydration and restarted lower dosing. F/u with PCP 1 week with BP log.   Of note, patient on chronic pain medication and asks for refill while here. I did not refill, but contacted patient's pain clinic on her behalf to confirm her admission and facilitate follow-up.    Discharge Follow Up Recommendations for outpatient labs/diagnostics:  F/u PCP 1 week with BP log, repeat BMP for recovery of BOBY per PCP  F/u with pain clinic (patient to schedule)    Day of Discharge     HPI:   Feels well today - ready for home    Review of Systems  Gen- No fevers, chills  CV- No chest pain,  palpitations  Resp- No cough, dyspnea  GI- No N/V/D, abd pain    Vital Signs:   Temp:  [97.6 °F (36.4 °C)-98.3 °F (36.8 °C)] 97.6 °F (36.4 °C)  Heart Rate:  [64-78] 76  Resp:  [16-18] 16  BP: (126-161)/(67-83) 144/76      Physical Exam:  Constitutional: No acute distress, awake, alert older female sitting up in bed, comfortable in appearance  HENT: NCAT, mucous membranes moist  Respiratory: Clear to auscultation bilaterally, respiratory effort normal   Cardiovascular: RRR, no murmurs, rubs, or gallops  Gastrointestinal: Soft, nontender, nondistended  Musculoskeletal: Muscle tone within normal limits, no joint effusions appreciated  Psychiatric: Appropriate affect, cooperative  Neurologic: Alert and oriented, facial movements symmetric and spontaneous movement of all 4 extremities grossly equal bilaterally, speech clear  Skin: No rashes    Pertinent  and/or Most Recent Results     LAB RESULTS:      Lab 03/19/23 0528 03/18/23 0351 03/17/23 0305 03/16/23  2359 03/16/23  2115   WBC 9.43 8.57 11.58*  --  16.24*   HEMOGLOBIN 10.7* 10.1* 10.8*  --  13.1   HEMATOCRIT 32.3* 31.0* 32.8*  --  38.9   PLATELETS 384 356 403  --  492*   NEUTROS ABS  --   --  7.73*  --  11.71*   IMMATURE GRANS (ABS)  --   --  0.05  --  0.10*   LYMPHS ABS  --   --  2.61  --  2.58   MONOS ABS  --   --  1.14*  --  1.78*   EOS ABS  --   --  0.01  --  0.02   MCV 96.7 97.2* 97.0  --  94.6   PROCALCITONIN  --   --   --   --  0.33*   LACTATE  --   --   --  1.6 2.5*   PROTIME  --   --   --   --  13.4   APTT  --   --   --   --  26.8         Lab 03/20/23  0534 03/19/23  0528 03/18/23 0351 03/17/23  0756 03/17/23  0305 03/16/23 2115   SODIUM 139 142 140  --  136 132*   POTASSIUM 3.7 3.8 3.5 3.3* 2.8* 3.4*   CHLORIDE 105 107 110*  --  101 89*   CO2 26.0 27.0 20.0*  --  24.0 27.0   ANION GAP 8.0 8.0 10.0  --  11.0 16.0*   BUN 21 16 22  --  23 23   CREATININE 1.26* 1.33* 1.74*  --  2.35* 2.44*   EGFR 47.8* 44.8* 32.4*  --  22.6* 21.6*   GLUCOSE 81 79 72  --   94 114*   CALCIUM 8.7 8.7 7.9*  --  7.3* 8.5*   IONIZED CALCIUM  --   --   --   --   --  1.17   MAGNESIUM 2.4 1.6 1.8  --  2.2 1.6   PHOSPHORUS 4.0  --   --   --  4.2  --    TSH  --   --   --  1.220  --   --          Lab 03/20/23  0534 03/16/23 2115   TOTAL PROTEIN  --  6.0   ALBUMIN 3.5 3.6   GLOBULIN  --  2.4   ALT (SGPT)  --  9   AST (SGOT)  --  18   BILIRUBIN  --  0.4   ALK PHOS  --  77         Lab 03/16/23  2359 03/16/23 2148 03/16/23 2115   HSTROP T 34* 39* 39*   PROTIME  --   --  13.4   INR  --   --  1.03                 Lab 03/16/23 2219   FIO2 21   CARBOXYHEMOGLOBIN (VENOUS) 2.3     Brief Urine Lab Results  (Last result in the past 365 days)      Color   Clarity   Blood   Leuk Est   Nitrite   Protein   CREAT   Urine HCG        03/16/23 2141 Dark Yellow   Turbid   Moderate (2+)   Negative   Negative   >=300 mg/dL (3+)               Microbiology Results (last 10 days)     Procedure Component Value - Date/Time    Blood Culture - Blood, Arm, Left [[id number]]  (Normal) Collected: 03/16/23 2150    Lab Status: Preliminary result Specimen: Blood from Arm, Left Updated: 03/19/23 2246     Blood Culture No growth at 3 days    COVID PRE-OP / PRE-PROCEDURE SCREENING ORDER (NO ISOLATION) - Swab, Nasopharynx [[id number]]  (Normal) Collected: 03/16/23 2142    Lab Status: Final result Specimen: Swab from Nasopharynx Updated: 03/16/23 2243    Narrative:      The following orders were created for panel order COVID PRE-OP / PRE-PROCEDURE SCREENING ORDER (NO ISOLATION) - Swab, Nasopharynx.  Procedure                               Abnormality         Status                     ---------                               -----------         ------                     Respiratory Panel PCR w/...[[id number]]  Normal              Final result                 Please view results for these tests on the individual orders.    Respiratory Panel PCR w/COVID-19(SARS-CoV-2) DERRELL/[facility]/DIAMANTE/PAD/[facility] In-House, NP Swab in UTM/VTM, 3-4 HR TAT  - Swab, Nasopharynx [[id number]]  (Normal) Collected: 03/16/23 2142    Lab Status: Final result Specimen: Swab from Nasopharynx Updated: 03/16/23 2243     ADENOVIRUS, PCR Not Detected     Coronavirus 229E Not Detected     Coronavirus HKU1 Not Detected     Coronavirus NL63 Not Detected     Coronavirus OC43 Not Detected     COVID19 Not Detected     Human Metapneumovirus Not Detected     Human Rhinovirus/Enterovirus Not Detected     Influenza A PCR Not Detected     Influenza B PCR Not Detected     Parainfluenza Virus 1 Not Detected     Parainfluenza Virus 2 Not Detected     Parainfluenza Virus 3 Not Detected     Parainfluenza Virus 4 Not Detected     RSV, PCR Not Detected     Bordetella pertussis pcr Not Detected     Bordetella parapertussis PCR Not Detected     Chlamydophila pneumoniae PCR Not Detected     Mycoplasma pneumo by PCR Not Detected    Narrative:      In the setting of a positive respiratory panel with a viral infection PLUS a negative procalcitonin without other underlying concern for bacterial infection, consider observing off antibiotics or discontinuation of antibiotics and continue supportive care. If the respiratory panel is positive for atypical bacterial infection (Bordetella pertussis, Chlamydophila pneumoniae, or Mycoplasma pneumoniae), consider antibiotic de-escalation to target atypical bacterial infection.    Blood Culture - Blood, Arm, Left [[id number]]  (Normal) Collected: 03/16/23 2140    Lab Status: Preliminary result Specimen: Blood from Arm, Left Updated: 03/19/23 2246     Blood Culture No growth at 3 days          Adult Transthoracic Echo Complete W/ Cont if Necessary Per Protocol    Result Date: 3/18/2023  •  Left ventricular systolic function is normal. Calculated left ventricular EF = 60.3% •  Left ventricular diastolic function was normal. •  Left atrial volume is mildly increased. •  Estimated right ventricular systolic pressure from tricuspid regurgitation is normal (<35 mmHg).      XR Chest 1 View    Result Date: 3/16/2023  XR CHEST 1 VW Date of Exam: 3/16/2023 9:19 PM EDT Indication: Weak/Dizzy/AMS triage protocol. Comparison: October 15, 2022 Findings: The heart is not definitely enlarged. The lungs seem clear. There are no pleural effusions. There are degenerative changes involving the right shoulder area.     Impression: 1.An acute pulmonary process is not apparent. Electronically Signed: Azeem Howe  3/16/2023 9:33 PM EDT  Workstation ID: [id number]              Results for orders placed during the hospital encounter of 03/16/23    Adult Transthoracic Echo Complete W/ Cont if Necessary Per Protocol    Interpretation Summary  •  Left ventricular systolic function is normal. Calculated left ventricular EF = 60.3%  •  Left ventricular diastolic function was normal.  •  Left atrial volume is mildly increased.  •  Estimated right ventricular systolic pressure from tricuspid regurgitation is normal (<35 mmHg).      Plan for Follow-up of Pending Labs/Results: ngtd  Pending Labs     Order Current Status    Blood Culture - Blood, Arm, Left Preliminary result    Blood Culture - Blood, Arm, Left Preliminary result        Discharge Details        Discharge Medications      Changes to Medications      Instructions Start Date   atorvastatin 80 MG tablet  Commonly known as: LIPITOR  What changed: how much to take   80 mg, Oral, Nightly      hydrALAZINE 10 MG tablet  Commonly known as: APRESOLINE  What changed:   · medication strength  · how much to take   10 mg, Oral, 3 Times Daily      levETIRAcetam 500 MG tablet  Commonly known as: KEPPRA  What changed:   · medication strength  · how much to take   500 mg, Oral, 2 Times Daily      metoprolol succinate XL 25 MG 24 hr tablet  Commonly known as: Toprol XL  What changed:   · medication strength  · how much to take   12.5 mg, Oral, Daily         Continue These Medications      Instructions Start Date   amLODIPine 10 MG tablet  Commonly known as:  NORVASC   10 mg, Oral, Daily      aspirin 81 MG EC tablet   81 mg, Oral, Daily      busPIRone 5 MG tablet  Commonly known as: BUSPAR   5 mg, Oral, 2 Times Daily      clopidogrel 75 MG tablet  Commonly known as: PLAVIX   75 mg, Oral, Daily      escitalopram 20 MG tablet  Commonly known as: LEXAPRO   20 mg, Oral, Daily      gabapentin 400 MG capsule  Commonly known as: NEURONTIN   400 mg, Oral, 3 Times Daily      HYDROcodone-acetaminophen 7.5-325 MG per tablet  Commonly known as: NORCO   No dose, route, or frequency recorded.      omeprazole 20 MG capsule  Commonly known as: priLOSEC   20 mg, Oral, 2 Times Daily      traZODone 50 MG tablet  Commonly known as: DESYREL   50 mg, Oral, Nightly      vitamin B-12 1000 MCG tablet  Commonly known as: CYANOCOBALAMIN   1,000 mcg, Oral, Daily         Stop These Medications    losartan 100 MG tablet  Commonly known as: COZAAR     meclizine 25 MG tablet  Commonly known as: ANTIVERT     potassium chloride 10 MEQ CR tablet     promethazine 12.5 MG tablet  Commonly known as: PHENERGAN     spironolactone 25 MG tablet  Commonly known as: ALDACTONE     tiZANidine 4 MG tablet  Commonly known as: ZANAFLEX            No Known Allergies      Discharge Disposition:  Home or Self Care    Diet:  Hospital:  Diet Order   Procedures   • Diet: Regular/House Diet; Texture: Regular Texture (IDDSI 7); Fluid Consistency: Thin (IDDSI 0)       Activity: home w assist      Restrictions or Other Recommendations:  Hold anti-HTN in future if repeat n/v/d severe illness       CODE STATUS:    Code Status and Medical Interventions:   Ordered at: 03/17/23 0027     Code Status (Patient has no pulse and is not breathing):    CPR (Attempt to Resuscitate)     Medical Interventions (Patient has pulse or is breathing):    Full Support     Release to patient:    Routine Release       Future Appointments   Date Time Provider Department Center   5/17/2023  9:00 AM Richard Yepez MD [facility]       Additional  Instructions for the Follow-ups that You Need to Schedule     Discharge Follow-up with PCP   As directed       Currently Documented PCP:    Margarita Lynch MD    PCP Phone Number:    [phone]     Follow Up Details: 1 week f/u with repeat BMP and BP check                     Samantha Bejarano MD  03/20/23      Time Spent on Discharge:  I spent  35 minutes on this discharge activity which included: calling to d/w Dr Singh's office at [facility], face-to-face encounter with the patient, reviewing the data in the system, coordination of the care with the nursing staff as well as consultants, documentation, and entering orders.

## 2023-03-21 LAB
BACTERIA SPEC AEROBE CULT: NORMAL
BACTERIA SPEC AEROBE CULT: NORMAL

## 2023-03-21 NOTE — OUTREACH NOTE
Prep Survey    Flowsheet Row Responses   [facility] facility patient discharged from? [place]   Is LACE score < 7 ? No   Eligibility Readm Mgmt   Discharge diagnosis Hypotension due to drugs (anti-hypertensives) and acute diarrhea   Does the patient have one of the following disease processes/diagnoses(primary or secondary)? Other   Does the patient have Home health ordered? No   Is there a DME ordered? No   Prep survey completed? Yes          Vera DIAMOND - Registered Nurse

## 2023-03-24 ENCOUNTER — READMISSION MANAGEMENT (OUTPATIENT)
Dept: CALL CENTER | Facility: HOSPITAL | Age: 63
End: 2023-03-24
Payer: MEDICARE

## 2023-03-24 NOTE — OUTREACH NOTE
Medical Week 1 Survey    Flowsheet Row Responses   [facility] patient discharged from? [facility]   Does the patient have one of the following disease processes/diagnoses(primary or secondary)? Other   Week 1 attempt successful? Yes   Call start time 1633   Call end time 1640   Discharge diagnosis Hypotension due to drugs (anti-hypertensives) and acute diarrhea   Person spoke with today (if not patient) and relationship Enedina, daughter   Meds reviewed with patient/caregiver? Yes   Is the patient having any side effects they believe may be caused by any medication additions or changes? No   Does the patient have all medications ordered at discharge? Yes   Is the patient taking all medications as directed (includes completed medication regime)? Yes   Does the patient have a primary care provider?  Yes   Does the patient have an appointment with their PCP within 7 days of discharge? Yes   Has the patient kept scheduled appointments due by today? N/A   Has home health visited the patient within 72 hours of discharge? N/A   Psychosocial issues? No   Did the patient receive a copy of their discharge instructions? Yes   Nursing interventions Reviewed instructions with patient   What is the patient's perception of their health status since discharge? Improving   Is the patient/caregiver able to teach back signs and symptoms related to disease process for when to call PCP? Yes   Is the patient/caregiver able to teach back signs and symptoms related to disease process for when to call 911? Yes   Is the patient/caregiver able to teach back the hierarchy of who to call/visit for symptoms/problems? PCP, Specialist, Home health nurse, Urgent Care, ED, 911 Yes   Additional teach back comments daughter states pt weak, poor appetite, has spent day in bed, does not drink many fluids, daughter states has been trying to get pt to take fluids to avoid dehydrations   Week 1 call completed? Yes          Terese DIAMOND - Registered Nurse

## 2023-05-17 ENCOUNTER — APPOINTMENT (OUTPATIENT)
Dept: CT IMAGING | Facility: HOSPITAL | Age: 63
End: 2023-05-17
Payer: MEDICARE

## 2023-05-17 ENCOUNTER — HOSPITAL ENCOUNTER (EMERGENCY)
Facility: HOSPITAL | Age: 63
Discharge: HOME | End: 2023-05-17
Attending: EMERGENCY MEDICINE | Admitting: EMERGENCY MEDICINE
Payer: MEDICARE

## 2023-05-17 VITALS
SYSTOLIC BLOOD PRESSURE: 133 MMHG | TEMPERATURE: 98.1 F | HEIGHT: 60 IN | OXYGEN SATURATION: 98 % | BODY MASS INDEX: 25.52 KG/M2 | WEIGHT: 130 LBS | DIASTOLIC BLOOD PRESSURE: 75 MMHG | RESPIRATION RATE: 20 BRPM | HEART RATE: 85 BPM

## 2023-05-17 DIAGNOSIS — G40.919 BREAKTHROUGH SEIZURE: ICD-10-CM

## 2023-05-17 DIAGNOSIS — R89.9 ABNORMAL LABORATORY TEST: Primary | ICD-10-CM

## 2023-05-17 LAB
ALBUMIN SERPL-MCNC: 3.9 G/DL (ref 3.5–5.2)
ALBUMIN/GLOB SERPL: 1.6 G/DL
ALP SERPL-CCNC: 95 U/L (ref 39–117)
ALT SERPL W P-5'-P-CCNC: 9 U/L (ref 1–33)
ANION GAP SERPL CALCULATED.3IONS-SCNC: 14 MMOL/L (ref 5–15)
AST SERPL-CCNC: 15 U/L (ref 1–32)
BACTERIA UR QL AUTO: ABNORMAL /HPF
BASOPHILS # BLD AUTO: 0.09 10*3/MM3 (ref 0–0.2)
BASOPHILS NFR BLD AUTO: 0.8 % (ref 0–1.5)
BILIRUB SERPL-MCNC: <0.2 MG/DL (ref 0–1.2)
BILIRUB UR QL STRIP: NEGATIVE
BUN SERPL-MCNC: 41 MG/DL (ref 8–23)
BUN/CREAT SERPL: 20.6 (ref 7–25)
CALCIUM SPEC-SCNC: 9.1 MG/DL (ref 8.6–10.5)
CHLORIDE SERPL-SCNC: 98 MMOL/L (ref 98–107)
CLARITY UR: CLEAR
CO2 SERPL-SCNC: 26 MMOL/L (ref 22–29)
COLOR UR: YELLOW
CREAT SERPL-MCNC: 1.99 MG/DL (ref 0.57–1)
D-LACTATE SERPL-SCNC: 1.2 MMOL/L (ref 0.5–2)
DEPRECATED RDW RBC AUTO: 49.6 FL (ref 37–54)
EGFRCR SERPLBLD CKD-EPI 2021: 27.6 ML/MIN/1.73
EOSINOPHIL # BLD AUTO: 0.24 10*3/MM3 (ref 0–0.4)
EOSINOPHIL NFR BLD AUTO: 2.2 % (ref 0.3–6.2)
ERYTHROCYTE [DISTWIDTH] IN BLOOD BY AUTOMATED COUNT: 13.8 % (ref 12.3–15.4)
GLOBULIN UR ELPH-MCNC: 2.5 GM/DL
GLUCOSE SERPL-MCNC: 94 MG/DL (ref 65–99)
GLUCOSE UR STRIP-MCNC: NEGATIVE MG/DL
HCT VFR BLD AUTO: 38.5 % (ref 34–46.6)
HGB BLD-MCNC: 12.5 G/DL (ref 12–15.9)
HGB UR QL STRIP.AUTO: NEGATIVE
HOLD SPECIMEN: NORMAL
HOLD SPECIMEN: NORMAL
HYALINE CASTS UR QL AUTO: ABNORMAL /LPF
IMM GRANULOCYTES # BLD AUTO: 0.03 10*3/MM3 (ref 0–0.05)
IMM GRANULOCYTES NFR BLD AUTO: 0.3 % (ref 0–0.5)
KETONES UR QL STRIP: NEGATIVE
LEUKOCYTE ESTERASE UR QL STRIP.AUTO: NEGATIVE
LYMPHOCYTES # BLD AUTO: 2.68 10*3/MM3 (ref 0.7–3.1)
LYMPHOCYTES NFR BLD AUTO: 24.4 % (ref 19.6–45.3)
MCH RBC QN AUTO: 31.9 PG (ref 26.6–33)
MCHC RBC AUTO-ENTMCNC: 32.5 G/DL (ref 31.5–35.7)
MCV RBC AUTO: 98.2 FL (ref 79–97)
MONOCYTES # BLD AUTO: 0.97 10*3/MM3 (ref 0.1–0.9)
MONOCYTES NFR BLD AUTO: 8.8 % (ref 5–12)
NEUTROPHILS NFR BLD AUTO: 6.98 10*3/MM3 (ref 1.7–7)
NEUTROPHILS NFR BLD AUTO: 63.5 % (ref 42.7–76)
NITRITE UR QL STRIP: NEGATIVE
NRBC BLD AUTO-RTO: 0 /100 WBC (ref 0–0.2)
PH UR STRIP.AUTO: <=5 [PH] (ref 5–8)
PLATELET # BLD AUTO: 441 10*3/MM3 (ref 140–450)
PMV BLD AUTO: 8.6 FL (ref 6–12)
POTASSIUM SERPL-SCNC: 3.5 MMOL/L (ref 3.5–5.2)
PROCALCITONIN SERPL-MCNC: 0.07 NG/ML (ref 0–0.25)
PROT SERPL-MCNC: 6.4 G/DL (ref 6–8.5)
PROT UR QL STRIP: ABNORMAL
RBC # BLD AUTO: 3.92 10*6/MM3 (ref 3.77–5.28)
RBC # UR STRIP: ABNORMAL /HPF
REF LAB TEST METHOD: ABNORMAL
SODIUM SERPL-SCNC: 138 MMOL/L (ref 136–145)
SP GR UR STRIP: 1.01 (ref 1–1.03)
SQUAMOUS #/AREA URNS HPF: ABNORMAL /HPF
UROBILINOGEN UR QL STRIP: ABNORMAL
WBC # UR STRIP: ABNORMAL /HPF
WBC NRBC COR # BLD: 10.99 10*3/MM3 (ref 3.4–10.8)
WHOLE BLOOD HOLD COAG: NORMAL
WHOLE BLOOD HOLD SPECIMEN: NORMAL

## 2023-05-17 PROCEDURE — 83605 ASSAY OF LACTIC ACID: CPT | Performed by: EMERGENCY MEDICINE

## 2023-05-17 PROCEDURE — 85025 COMPLETE CBC W/AUTO DIFF WBC: CPT | Performed by: EMERGENCY MEDICINE

## 2023-05-17 PROCEDURE — 99283 EMERGENCY DEPT VISIT LOW MDM: CPT

## 2023-05-17 PROCEDURE — 84145 PROCALCITONIN (PCT): CPT | Performed by: EMERGENCY MEDICINE

## 2023-05-17 PROCEDURE — 70450 CT HEAD/BRAIN W/O DYE: CPT

## 2023-05-17 PROCEDURE — 80053 COMPREHEN METABOLIC PANEL: CPT | Performed by: EMERGENCY MEDICINE

## 2023-05-17 PROCEDURE — 81001 URINALYSIS AUTO W/SCOPE: CPT | Performed by: EMERGENCY MEDICINE

## 2023-05-17 RX ORDER — SODIUM CHLORIDE 0.9 % (FLUSH) 0.9 %
10 SYRINGE (ML) INJECTION AS NEEDED
Status: DISCONTINUED | OUTPATIENT
Start: 2023-05-17 | End: 2023-05-17 | Stop reason: HOSPADM

## 2023-05-18 LAB — HOLD SPECIMEN: NORMAL

## 2023-05-18 NOTE — ED PROVIDER NOTES
Subjective   History of Present Illness  Mrs. Waller is brought by her son-in-law at the request of her primary care provider.  He tells me she had blood drawn at some point in the last couple of weeks and it has returned showing that her platelet count is 750,000.  They received a call and were told to go to the emergency department for further evaluation.  She denies any acute complaints.  She denies fevers or chills.  She does acknowledge a cough although tells me that is normal for her.  Her son-in-law reports what sounds like a generalized seizure yesterday.  She is on Keppra 750 twice a day for that.  She has no prior history of elevated platelet count.  She was admitted here in March with severe hypotension and possible sepsis.  It was felt to be related to diarrhea and volume depletion in the presence of numerous antihypertensive medications.  She has been admitted with altered mental status last year.  MRI showed multiple prior lacunar infarcts but nothing acute.  She denies any acute neurologic complaints.  She tells me her left leg hurts but acknowledges that has been a long-term issue.  She denies headache but tells me her head felt funny yesterday.  She reports that it feels better today.        Review of Systems   Constitutional: Negative for chills and fever.   HENT: Negative for congestion and rhinorrhea.    Respiratory: Positive for cough. Negative for shortness of breath.    Gastrointestinal: Positive for diarrhea. Negative for abdominal pain.   Neurological: Negative for headaches.   All other systems reviewed and are negative.      Past Medical History:   Diagnosis Date   • Arthritis    • Chronic kidney disease    • Headache    • Heart disease    • Heart failure    • Hypertension    • Low back pain    • Osteoporosis    • Recent urinary tract infection    • Seizures        Allergies   Allergen Reactions   • Prunus Persica Hives       Past Surgical History:   Procedure Laterality Date   •   SECTION     • COLONOSCOPY         Family History   Problem Relation Age of Onset   • Arthritis Mother    • Heart disease Mother    • Hypertension Mother    • Diabetes Mother    • Asthma Father    • Heart disease Father    • Hypertension Father    • Kidney disease Father    • Kidney disease Sister        Social History     Socioeconomic History   • Marital status:    Tobacco Use   • Smoking status: Every Day     Packs/day: 1.50     Types: Cigarettes   • Smokeless tobacco: Never   Vaping Use   • Vaping Use: Never used   Substance and Sexual Activity   • Alcohol use: Yes     Comment: Occ wine   • Drug use: Never   • Sexual activity: Defer           Objective   Physical Exam  Vitals and nursing note reviewed.   Constitutional:       General: She is not in acute distress.     Appearance: Normal appearance.   HENT:      Head: Normocephalic and atraumatic.      Nose: Nose normal. No congestion or rhinorrhea.   Eyes:      General: No scleral icterus.     Comments: Conjunctive are injected   Neck:      Comments: No JVD   Cardiovascular:      Rate and Rhythm: Normal rate and regular rhythm.      Heart sounds: No murmur heard.    No friction rub.   Pulmonary:      Effort: Pulmonary effort is normal.      Breath sounds: Normal breath sounds. No wheezing or rales.   Abdominal:      General: Bowel sounds are normal.      Palpations: Abdomen is soft.      Tenderness: There is no abdominal tenderness. There is no guarding or rebound.   Musculoskeletal:         General: No tenderness.      Cervical back: Normal range of motion and neck supple.      Right lower leg: No edema.      Left lower leg: No edema.   Skin:     General: Skin is warm and dry.      Coloration: Skin is not pale.      Findings: No erythema.   Neurological:      General: No focal deficit present.      Mental Status: She is alert and oriented to person, place, and time.      Motor: No weakness.      Coordination: Coordination normal.    Psychiatric:         Mood and Affect: Mood normal.         Behavior: Behavior normal.         Thought Content: Thought content normal.         Procedures           ED Course  ED Course as of 05/17/23 2226   Wed May 17, 2023   2055 Platelet count is normal.  Labs unremarkable overall.  Still awaiting CMP, CT head, and procalcitonin but she does not appear to have thrombocytosis.  Her son-in-law told me she did not have a neurologist, I pointed out that she had an appointment with Dr. Yepez today that they missed.  He tells me he was unaware of that.  We will reassess after the remainder of her labs come back but it looks like she will be able to go home and follow-up with Dr. Yepez for her seizures. [DT]   2100 Lactic acid is low, CMP shows elevated creatinine, will give fluids.  Will discharge. [DT]   2110 I spoke with her and her son-in-law about findings.  She tells me she drinks lots of water at home and does not think she is dehydrated.  We will cancel the IV fluids.  I have advised her that its likely something else is causing the decline in her kidney function and told her its very important to follow that up with her primary care provider.  I told him to call Dr. Yepez's office in the morning and arrange follow-up. [DT]      ED Course User Index  [DT] Jaylen Velasquez MD                                           Medical Decision Making  She was sent for evaluation of significantly elevated platelet count.  Differential diagnosis of that would include sepsis, malignancy, anemia, and others.  Evaluated for sepsis and ruled out out.  Her platelet count had improved.  Her other complaint was that of a generalized seizure and a funny feeling in her head.  I evaluated that with CT scan of her head.  Differential diagnosis of that would include stroke, intracranial hemorrhage, and others.  These were all ruled out and she was diagnosed with breakthrough seizure.  I reviewed multiple labs as well as outside  records    Abnormal laboratory test: acute illness or injury that poses a threat to life or bodily functions  Breakthrough seizure: complicated acute illness or injury  Amount and/or Complexity of Data Reviewed  Independent Historian: caregiver  External Data Reviewed: notes.  Labs: ordered. Decision-making details documented in ED Course.  Radiology: ordered. Decision-making details documented in ED Course.      Risk  Prescription drug management.          Final diagnoses:   Abnormal laboratory test   Breakthrough seizure       ED Disposition  ED Disposition     ED Disposition   Discharge    Condition   Stable    Comment   --             Richard Yepez MD  [address] [place] [id number] [phone]               Medication List      No changes were made to your prescriptions during this visit.          Jaylen Velasquez MD  05/17/23 6196

## 2023-05-18 NOTE — DISCHARGE INSTRUCTIONS
"      Patient here with his mother    CC: Follow up constipation, encopresis, reflux    HPI: Yannick was seen in this clinic once, 11/30/2021, with a history of chronic abdominal pain which had resolved with the use of famotidine prescribed by the primary care clinic.  He also had a history of ongoing reflux symptoms and constipation.  He was using MiraLAX half a capful as needed.  He was experiencing regular fecal soiling.  I recommended a full bowel cleanout and increasing his daily dose of MiraLAX along with scheduled toilet sits.    Today, mother reports that the cleanout went well.  He has been taking MiraLAX since then, currently at a dose of 1 capful every other day.  He is no longer on the famotidine.  They weaned it after our visit and then after that he began refusing it.  When he was on the famotidine he did not have any abdominal pain or reflux symptoms.    Symptoms  1.  BM: At least twice a day, good amounts of Lee type IV.  No blood.  2.  No fecal soiling since at least May.  3.  Daytime and nocturnal enuresis has resolved  4.  He has generalized abdominal pain at least several days per week.  He will say \"ouch\" after taking only a bite or 2 of food and complains that he is going to throw up.  He will stop eating and the symptom last for about 1 minute.  At one time it was occurring every time he ate beginning in June.  Now it occurs approximately every other meal.  It is not related to any specific type of food.  5.  He has regurgitation of stomach liquid into his throat which she reswallows multiple times per day.  He says it feels like \"throwing up\".  This can occur at random times or after meals.  6.  No vomiting.  7.  No dysphagia.  8.  He has been eating less.  9.  He complains of \"heartburn\" hold in the midsternal area several times per week, usually upon waking.    Review of Systems:  Constitutional: negative for unexplained fevers, anorexia, weight loss or growth deceleration  Eyes:  negative " No driving for 90 days.  Call Dr. Yepez's office tomorrow and request follow-up.  Make sure she is taking her Levetiracetam.  Drink extra fluids over the next few days.   "for redness, eye pain, scleral icterus  HEENT: negative for hearing loss, oral aphthous ulcers, epistaxis  Respiratory: negative for cough  Gastrointestinal: positive for: abdominal pain, nausea, reflux  Genitourinary: negative dysuria, urgency, enuresis  Skin: negative for rash or pruritis  Musculoskeletal: negative joint pain or swelling, muscle weakness    PMHX, Family & Social History: Medical/Social/Family history reviewed with parent today, no changes from previous visit other than noted above.    No Known Allergies  Current Outpatient Medications   Medication Sig     acetaminophen (TYLENOL) 32 mg/mL solution Take 15 mg/kg by mouth every 4 hours as needed for fever or mild pain  (Patient not taking: No sig reported)     albuterol (PROAIR HFA) 108 (90 Base) MCG/ACT inhaler Inhale 2 puffs into the lungs every 4 hours as needed for shortness of breath / dyspnea or wheezing (Patient not taking: No sig reported)     ibuprofen (ADVIL/MOTRIN) 100 MG/5ML suspension Take 10 mg/kg by mouth every 6 hours as needed for fever or moderate pain  (Patient not taking: No sig reported)     Multiple Vitamin (MULTI-VITAMINS PO)  (Patient not taking: No sig reported)     No current facility-administered medications for this visit.         Physical exam:    Vital Signs: Ht 1.135 m (3' 8.69\")   Wt 19.5 kg (42 lb 15.8 oz)   BMI 15.14 kg/m  . (23 %ile (Z= -0.74) based on CDC (Boys, 2-20 Years) Stature-for-age data based on Stature recorded on 7/5/2022. 25 %ile (Z= -0.69) based on CDC (Boys, 2-20 Years) weight-for-age data using vitals from 7/5/2022. Body mass index is 15.14 kg/m . 42 %ile (Z= -0.21) based on CDC (Boys, 2-20 Years) BMI-for-age based on BMI available as of 7/5/2022.)  Constitutional: Healthy, alert and no distress  Head: Normocephalic. No masses, lesions, tenderness or abnormalities  Neck: Neck supple.  EYE: STONE, EOMI  ENT: Ears: Normal position, Nose: No discharge and Mouth: Normal, moist mucous " membranes  Cardiovascular: Heart: Regular rate and rhythm  Respiratory: Lungs clear to auscultation bilaterally.  Gastrointestinal: Abdomen:, Soft, Nontender, Nondistended, Normal bowel sounds, No hepatomegaly, No splenomegaly, Rectal: Deferred  Musculoskeletal: Extremities warm, well perfused.   Skin: No suspicious lesions or rashes  Neurologic: negative  Hematologic/Lymphatic/Immunologic: Normal cervical lymph nodes    Assessment/Plan: 4-year-old boy with a history of chronic constipation and encopresis currently very well controlled on MiraLAX which I recommended to they continue.    He is exhibiting signs of GERD which in the past had responded to famotidine.  I will represcribed the famotidine, 10 mg twice a day.  If symptoms or not better over the next few weeks mother will contact me and we will likely step up therapy to a proton pump inhibitor.  If he does well on the medicine I would like him to continue it until he returns here in about 3 months.    Norman Perez MS, APRN, CPNP  Pediatric Nurse Practitioner  Pediatric Gastroenterology, Hepatology and Nutrition  [facility]:[phone]  [facility] [phone]  [facility]: [phone]    32 Min spent on the date of the encounter in chart review, patient visit, review of tests, documentation and/or discussion with other providers about the issues documented above.    CC  Patient Care Team:  Alice Oneill MD as PCP - General (Pediatrics)  Alice Oneill MD as Assigned PCP  Norman Perez APRN CNP as Assigned Pediatric Specialist Provider  ALICE ONEILL

## 2023-05-21 ENCOUNTER — HOSPITAL ENCOUNTER (OUTPATIENT)
Facility: HOSPITAL | Age: 63
Setting detail: OBSERVATION
LOS: 1 days | Discharge: HOME | End: 2023-05-24
Attending: STUDENT IN AN ORGANIZED HEALTH CARE EDUCATION/TRAINING PROGRAM | Admitting: INTERNAL MEDICINE
Payer: MEDICARE

## 2023-05-21 ENCOUNTER — APPOINTMENT (OUTPATIENT)
Dept: CT IMAGING | Facility: HOSPITAL | Age: 63
End: 2023-05-21
Payer: MEDICARE

## 2023-05-21 ENCOUNTER — APPOINTMENT (OUTPATIENT)
Dept: GENERAL RADIOLOGY | Facility: HOSPITAL | Age: 63
End: 2023-05-21
Payer: MEDICARE

## 2023-05-21 DIAGNOSIS — N17.9 ACUTE RENAL FAILURE, UNSPECIFIED ACUTE RENAL FAILURE TYPE: ICD-10-CM

## 2023-05-21 DIAGNOSIS — I95.9 HYPOTENSION, UNSPECIFIED HYPOTENSION TYPE: Primary | ICD-10-CM

## 2023-05-21 DIAGNOSIS — E87.20 LACTIC ACIDEMIA: ICD-10-CM

## 2023-05-21 DIAGNOSIS — M51.36 DEGENERATIVE DISC DISEASE, LUMBAR: ICD-10-CM

## 2023-05-21 DIAGNOSIS — E86.0 DEHYDRATION: ICD-10-CM

## 2023-05-21 DIAGNOSIS — M50.30 DEGENERATIVE DISC DISEASE, CERVICAL: ICD-10-CM

## 2023-05-21 DIAGNOSIS — M50.20 CERVICAL DISC HERNIATION: ICD-10-CM

## 2023-05-21 LAB
ALBUMIN SERPL-MCNC: 3.2 G/DL (ref 3.5–5.2)
ALBUMIN/GLOB SERPL: 1.5 G/DL
ALP SERPL-CCNC: 72 U/L (ref 39–117)
ALT SERPL W P-5'-P-CCNC: 7 U/L (ref 1–33)
AMPHET+METHAMPHET UR QL: NEGATIVE
AMPHETAMINES UR QL: NEGATIVE
ANION GAP SERPL CALCULATED.3IONS-SCNC: 14 MMOL/L (ref 5–15)
APAP SERPL-MCNC: <5 MCG/ML (ref 0–30)
ARTERIAL PATENCY WRIST A: ABNORMAL
AST SERPL-CCNC: 11 U/L (ref 1–32)
ATMOSPHERIC PRESS: ABNORMAL MM[HG]
B PARAPERT DNA SPEC QL NAA+PROBE: NOT DETECTED
B PERT DNA SPEC QL NAA+PROBE: NOT DETECTED
BACTERIA UR QL AUTO: ABNORMAL /HPF
BARBITURATES UR QL SCN: NEGATIVE
BASE EXCESS BLDA CALC-SCNC: -1 MMOL/L (ref 0–2)
BASOPHILS # BLD AUTO: 0.07 10*3/MM3 (ref 0–0.2)
BASOPHILS NFR BLD AUTO: 0.7 % (ref 0–1.5)
BDY SITE: ABNORMAL
BENZODIAZ UR QL SCN: NEGATIVE
BILIRUB SERPL-MCNC: 0.2 MG/DL (ref 0–1.2)
BILIRUB UR QL STRIP: NEGATIVE
BODY TEMPERATURE: 37 C
BUN BLDA-MCNC: 42 MG/DL (ref 8–26)
BUN SERPL-MCNC: 43 MG/DL (ref 8–23)
BUN/CREAT SERPL: 13 (ref 7–25)
BUPRENORPHINE SERPL-MCNC: NEGATIVE NG/ML
C PNEUM DNA NPH QL NAA+NON-PROBE: NOT DETECTED
CA-I BLDA-SCNC: 1.01 MMOL/L (ref 1.2–1.32)
CALCIUM SPEC-SCNC: 8.1 MG/DL (ref 8.6–10.5)
CANNABINOIDS SERPL QL: NEGATIVE
CHLORIDE BLDA-SCNC: 89 MMOL/L (ref 98–109)
CHLORIDE SERPL-SCNC: 90 MMOL/L (ref 98–107)
CK SERPL-CCNC: 47 U/L (ref 20–180)
CLARITY UR: ABNORMAL
CO2 BLDA-SCNC: 25.3 MMOL/L (ref 22–33)
CO2 BLDA-SCNC: 27 MMOL/L (ref 24–29)
CO2 SERPL-SCNC: 26 MMOL/L (ref 22–29)
COCAINE UR QL: NEGATIVE
COHGB MFR BLD: 3 % (ref 0–2)
COLOR UR: YELLOW
CORTIS SERPL-MCNC: 18.66 MCG/DL
CORTIS SERPL-MCNC: 23.6 MCG/DL
CORTIS SERPL-MCNC: 27.4 MCG/DL
CREAT BLDA-MCNC: 3.6 MG/DL (ref 0.6–1.3)
CREAT SERPL-MCNC: 3.32 MG/DL (ref 0.57–1)
CRP SERPL-MCNC: 0.47 MG/DL (ref 0–0.5)
D-LACTATE SERPL-SCNC: 0.8 MMOL/L (ref 0.5–2)
D-LACTATE SERPL-SCNC: 2.4 MMOL/L (ref 0.5–2)
DEPRECATED RDW RBC AUTO: 49.8 FL (ref 37–54)
EGFRCR SERPLBLD CKD-EPI 2021: 13.6 ML/MIN/1.73
EGFRCR SERPLBLD CKD-EPI 2021: 14.9 ML/MIN/1.73
EOSINOPHIL # BLD AUTO: 0.04 10*3/MM3 (ref 0–0.4)
EOSINOPHIL NFR BLD AUTO: 0.4 % (ref 0.3–6.2)
EPAP: 0
ERYTHROCYTE [DISTWIDTH] IN BLOOD BY AUTOMATED COUNT: 13.6 % (ref 12.3–15.4)
ETHANOL BLD-MCNC: <10 MG/DL (ref 0–10)
FLUAV SUBTYP SPEC NAA+PROBE: NOT DETECTED
FLUBV RNA ISLT QL NAA+PROBE: NOT DETECTED
GLOBULIN UR ELPH-MCNC: 2.1 GM/DL
GLUCOSE BLDC GLUCOMTR-MCNC: 105 MG/DL (ref 70–130)
GLUCOSE BLDC GLUCOMTR-MCNC: 105 MG/DL (ref 70–130)
GLUCOSE SERPL-MCNC: 104 MG/DL (ref 65–99)
GLUCOSE UR STRIP-MCNC: NEGATIVE MG/DL
HADV DNA SPEC NAA+PROBE: NOT DETECTED
HCO3 BLDA-SCNC: 24 MMOL/L (ref 20–26)
HCOV 229E RNA SPEC QL NAA+PROBE: NOT DETECTED
HCOV HKU1 RNA SPEC QL NAA+PROBE: NOT DETECTED
HCOV NL63 RNA SPEC QL NAA+PROBE: NOT DETECTED
HCOV OC43 RNA SPEC QL NAA+PROBE: NOT DETECTED
HCT VFR BLD AUTO: 38.5 % (ref 34–46.6)
HCT VFR BLD CALC: 35 % (ref 38–51)
HCT VFR BLDA CALC: 37 % (ref 38–51)
HGB BLD-MCNC: 12.3 G/DL (ref 12–15.9)
HGB BLDA-MCNC: 11.4 G/DL (ref 14–18)
HGB BLDA-MCNC: 12.6 G/DL (ref 12–17)
HGB UR QL STRIP.AUTO: NEGATIVE
HMPV RNA NPH QL NAA+NON-PROBE: NOT DETECTED
HOLD SPECIMEN: NORMAL
HPIV1 RNA ISLT QL NAA+PROBE: NOT DETECTED
HPIV2 RNA SPEC QL NAA+PROBE: NOT DETECTED
HPIV3 RNA NPH QL NAA+PROBE: NOT DETECTED
HPIV4 P GENE NPH QL NAA+PROBE: NOT DETECTED
HYALINE CASTS UR QL AUTO: ABNORMAL /LPF
IMM GRANULOCYTES # BLD AUTO: 0.04 10*3/MM3 (ref 0–0.05)
IMM GRANULOCYTES NFR BLD AUTO: 0.4 % (ref 0–0.5)
INHALED O2 CONCENTRATION: 21 %
IPAP: 0
KETONES UR QL STRIP: NEGATIVE
LEUKOCYTE ESTERASE UR QL STRIP.AUTO: NEGATIVE
LIPASE SERPL-CCNC: 39 U/L (ref 13–60)
LYMPHOCYTES # BLD AUTO: 1.58 10*3/MM3 (ref 0.7–3.1)
LYMPHOCYTES NFR BLD AUTO: 15.9 % (ref 19.6–45.3)
M PNEUMO IGG SER IA-ACNC: NOT DETECTED
MAGNESIUM SERPL-MCNC: 1.8 MG/DL (ref 1.6–2.4)
MCH RBC QN AUTO: 31.9 PG (ref 26.6–33)
MCHC RBC AUTO-ENTMCNC: 31.9 G/DL (ref 31.5–35.7)
MCV RBC AUTO: 99.7 FL (ref 79–97)
METHADONE UR QL SCN: NEGATIVE
METHGB BLD QL: 0.2 % (ref 0–1.5)
MODALITY: ABNORMAL
MONOCYTES # BLD AUTO: 1.08 10*3/MM3 (ref 0.1–0.9)
MONOCYTES NFR BLD AUTO: 10.9 % (ref 5–12)
NEUTROPHILS NFR BLD AUTO: 7.11 10*3/MM3 (ref 1.7–7)
NEUTROPHILS NFR BLD AUTO: 71.7 % (ref 42.7–76)
NITRITE UR QL STRIP: NEGATIVE
NOTE: ABNORMAL
NRBC BLD AUTO-RTO: 0 /100 WBC (ref 0–0.2)
NT-PROBNP SERPL-MCNC: 4393 PG/ML (ref 0–900)
OPIATES UR QL: NEGATIVE
OXYCODONE UR QL SCN: NEGATIVE
OXYHGB MFR BLDV: 91.3 % (ref 94–99)
PAW @ PEAK INSP FLOW SETTING VENT: 0 CMH2O
PCO2 BLDA: 40.6 MM HG (ref 35–45)
PCO2 TEMP ADJ BLD: 40.6 MM HG (ref 35–45)
PCP UR QL SCN: NEGATIVE
PH BLDA: 7.38 PH UNITS (ref 7.35–7.45)
PH UR STRIP.AUTO: 5.5 [PH] (ref 5–8)
PH, TEMP CORRECTED: 7.38 PH UNITS
PHOSPHATE SERPL-MCNC: 5.6 MG/DL (ref 2.5–4.5)
PLATELET # BLD AUTO: 417 10*3/MM3 (ref 140–450)
PMV BLD AUTO: 9 FL (ref 6–12)
PO2 BLDA: 72.4 MM HG (ref 83–108)
PO2 TEMP ADJ BLD: 72.4 MM HG (ref 83–108)
POTASSIUM BLDA-SCNC: 3.2 MMOL/L (ref 3.5–4.9)
POTASSIUM SERPL-SCNC: 3.3 MMOL/L (ref 3.5–5.2)
POTASSIUM SERPL-SCNC: 3.9 MMOL/L (ref 3.5–5.2)
PROCALCITONIN SERPL-MCNC: 0.32 NG/ML (ref 0–0.25)
PROPOXYPH UR QL: NEGATIVE
PROT SERPL-MCNC: 5.3 G/DL (ref 6–8.5)
PROT UR QL STRIP: ABNORMAL
RBC # BLD AUTO: 3.86 10*6/MM3 (ref 3.77–5.28)
RBC # UR STRIP: ABNORMAL /HPF
REF LAB TEST METHOD: ABNORMAL
RHINOVIRUS RNA SPEC NAA+PROBE: NOT DETECTED
RSV RNA NPH QL NAA+NON-PROBE: NOT DETECTED
SALICYLATES SERPL-MCNC: <0.3 MG/DL
SARS-COV-2 RNA NPH QL NAA+NON-PROBE: NOT DETECTED
SODIUM BLD-SCNC: 128 MMOL/L (ref 138–146)
SODIUM SERPL-SCNC: 130 MMOL/L (ref 136–145)
SP GR UR STRIP: 1.01 (ref 1–1.03)
SQUAMOUS #/AREA URNS HPF: ABNORMAL /HPF
T4 FREE SERPL-MCNC: 1.52 NG/DL (ref 0.93–1.7)
TOTAL RATE: 0 BREATHS/MINUTE
TRICYCLICS UR QL SCN: NEGATIVE
TROPONIN T SERPL HS-MCNC: 36 NG/L
TSH SERPL DL<=0.05 MIU/L-ACNC: 3.41 UIU/ML (ref 0.27–4.2)
UROBILINOGEN UR QL STRIP: ABNORMAL
VIT B12 BLD-MCNC: 403 PG/ML (ref 211–946)
WBC # UR STRIP: ABNORMAL /HPF
WBC NRBC COR # BLD: 9.92 10*3/MM3 (ref 3.4–10.8)
WHOLE BLOOD HOLD COAG: NORMAL
WHOLE BLOOD HOLD SPECIMEN: NORMAL

## 2023-05-21 PROCEDURE — 93005 ELECTROCARDIOGRAM TRACING: CPT | Performed by: STUDENT IN AN ORGANIZED HEALTH CARE EDUCATION/TRAINING PROGRAM

## 2023-05-21 PROCEDURE — 87040 BLOOD CULTURE FOR BACTERIA: CPT | Performed by: STUDENT IN AN ORGANIZED HEALTH CARE EDUCATION/TRAINING PROGRAM

## 2023-05-21 PROCEDURE — 99285 EMERGENCY DEPT VISIT HI MDM: CPT

## 2023-05-21 PROCEDURE — 84439 ASSAY OF FREE THYROXINE: CPT | Performed by: STUDENT IN AN ORGANIZED HEALTH CARE EDUCATION/TRAINING PROGRAM

## 2023-05-21 PROCEDURE — 84443 ASSAY THYROID STIM HORMONE: CPT | Performed by: STUDENT IN AN ORGANIZED HEALTH CARE EDUCATION/TRAINING PROGRAM

## 2023-05-21 PROCEDURE — 83880 ASSAY OF NATRIURETIC PEPTIDE: CPT | Performed by: STUDENT IN AN ORGANIZED HEALTH CARE EDUCATION/TRAINING PROGRAM

## 2023-05-21 PROCEDURE — 25010000002 MAGNESIUM SULFATE IN D5W 1G/100ML (PREMIX) 1-5 GM/100ML-% SOLUTION: Performed by: STUDENT IN AN ORGANIZED HEALTH CARE EDUCATION/TRAINING PROGRAM

## 2023-05-21 PROCEDURE — 25010000002 ALBUMIN HUMAN 25% PER 50 ML: Performed by: STUDENT IN AN ORGANIZED HEALTH CARE EDUCATION/TRAINING PROGRAM

## 2023-05-21 PROCEDURE — 84484 ASSAY OF TROPONIN QUANT: CPT | Performed by: STUDENT IN AN ORGANIZED HEALTH CARE EDUCATION/TRAINING PROGRAM

## 2023-05-21 PROCEDURE — 99223 1ST HOSP IP/OBS HIGH 75: CPT | Performed by: INTERNAL MEDICINE

## 2023-05-21 PROCEDURE — 80047 BASIC METABLC PNL IONIZED CA: CPT

## 2023-05-21 PROCEDURE — 82533 TOTAL CORTISOL: CPT | Performed by: INTERNAL MEDICINE

## 2023-05-21 PROCEDURE — 96375 TX/PRO/DX INJ NEW DRUG ADDON: CPT

## 2023-05-21 PROCEDURE — 82550 ASSAY OF CK (CPK): CPT | Performed by: STUDENT IN AN ORGANIZED HEALTH CARE EDUCATION/TRAINING PROGRAM

## 2023-05-21 PROCEDURE — 82607 VITAMIN B-12: CPT | Performed by: INTERNAL MEDICINE

## 2023-05-21 PROCEDURE — 80179 DRUG ASSAY SALICYLATE: CPT | Performed by: STUDENT IN AN ORGANIZED HEALTH CARE EDUCATION/TRAINING PROGRAM

## 2023-05-21 PROCEDURE — 0202U NFCT DS 22 TRGT SARS-COV-2: CPT | Performed by: STUDENT IN AN ORGANIZED HEALTH CARE EDUCATION/TRAINING PROGRAM

## 2023-05-21 PROCEDURE — 25010000002 CALCIUM GLUCONATE-NACL 1-0.675 GM/50ML-% SOLUTION: Performed by: INTERNAL MEDICINE

## 2023-05-21 PROCEDURE — 36415 COLL VENOUS BLD VENIPUNCTURE: CPT

## 2023-05-21 PROCEDURE — 80306 DRUG TEST PRSMV INSTRMNT: CPT | Performed by: STUDENT IN AN ORGANIZED HEALTH CARE EDUCATION/TRAINING PROGRAM

## 2023-05-21 PROCEDURE — 87150 DNA/RNA AMPLIFIED PROBE: CPT | Performed by: STUDENT IN AN ORGANIZED HEALTH CARE EDUCATION/TRAINING PROGRAM

## 2023-05-21 PROCEDURE — 83735 ASSAY OF MAGNESIUM: CPT | Performed by: STUDENT IN AN ORGANIZED HEALTH CARE EDUCATION/TRAINING PROGRAM

## 2023-05-21 PROCEDURE — 81001 URINALYSIS AUTO W/SCOPE: CPT | Performed by: STUDENT IN AN ORGANIZED HEALTH CARE EDUCATION/TRAINING PROGRAM

## 2023-05-21 PROCEDURE — 83690 ASSAY OF LIPASE: CPT | Performed by: STUDENT IN AN ORGANIZED HEALTH CARE EDUCATION/TRAINING PROGRAM

## 2023-05-21 PROCEDURE — 96372 THER/PROPH/DIAG INJ SC/IM: CPT

## 2023-05-21 PROCEDURE — 84100 ASSAY OF PHOSPHORUS: CPT | Performed by: STUDENT IN AN ORGANIZED HEALTH CARE EDUCATION/TRAINING PROGRAM

## 2023-05-21 PROCEDURE — 83605 ASSAY OF LACTIC ACID: CPT | Performed by: STUDENT IN AN ORGANIZED HEALTH CARE EDUCATION/TRAINING PROGRAM

## 2023-05-21 PROCEDURE — 85014 HEMATOCRIT: CPT

## 2023-05-21 PROCEDURE — 84145 PROCALCITONIN (PCT): CPT | Performed by: STUDENT IN AN ORGANIZED HEALTH CARE EDUCATION/TRAINING PROGRAM

## 2023-05-21 PROCEDURE — 80143 DRUG ASSAY ACETAMINOPHEN: CPT | Performed by: STUDENT IN AN ORGANIZED HEALTH CARE EDUCATION/TRAINING PROGRAM

## 2023-05-21 PROCEDURE — P9047 ALBUMIN (HUMAN), 25%, 50ML: HCPCS | Performed by: STUDENT IN AN ORGANIZED HEALTH CARE EDUCATION/TRAINING PROGRAM

## 2023-05-21 PROCEDURE — 80053 COMPREHEN METABOLIC PANEL: CPT | Performed by: STUDENT IN AN ORGANIZED HEALTH CARE EDUCATION/TRAINING PROGRAM

## 2023-05-21 PROCEDURE — 82805 BLOOD GASES W/O2 SATURATION: CPT

## 2023-05-21 PROCEDURE — 0 POTASSIUM CHLORIDE 10 MEQ/100ML SOLUTION: Performed by: STUDENT IN AN ORGANIZED HEALTH CARE EDUCATION/TRAINING PROGRAM

## 2023-05-21 PROCEDURE — 25010000002 PIPERACILLIN SOD-TAZOBACTAM PER 1 G: Performed by: STUDENT IN AN ORGANIZED HEALTH CARE EDUCATION/TRAINING PROGRAM

## 2023-05-21 PROCEDURE — 86140 C-REACTIVE PROTEIN: CPT | Performed by: STUDENT IN AN ORGANIZED HEALTH CARE EDUCATION/TRAINING PROGRAM

## 2023-05-21 PROCEDURE — 82077 ASSAY SPEC XCP UR&BREATH IA: CPT | Performed by: STUDENT IN AN ORGANIZED HEALTH CARE EDUCATION/TRAINING PROGRAM

## 2023-05-21 PROCEDURE — 96366 THER/PROPH/DIAG IV INF ADDON: CPT

## 2023-05-21 PROCEDURE — 85025 COMPLETE CBC W/AUTO DIFF WBC: CPT | Performed by: STUDENT IN AN ORGANIZED HEALTH CARE EDUCATION/TRAINING PROGRAM

## 2023-05-21 PROCEDURE — 87147 CULTURE TYPE IMMUNOLOGIC: CPT | Performed by: STUDENT IN AN ORGANIZED HEALTH CARE EDUCATION/TRAINING PROGRAM

## 2023-05-21 PROCEDURE — 71045 X-RAY EXAM CHEST 1 VIEW: CPT

## 2023-05-21 PROCEDURE — 96361 HYDRATE IV INFUSION ADD-ON: CPT

## 2023-05-21 PROCEDURE — 25010000002 VANCOMYCIN 10 G RECONSTITUTED SOLUTION: Performed by: STUDENT IN AN ORGANIZED HEALTH CARE EDUCATION/TRAINING PROGRAM

## 2023-05-21 PROCEDURE — 82948 REAGENT STRIP/BLOOD GLUCOSE: CPT

## 2023-05-21 PROCEDURE — 25010000002 COSYNTROPIN PER 0.25 MG: Performed by: INTERNAL MEDICINE

## 2023-05-21 PROCEDURE — 83050 HGB METHEMOGLOBIN QUAN: CPT

## 2023-05-21 PROCEDURE — 74176 CT ABD & PELVIS W/O CONTRAST: CPT

## 2023-05-21 PROCEDURE — 96367 TX/PROPH/DG ADDL SEQ IV INF: CPT

## 2023-05-21 PROCEDURE — 36600 WITHDRAWAL OF ARTERIAL BLOOD: CPT

## 2023-05-21 PROCEDURE — 84132 ASSAY OF SERUM POTASSIUM: CPT | Performed by: STUDENT IN AN ORGANIZED HEALTH CARE EDUCATION/TRAINING PROGRAM

## 2023-05-21 PROCEDURE — 96365 THER/PROPH/DIAG IV INF INIT: CPT

## 2023-05-21 PROCEDURE — 82024 ASSAY OF ACTH: CPT | Performed by: INTERNAL MEDICINE

## 2023-05-21 PROCEDURE — 82375 ASSAY CARBOXYHB QUANT: CPT

## 2023-05-21 PROCEDURE — 25010000002 HEPARIN (PORCINE) PER 1000 UNITS: Performed by: INTERNAL MEDICINE

## 2023-05-21 PROCEDURE — 25010000002 ONDANSETRON PER 1 MG: Performed by: STUDENT IN AN ORGANIZED HEALTH CARE EDUCATION/TRAINING PROGRAM

## 2023-05-21 RX ORDER — FOLIC ACID 1 MG/1
1 TABLET ORAL DAILY
COMMUNITY

## 2023-05-21 RX ORDER — ATORVASTATIN CALCIUM 40 MG/1
80 TABLET, FILM COATED ORAL NIGHTLY
Status: DISCONTINUED | OUTPATIENT
Start: 2023-05-21 | End: 2023-05-24 | Stop reason: HOSPADM

## 2023-05-21 RX ORDER — ALBUTEROL SULFATE 2.5 MG/3ML
2.5 SOLUTION RESPIRATORY (INHALATION) EVERY 6 HOURS PRN
Status: DISCONTINUED | OUTPATIENT
Start: 2023-05-21 | End: 2023-05-24 | Stop reason: HOSPADM

## 2023-05-21 RX ORDER — METOPROLOL SUCCINATE 50 MG/1
50 TABLET, EXTENDED RELEASE ORAL DAILY
COMMUNITY

## 2023-05-21 RX ORDER — ONDANSETRON 2 MG/ML
4 INJECTION INTRAMUSCULAR; INTRAVENOUS ONCE
Status: COMPLETED | OUTPATIENT
Start: 2023-05-21 | End: 2023-05-21

## 2023-05-21 RX ORDER — CALCIUM GLUCONATE 20 MG/ML
1 INJECTION, SOLUTION INTRAVENOUS ONCE
Status: COMPLETED | OUTPATIENT
Start: 2023-05-21 | End: 2023-05-21

## 2023-05-21 RX ORDER — ICOSAPENT ETHYL 1000 MG/1
2 CAPSULE ORAL 2 TIMES DAILY WITH MEALS
COMMUNITY

## 2023-05-21 RX ORDER — HYDRALAZINE HYDROCHLORIDE 25 MG/1
25 TABLET, FILM COATED ORAL DAILY PRN
COMMUNITY
End: 2023-05-24 | Stop reason: HOSPADM

## 2023-05-21 RX ORDER — ACETAMINOPHEN 500 MG
1000 TABLET ORAL ONCE
Status: COMPLETED | OUTPATIENT
Start: 2023-05-21 | End: 2023-05-21

## 2023-05-21 RX ORDER — ALBUMIN (HUMAN) 12.5 G/50ML
12.5 SOLUTION INTRAVENOUS ONCE
Status: COMPLETED | OUTPATIENT
Start: 2023-05-21 | End: 2023-05-21

## 2023-05-21 RX ORDER — SODIUM CHLORIDE 9 MG/ML
125 INJECTION, SOLUTION INTRAVENOUS CONTINUOUS
Status: ACTIVE | OUTPATIENT
Start: 2023-05-21 | End: 2023-05-22

## 2023-05-21 RX ORDER — FOLIC ACID 1 MG/1
1 TABLET ORAL DAILY
Status: DISCONTINUED | OUTPATIENT
Start: 2023-05-21 | End: 2023-05-24 | Stop reason: HOSPADM

## 2023-05-21 RX ORDER — ACETAMINOPHEN 325 MG/1
650 TABLET ORAL EVERY 4 HOURS PRN
Status: DISCONTINUED | OUTPATIENT
Start: 2023-05-21 | End: 2023-05-24 | Stop reason: HOSPADM

## 2023-05-21 RX ORDER — COSYNTROPIN 0.25 MG/ML
0.25 INJECTION, POWDER, FOR SOLUTION INTRAMUSCULAR; INTRAVENOUS ONCE
Status: COMPLETED | OUTPATIENT
Start: 2023-05-21 | End: 2023-05-21

## 2023-05-21 RX ORDER — BISACODYL 5 MG/1
5 TABLET, DELAYED RELEASE ORAL DAILY PRN
Status: DISCONTINUED | OUTPATIENT
Start: 2023-05-21 | End: 2023-05-24 | Stop reason: HOSPADM

## 2023-05-21 RX ORDER — SODIUM CHLORIDE 9 MG/ML
40 INJECTION, SOLUTION INTRAVENOUS AS NEEDED
Status: DISCONTINUED | OUTPATIENT
Start: 2023-05-21 | End: 2023-05-24 | Stop reason: HOSPADM

## 2023-05-21 RX ORDER — SODIUM CHLORIDE 0.9 % (FLUSH) 0.9 %
10 SYRINGE (ML) INJECTION AS NEEDED
Status: DISCONTINUED | OUTPATIENT
Start: 2023-05-21 | End: 2023-05-24 | Stop reason: HOSPADM

## 2023-05-21 RX ORDER — POLYETHYLENE GLYCOL 3350 17 G/17G
17 POWDER, FOR SOLUTION ORAL DAILY PRN
Status: DISCONTINUED | OUTPATIENT
Start: 2023-05-21 | End: 2023-05-24 | Stop reason: HOSPADM

## 2023-05-21 RX ORDER — MIRTAZAPINE 15 MG/1
15 TABLET, FILM COATED ORAL NIGHTLY
COMMUNITY

## 2023-05-21 RX ORDER — MAGNESIUM SULFATE 1 G/100ML
1 INJECTION INTRAVENOUS ONCE
Status: COMPLETED | OUTPATIENT
Start: 2023-05-21 | End: 2023-05-21

## 2023-05-21 RX ORDER — SODIUM CHLORIDE, SODIUM LACTATE, POTASSIUM CHLORIDE, CALCIUM CHLORIDE 600; 310; 30; 20 MG/100ML; MG/100ML; MG/100ML; MG/100ML
125 INJECTION, SOLUTION INTRAVENOUS CONTINUOUS
Status: DISCONTINUED | OUTPATIENT
Start: 2023-05-21 | End: 2023-05-21

## 2023-05-21 RX ORDER — SODIUM CHLORIDE 0.9 % (FLUSH) 0.9 %
10 SYRINGE (ML) INJECTION EVERY 12 HOURS SCHEDULED
Status: DISCONTINUED | OUTPATIENT
Start: 2023-05-21 | End: 2023-05-24 | Stop reason: HOSPADM

## 2023-05-21 RX ORDER — HEPARIN SODIUM 5000 [USP'U]/ML
5000 INJECTION, SOLUTION INTRAVENOUS; SUBCUTANEOUS EVERY 8 HOURS SCHEDULED
Status: DISCONTINUED | OUTPATIENT
Start: 2023-05-21 | End: 2023-05-24 | Stop reason: HOSPADM

## 2023-05-21 RX ORDER — ESCITALOPRAM OXALATE 20 MG/1
20 TABLET ORAL DAILY
Status: DISCONTINUED | OUTPATIENT
Start: 2023-05-21 | End: 2023-05-24 | Stop reason: HOSPADM

## 2023-05-21 RX ORDER — MIDODRINE HYDROCHLORIDE 5 MG/1
2.5 TABLET ORAL ONCE
Status: COMPLETED | OUTPATIENT
Start: 2023-05-21 | End: 2023-05-21

## 2023-05-21 RX ORDER — ACETAMINOPHEN 650 MG/1
650 SUPPOSITORY RECTAL EVERY 4 HOURS PRN
Status: DISCONTINUED | OUTPATIENT
Start: 2023-05-21 | End: 2023-05-24 | Stop reason: HOSPADM

## 2023-05-21 RX ORDER — BISACODYL 10 MG
10 SUPPOSITORY, RECTAL RECTAL DAILY PRN
Status: DISCONTINUED | OUTPATIENT
Start: 2023-05-21 | End: 2023-05-24 | Stop reason: HOSPADM

## 2023-05-21 RX ORDER — POTASSIUM CHLORIDE 7.45 MG/ML
10 INJECTION INTRAVENOUS
Status: DISPENSED | OUTPATIENT
Start: 2023-05-21 | End: 2023-05-21

## 2023-05-21 RX ORDER — ALBUTEROL SULFATE 90 UG/1
2 AEROSOL, METERED RESPIRATORY (INHALATION) EVERY 4 HOURS PRN
COMMUNITY

## 2023-05-21 RX ORDER — CLOPIDOGREL BISULFATE 75 MG/1
75 TABLET ORAL DAILY
Status: DISCONTINUED | OUTPATIENT
Start: 2023-05-21 | End: 2023-05-24 | Stop reason: HOSPADM

## 2023-05-21 RX ORDER — LOSARTAN POTASSIUM 25 MG/1
25 TABLET ORAL DAILY
COMMUNITY
End: 2023-05-24 | Stop reason: HOSPADM

## 2023-05-21 RX ORDER — SPIRONOLACTONE 25 MG/1
25 TABLET ORAL DAILY
COMMUNITY
End: 2023-05-24 | Stop reason: HOSPADM

## 2023-05-21 RX ORDER — AMOXICILLIN 250 MG
2 CAPSULE ORAL 2 TIMES DAILY
Status: DISCONTINUED | OUTPATIENT
Start: 2023-05-21 | End: 2023-05-24 | Stop reason: HOSPADM

## 2023-05-21 RX ORDER — LEVETIRACETAM 500 MG/1
500 TABLET ORAL EVERY 12 HOURS SCHEDULED
Status: DISCONTINUED | OUTPATIENT
Start: 2023-05-21 | End: 2023-05-24 | Stop reason: HOSPADM

## 2023-05-21 RX ORDER — NICOTINE 21 MG/24HR
1 PATCH, TRANSDERMAL 24 HOURS TRANSDERMAL
Status: DISCONTINUED | OUTPATIENT
Start: 2023-05-21 | End: 2023-05-24 | Stop reason: HOSPADM

## 2023-05-21 RX ORDER — TRAZODONE HYDROCHLORIDE 50 MG/1
50 TABLET ORAL NIGHTLY
Status: DISCONTINUED | OUTPATIENT
Start: 2023-05-21 | End: 2023-05-24 | Stop reason: HOSPADM

## 2023-05-21 RX ORDER — ACETAMINOPHEN 160 MG/5ML
650 SOLUTION ORAL EVERY 4 HOURS PRN
Status: DISCONTINUED | OUTPATIENT
Start: 2023-05-21 | End: 2023-05-24 | Stop reason: HOSPADM

## 2023-05-21 RX ORDER — MIRTAZAPINE 15 MG/1
15 TABLET, FILM COATED ORAL NIGHTLY
Status: DISCONTINUED | OUTPATIENT
Start: 2023-05-21 | End: 2023-05-24 | Stop reason: HOSPADM

## 2023-05-21 RX ADMIN — SODIUM CHLORIDE, POTASSIUM CHLORIDE, SODIUM LACTATE AND CALCIUM CHLORIDE 125 ML/HR: 600; 310; 30; 20 INJECTION, SOLUTION INTRAVENOUS at 15:45

## 2023-05-21 RX ADMIN — ESCITALOPRAM OXALATE 20 MG: 20 TABLET ORAL at 14:04

## 2023-05-21 RX ADMIN — MAGNESIUM SULFATE HEPTAHYDRATE 1 G: 1 INJECTION, SOLUTION INTRAVENOUS at 05:34

## 2023-05-21 RX ADMIN — SODIUM CHLORIDE 125 ML/HR: 9 INJECTION, SOLUTION INTRAVENOUS at 11:01

## 2023-05-21 RX ADMIN — ONDANSETRON 4 MG: 2 INJECTION INTRAMUSCULAR; INTRAVENOUS at 03:11

## 2023-05-21 RX ADMIN — COSYNTROPIN 0.25 MG: 0.25 INJECTION, POWDER, LYOPHILIZED, FOR SOLUTION INTRAMUSCULAR; INTRAVENOUS at 10:37

## 2023-05-21 RX ADMIN — SODIUM CHLORIDE 1000 ML: 9 INJECTION, SOLUTION INTRAVENOUS at 02:28

## 2023-05-21 RX ADMIN — LEVETIRACETAM 500 MG: 500 TABLET, FILM COATED ORAL at 22:06

## 2023-05-21 RX ADMIN — POTASSIUM CHLORIDE 10 MEQ: 7.46 INJECTION, SOLUTION INTRAVENOUS at 08:11

## 2023-05-21 RX ADMIN — ACETAMINOPHEN 1000 MG: 500 TABLET ORAL at 03:12

## 2023-05-21 RX ADMIN — HEPARIN SODIUM 5000 UNITS: 5000 INJECTION INTRAVENOUS; SUBCUTANEOUS at 14:05

## 2023-05-21 RX ADMIN — Medication 10 ML: at 21:51

## 2023-05-21 RX ADMIN — ALBUMIN (HUMAN) 12.5 G: 0.25 INJECTION, SOLUTION INTRAVENOUS at 05:05

## 2023-05-21 RX ADMIN — TRAZODONE HYDROCHLORIDE 50 MG: 50 TABLET ORAL at 21:51

## 2023-05-21 RX ADMIN — TAZOBACTAM SODIUM AND PIPERACILLIN SODIUM 3.38 G: 375; 3 INJECTION, SOLUTION INTRAVENOUS at 02:32

## 2023-05-21 RX ADMIN — MIDODRINE HYDROCHLORIDE 2.5 MG: 5 TABLET ORAL at 05:09

## 2023-05-21 RX ADMIN — HEPARIN SODIUM 5000 UNITS: 5000 INJECTION INTRAVENOUS; SUBCUTANEOUS at 21:50

## 2023-05-21 RX ADMIN — SODIUM CHLORIDE 1000 ML: 9 INJECTION, SOLUTION INTRAVENOUS at 03:48

## 2023-05-21 RX ADMIN — SODIUM CHLORIDE, POTASSIUM CHLORIDE, SODIUM LACTATE AND CALCIUM CHLORIDE 125 ML/HR: 600; 310; 30; 20 INJECTION, SOLUTION INTRAVENOUS at 05:37

## 2023-05-21 RX ADMIN — LEVETIRACETAM 500 MG: 500 TABLET, FILM COATED ORAL at 14:04

## 2023-05-21 RX ADMIN — CLOPIDOGREL BISULFATE 75 MG: 75 TABLET ORAL at 14:04

## 2023-05-21 RX ADMIN — ATORVASTATIN CALCIUM 80 MG: 40 TABLET, FILM COATED ORAL at 21:50

## 2023-05-21 RX ADMIN — MIRTAZAPINE 15 MG: 15 TABLET, FILM COATED ORAL at 21:51

## 2023-05-21 RX ADMIN — Medication 1 PATCH: at 16:29

## 2023-05-21 RX ADMIN — Medication 10 ML: at 12:21

## 2023-05-21 RX ADMIN — FOLIC ACID 1 MG: 1 TABLET ORAL at 14:04

## 2023-05-21 RX ADMIN — CALCIUM GLUCONATE 1 G: 20 INJECTION, SOLUTION INTRAVENOUS at 12:20

## 2023-05-21 RX ADMIN — POTASSIUM CHLORIDE 10 MEQ: 7.46 INJECTION, SOLUTION INTRAVENOUS at 05:32

## 2023-05-21 RX ADMIN — Medication 81 MG: at 14:04

## 2023-05-21 RX ADMIN — VANCOMYCIN HYDROCHLORIDE 1250 MG: 10 INJECTION, POWDER, LYOPHILIZED, FOR SOLUTION INTRAVENOUS at 03:07

## 2023-05-21 RX ADMIN — SENNOSIDES AND DOCUSATE SODIUM 2 TABLET: 50; 8.6 TABLET ORAL at 12:20

## 2023-05-21 NOTE — PROGRESS NOTES
"                    Clinical Nutrition     Patient Name: Gabriela Waller  YOB: 1960  MRN: [id number]  Date of Encounter: 05/21/23 13:13 EDT  Admission date: 5/21/2023    Reason for Visit   MST score 2+, \"Unsure\" unintentional weight loss    EMR Reviewed: Yes     Diet Nutrition Related History:      Per H&P pt noted for having frequent bowel movements/diarrhea a few days ago. Pt reports having UBW of 140# ~ 6 months ago. She states she now weighs 120#. She declines any changes in PO intake. Pt is unsure on why she has had wt loss. Pt declines diarrhea/vommiting. No difficulty chewing/swallowing per pt. NKFA. Pt agreeable to ONS.     Anthropometrics   Height: Height: 152.4 cm (60\")  Admission Weight:   Flowsheet Rows    Flowsheet Row First Filed Value   Admission Height 152.4 cm (60\") Documented at 05/21/2023 0152   Admission Weight 63.5 kg (140 lb) Documented at 05/21/2023 0152        Last Filed Weight:Weight: 62.1 kg (136 lb 12.8 oz) (05/21/23 0922)  Weight Method: Stated  BMI: BMI (Calculated): 26.7  BMI classification: Overweight: 25.0-29.9kg/m2    IBW: Ideal Body Weight (IBW) (kg): 45.86  EMR wts   Weight      Weight (kg) Weight (lbs) Weight Method   10/15/2022 63.4 kg  139 lb 12.4 oz  --     56.7 kg  125 lb  Bed scale    3/16/2023 56.7 kg  125 lb  Stated     57.6 kg  126 lb 15.8 oz  Bed scale    3/17/2023 58.5 kg  128 lb 15.5 oz     3/20/2023 58.741 kg  129 lb 8 oz  Bed scale    5/17/2023 58.968 kg  130 lb  Stated    5/21/2023 63.504 kg  140 lb  Stated     62.052 kg  136 lb 12.8 oz     UBW: 140# per pt 6 months ago  Weight change: Will order wt to admit wt is stated and no wt method    % change:    Time frame:       Current Nutrition Prescription     Diet: Regular/House Diet; Texture: Regular Texture (IDDSI 7); Fluid Consistency: Thin (IDDSI 0)    ONS: Will order Boost Plus daily     Average Intake from Charting: No intakes recorded at this time    Intake History:     Intake " Category  N/A    Actions   Appropriateness for MSA:  Criteria for further malnutrition exam not met at this time. However pending order wt pt may qualify. Follow per protocol.    Interventions:   Follow treatment progress, Care plan reviewed, Interview for preferences, Encourage intake, Supplement provided, order wt to determine accurate current wt    Rosalie Andersen,   Time Spent: 25 min.

## 2023-05-21 NOTE — PLAN OF CARE
Goal Outcome Evaluation:              Outcome Evaluation: Patient admitted to floor this shift. No complaints of pain. Normal saline infusing at 125ml/hr. Patient reeducated about fall risk, call light use, safety.

## 2023-05-21 NOTE — Clinical Note
Level of Care: Telemetry [5]   Diagnosis: Hypotension [019138]   Certification: I Certify That Inpatient Hospital Services Are Medically Necessary For Greater Than 2 Midnights

## 2023-05-21 NOTE — H&P
[facility] Medicine Services  HISTORY AND PHYSICAL    Patient Name: Gabriela Waller  : 1960  MRN: [id number]  Primary Care Physician: System, Provider Not In  Date of admission: 2023      Subjective   Subjective     Chief Complaint:  Weakness and hypotension    HPI:  Gabriela Waller is a 62 y.o. female h/o essential hypertension, CKD stage III, old stroke, and seizure disorder with recent hospitalization in 2023 for hypovolemic shock that was felt to be secondary to severe diarrhea and polypharmacy at that time, improved and was discharged home and presented to the hospital today with weakness and hypotension    She lives with her daughter and son-in-law and usually manage her daily medications on her own.  Patient reported that she was having frequent bowel movements/diarrhea few days ago and over the last couple days, she has been feeling more weak.  Today, she got out of the bed and felt very dizzy and had multiple falls which prompted her son-in-law to call 911 who brought her to the hospital to be evaluated.  She denied any chest pain, shortness of breath, fever, cough, burning sensation in the urine, current nausea, vomiting or diarrhea    In ER, she was hypotensive with systolic in the 60s.  Blood pressure improved with 3 L of IV fluids.  Sodium was low at 130.  Potassium was low at 3.2.  Creatinine was up at 3.6.  She will be admitted to the hospital service for further evaluation    ROS:  General : no fevers, chills  CVS: No chest pain, palpitations  Respiratory: No cough, dyspnea  GI: No N/V/D, abd pain  10 point review of systems is negative except for what is mentioned in HPI         Personal History     Past Medical History:   Diagnosis Date   • Arthritis    • Chronic kidney disease    • Headache    • Heart disease    • Heart failure    • Hypertension    • Low back pain    • Osteoporosis    • Recent urinary tract infection    • Seizures               Past Surgical History:   Procedure Laterality Date   •  SECTION     • COLONOSCOPY         Family History: family history includes Arthritis in her mother; Asthma in her father; Diabetes in her mother; Heart disease in her father and mother; Hypertension in her father and mother; Kidney disease in her father and sister.     Social History:  reports that she has been smoking cigarettes. She has been smoking an average of 1.5 packs per day. She has never used smokeless tobacco. She reports current alcohol use. She reports that she does not use drugs.  Social History     Social History Narrative   • Not on file       Medications:  Available home medication information reviewed.  Medications Prior to Admission   Medication Sig Dispense Refill Last Dose   • albuterol sulfate  (90 Base) MCG/ACT inhaler Inhale 2 puffs Every 4 (Four) Hours As Needed for Wheezing.   Past Month   • amLODIPine (NORVASC) 10 MG tablet Take 1 tablet by mouth Daily.   2023   • aspirin 81 MG EC tablet Take 1 tablet by mouth Daily. OTC   2023   • atorvastatin (LIPITOR) 80 MG tablet Take 1 tablet by mouth Every Night. 90 tablet 0 2023   • busPIRone (BUSPAR) 5 MG tablet Take 1 tablet by mouth 2 (Two) Times a Day.   2023   • clopidogrel (PLAVIX) 75 MG tablet Take 1 tablet by mouth Daily.   2023   • escitalopram (LEXAPRO) 20 MG tablet Take 1 tablet by mouth Daily.   2023   • folic acid (FOLVITE) 1 MG tablet Take 1 tablet by mouth Daily.   2023   • hydrALAZINE (APRESOLINE) 25 MG tablet Take 1 tablet by mouth Daily As Needed.   Past Week   • icosapent ethyl (VASCEPA) 1 g capsule capsule Take 2 g by mouth 2 (Two) Times a Day With Meals.   2023   • levETIRAcetam (KEPPRA) 500 MG tablet Take 1 tablet by mouth 2 (Two) Times a Day.   2023   • losartan (COZAAR) 25 MG tablet Take 1 tablet by mouth Daily.   2023   • metoprolol succinate XL (TOPROL-XL) 50 MG 24 hr tablet Take 1 tablet  by mouth Daily.   5/20/2023   • mirtazapine (REMERON) 7.5 MG half tablet Take 2 half tablet by mouth Every Night.   5/20/2023   • spironolactone (ALDACTONE) 25 MG tablet Take 1 tablet by mouth Daily.   5/20/2023   • traZODone (DESYREL) 50 MG tablet Take 1 tablet by mouth Every Night.   5/20/2023   • vitamin B-12 (CYANOCOBALAMIN) 1000 MCG tablet Take 1 tablet by mouth Daily. OTC   5/20/2023       Allergies   Allergen Reactions   • Prunus Persica Hives       Objective   Objective     Vital Signs:   Temp:  [96.8 °F (36 °C)-97.4 °F (36.3 °C)] 96.8 °F (36 °C)  Heart Rate:  [51-72] 64  Resp:  [18] 18  BP: ()/(48-93) 120/61       Physical Exam   General: Comfortable, not in distress, conversant and cooperative  Head: Atraumatic and normocephalic  Eyes: No Icterus. No pallor  Ears:  Ears appear intact with no abnormalities noted  Throat: No oral lesions, no thrush  Neck: Supple, trachea midline  Lungs: Clear to auscultation bilaterally, equal air entry, no wheezing or crackles  Heart:  Normal S1 and S2, no murmur, no gallop, No JVD, no lower extremity swelling  Abdomen:  Soft, no tenderness, no organomegaly, normal bowel sounds, no organomegaly  Extremities: pulses equal bilaterally  Skin: No bleeding, bruising or rash, normal skin turgor and elasticity  Neurologic: Cranial nerves appear intact with no evidence of facial asymmetry, normal motor and sensory functions in all 4 extremities  Psych: Alert and oriented x 3, normal mood    Result Review:  I have personally reviewed the results from the time of this admission to 5/21/2023 12:52 EDT and agree with these findings:  [x]  Laboratory list / accordion  [x]  Microbiology  [x]  Radiology  [x]  EKG/Telemetry   []  Cardiology/Vascular   []  Pathology  []  Old records      LAB RESULTS:      Lab 05/21/23  0508 05/21/23  0205 05/21/23  0157 05/17/23 2014   WBC  --   --  9.92 10.99*   HEMOGLOBIN  --   --  12.3 12.5   HEMOGLOBIN, POC  --  12.6  --   --    HEMATOCRIT  --    --  38.5 38.5   HEMATOCRIT POC  --  37*  --   --    PLATELETS  --   --  417 441   NEUTROS ABS  --   --  7.11* 6.98   IMMATURE GRANS (ABS)  --   --  0.04 0.03   LYMPHS ABS  --   --  1.58 2.68   MONOS ABS  --   --  1.08* 0.97*   EOS ABS  --   --  0.04 0.24   MCV  --   --  99.7* 98.2*   CRP  --   --  0.47  --    PROCALCITONIN  --   --  0.32* 0.07   LACTATE 0.8  --  2.4* 1.2         Lab 05/21/23  0205 05/21/23 0157 05/17/23 2014   SODIUM  --  130* 138   POTASSIUM  --  3.3* 3.5   CHLORIDE  --  90* 98   CO2  --  26.0 26.0   ANION GAP  --  14.0 14.0   BUN  --  43* 41*   CREATININE 3.60* 3.32* 1.99*   EGFR 13.6* 14.9* 27.6*   GLUCOSE  --  104* 94   CALCIUM  --  8.1* 9.1   MAGNESIUM  --  1.8  --    PHOSPHORUS  --  5.6*  --    TSH  --  3.410  --          Lab 05/21/23 0157 05/17/23 2014   TOTAL PROTEIN 5.3* 6.4   ALBUMIN 3.2* 3.9   GLOBULIN 2.1 2.5   ALT (SGPT) 7 9   AST (SGOT) 11 15   BILIRUBIN 0.2 <0.2   ALK PHOS 72 95   LIPASE 39  --          Lab 05/21/23  0157   PROBNP 4,393.0*   HSTROP T 36*                 Lab 05/21/23  0328   PH, ARTERIAL 7.381   PCO2, ARTERIAL 40.6   PO2 ART 72.4*   FIO2 21   HCO3 ART 24.0   BASE EXCESS ART -1.0*   CARBOXYHEMOGLOBIN 3.0*     UA        3/16/2023    21:41 5/17/2023    20:14 5/21/2023    03:41   Urinalysis   Squamous Epithelial Cells, UA 7-12   7-12   31-50     Specific Gravity, UA 1.025   1.014   1.014     Ketones, UA Negative   Negative   Negative     Blood, UA Moderate (2+)   Negative   Negative     Leukocytes, UA Negative   Negative   Negative     Nitrite, UA Negative   Negative   Negative     RBC, UA 0-2   0-2   0-2     WBC, UA 3-5   0-2   6-12     Bacteria, UA Trace   None Seen   None Seen         Microbiology Results (last 10 days)     Procedure Component Value - Date/Time    COVID PRE-OP / PRE-PROCEDURE SCREENING ORDER (NO ISOLATION) - Swab, Nasopharynx [[id number]]  (Normal) Collected: 05/21/23 0247    Lab Status: Final result Specimen: Swab from Nasopharynx Updated: 05/21/23  0414    Narrative:      The following orders were created for panel order COVID PRE-OP / PRE-PROCEDURE SCREENING ORDER (NO ISOLATION) - Swab, Nasopharynx.  Procedure                               Abnormality         Status                     ---------                               -----------         ------                     Respiratory Panel PCR w/...[[id number]]  Normal              Final result                 Please view results for these tests on the individual orders.    Respiratory Panel PCR w/COVID-19(SARS-CoV-2) DERRELL/CINDY/DIAMANTE/PAD/COR/MAD/WALE In-House, NP Swab in UTM/VTM, 3-4 HR TAT - Swab, Nasopharynx [[id number]]  (Normal) Collected: 05/21/23 0247    Lab Status: Final result Specimen: Swab from Nasopharynx Updated: 05/21/23 0414     ADENOVIRUS, PCR Not Detected     Coronavirus 229E Not Detected     Coronavirus HKU1 Not Detected     Coronavirus NL63 Not Detected     Coronavirus OC43 Not Detected     COVID19 Not Detected     Human Metapneumovirus Not Detected     Human Rhinovirus/Enterovirus Not Detected     Influenza A PCR Not Detected     Influenza B PCR Not Detected     Parainfluenza Virus 1 Not Detected     Parainfluenza Virus 2 Not Detected     Parainfluenza Virus 3 Not Detected     Parainfluenza Virus 4 Not Detected     RSV, PCR Not Detected     Bordetella pertussis pcr Not Detected     Bordetella parapertussis PCR Not Detected     Chlamydophila pneumoniae PCR Not Detected     Mycoplasma pneumo by PCR Not Detected    Narrative:      In the setting of a positive respiratory panel with a viral infection PLUS a negative procalcitonin without other underlying concern for bacterial infection, consider observing off antibiotics or discontinuation of antibiotics and continue supportive care. If the respiratory panel is positive for atypical bacterial infection (Bordetella pertussis, Chlamydophila pneumoniae, or Mycoplasma pneumoniae), consider antibiotic de-escalation to target atypical bacterial infection.           CT Abdomen Pelvis Without Contrast    Result Date: 5/21/2023  EXAMINATION: CT ABDOMEN PELVIS WO CONTRAST DATE: 5/21/2023 4:32 AM INDICATION:  Sepsis, hypotension, acute renal failure ; COMPARISON: None. PROCEDURE:   CT of the abdomen and pelvis was performed with multiplanar reformatted images without intravenous contrast enhancement.  The exam is limited because some types of pathology may not be adequately demonstrated due to lack of contrast enhancement.  CT dose lowering techniques were used, to include: automated exposure control, adjustment for patient size, and or use of iterative reconstruction. FINDINGS: LOWER CHEST :  Unremarkable. ABDOMEN: Liver and Biliary system: No suspicious focal liver lesion on noncontrast exam. No biliary ductal dilation. Gallbladder:  Normal. Spleen:  Normal. Pancreas:  Normal. Adrenal glands:  Normal. Kidneys and ureters:  Moderate cortical atrophy right kidney. Probable small exophytic cyst lower pole right kidney and interpolar left kidney. No radiopaque urinary tract calculus. No hydronephrosis. Aorta/IVC:  Moderate atherosclerotic calcifications.  No abdominal aortic aneurysm.  IVC not dilated. Lymph nodes:  No visible abdominal lymphadenopathy. Gastrointestinal tract:  Stomach normal. Small bowel not dilated or thick-walled. Appendix normal. No colonic wall thickening or dilation. Peritoneum/Mesentery:  No ascites.  No pneumoperitoneum. Abdominal wall:  Postsurgical changes anterior abdominal wall. PELVIS: Urinary bladder:  Unremarkable. Reproductive organs:  5.5 cm circumscribed mass with internal lobules of fat inseparable from the uterus and right adnexa. Lymph Nodes:  No pelvic lymphadenopathy.. BONES:  Degenerative changes in the spine..     Impression:  1.  5.5 cm fat-containing pelvic mass, uterine likely leiomyoma or ovarian dermoid. Recommend follow-up pelvic ultrasound in one year. 2.  Otherwise, no acute abnormality within limitations of unenhanced exam. 3.   Chronic findings as above. Electronically signed by:  Keiko Orozco M.D.  5/21/2023 3:11 AM Mountain Time    XR Chest 1 View    Result Date: 5/21/2023  PROCEDURE:   XR CHEST 1 VW HISTORY:   Weakness COMPARISONS: 3/16/2023 FINDINGS: Lungs and pleura are clear. Smooth cardiomediastinal contours. Regional bones and soft tissues show no acute abnormality.     Impression: No acute cardiopulmonary abnormality. Electronically signed by:  Delfino Camacho D.O.  5/21/2023 12:54 AM Mountain Time      Results for orders placed during the hospital encounter of 03/16/23    Adult Transthoracic Echo Complete W/ Cont if Necessary Per Protocol    Interpretation Summary  •  Left ventricular systolic function is normal. Calculated left ventricular EF = 60.3%  •  Left ventricular diastolic function was normal.  •  Left atrial volume is mildly increased.  •  Estimated right ventricular systolic pressure from tricuspid regurgitation is normal (<35 mmHg).      Assessment & Plan   Assessment & Plan     Active Hospital Problems    Diagnosis  POA   • **Hypotension [I95.9]  Yes     Summary:  Gabriela Waller is a 62 y.o. female h/o essential hypertension, CKD stage III, old stroke, and seizure disorder with recent hospitalization in March 2023 for hypovolemic shock that was felt to be secondary to severe diarrhea and polypharmacy at that time, improved and was discharged home and presented to the hospital today with weakness and hypotension    Assessment and plan:   Hypertension, unspecified  Concern for polypharmacy/unintentional overdose on home meds  Volume depletion and dehydration  BOBY on CKD stage II  · Patient presented to the hospital with hypotension, weakness and frequent falls  · BP in the emergency department is in the 60s systolic and she was inappropriately bradycardic as well with heart rate in the 50s  · Had similar presentation in March of this year, severe enough for her to be requiring ICU admission and pressor  support  · Even though the patient lives with her daughter and son-in-law, she stays alone in the basement of the house and manage her medications on her own.  I am concerned about polypharmacy/unintentional overdose on her home medications particularly amlodipine, losartan, hydralazine and metoprolol which might have precipitated her hypotension and eventually BOBY  · She does not look severely volume depleted but reported diarrhea 4 days ago.  Per the patient and her son-in-law, she drinks plenty of fluids at home.  BP improved with 3 L of IV fluid with crystalloid.  We will continue at 125 mL per hour for next 24 hours  · I spoke to her son-in-law and he will start managing her home medications.  I encouraged him to buy a pill organizer which will minimize medication error/duplication  · For now, will continue IV fluids and continue to hold her antihypertensive medications.  Further adjustment of her antihypertensive medications upon discharge depends on her blood pressure control over the next 24 to 48 hours while hospitalized    Old stroke  Dyslipidemia  · Continue aspirin and Plavix  · Continue statins    Seizure disorder  · Continue Keppra 500 mg twice daily    Peripheral neuropathy  · On gabapentin 400 mg 3 times daily  · Per the son-in-law, she does not have any refills at home.  I think it is appropriate to send her home with 100 to 200 mg twice daily rather than 400 mg 3 times daily particularly with her kidney functions and to make it easy in regards to medication administration/pill organizer since all other medications are scheduled twice daily      DVT prophylaxis: Heparin      CODE STATUS: Full code  There are no questions and answers to display.       Expected Discharge   Expected Discharge Date: 5/23/2023; Expected Discharge Time:      Zaki Keller MD  05/21/23

## 2023-05-22 PROBLEM — I95.9 HYPOTENSION, UNSPECIFIED HYPOTENSION TYPE: Status: ACTIVE | Noted: 2023-05-22

## 2023-05-22 LAB
ACTH PLAS-MCNC: <1.5 PG/ML (ref 7.2–63.3)
ALBUMIN SERPL-MCNC: 3.3 G/DL (ref 3.5–5.2)
ALBUMIN/GLOB SERPL: 1.9 G/DL
ALP SERPL-CCNC: 60 U/L (ref 39–117)
ALT SERPL W P-5'-P-CCNC: 7 U/L (ref 1–33)
ANION GAP SERPL CALCULATED.3IONS-SCNC: 11 MMOL/L (ref 5–15)
AST SERPL-CCNC: 11 U/L (ref 1–32)
BACTERIA BLD CULT: ABNORMAL
BASOPHILS # BLD AUTO: 0.1 10*3/MM3 (ref 0–0.2)
BASOPHILS NFR BLD AUTO: 1.1 % (ref 0–1.5)
BILIRUB SERPL-MCNC: 0.2 MG/DL (ref 0–1.2)
BUN SERPL-MCNC: 32 MG/DL (ref 8–23)
BUN/CREAT SERPL: 15.8 (ref 7–25)
CALCIUM SPEC-SCNC: 8.2 MG/DL (ref 8.6–10.5)
CHLORIDE SERPL-SCNC: 109 MMOL/L (ref 98–107)
CO2 SERPL-SCNC: 21 MMOL/L (ref 22–29)
CREAT SERPL-MCNC: 2.02 MG/DL (ref 0.57–1)
DEPRECATED RDW RBC AUTO: 49.3 FL (ref 37–54)
EGFRCR SERPLBLD CKD-EPI 2021: 27.1 ML/MIN/1.73
EOSINOPHIL # BLD AUTO: 0.1 10*3/MM3 (ref 0–0.4)
EOSINOPHIL NFR BLD AUTO: 1.1 % (ref 0.3–6.2)
ERYTHROCYTE [DISTWIDTH] IN BLOOD BY AUTOMATED COUNT: 13.2 % (ref 12.3–15.4)
GLOBULIN UR ELPH-MCNC: 1.7 GM/DL
GLUCOSE BLDC GLUCOMTR-MCNC: 81 MG/DL (ref 70–130)
GLUCOSE SERPL-MCNC: 73 MG/DL (ref 65–99)
HCT VFR BLD AUTO: 38.2 % (ref 34–46.6)
HGB BLD-MCNC: 12 G/DL (ref 12–15.9)
IMM GRANULOCYTES # BLD AUTO: 0.03 10*3/MM3 (ref 0–0.05)
IMM GRANULOCYTES NFR BLD AUTO: 0.3 % (ref 0–0.5)
LYMPHOCYTES # BLD AUTO: 1.88 10*3/MM3 (ref 0.7–3.1)
LYMPHOCYTES NFR BLD AUTO: 20.3 % (ref 19.6–45.3)
MAGNESIUM SERPL-MCNC: 2 MG/DL (ref 1.6–2.4)
MCH RBC QN AUTO: 31.7 PG (ref 26.6–33)
MCHC RBC AUTO-ENTMCNC: 31.4 G/DL (ref 31.5–35.7)
MCV RBC AUTO: 101.1 FL (ref 79–97)
MONOCYTES # BLD AUTO: 0.87 10*3/MM3 (ref 0.1–0.9)
MONOCYTES NFR BLD AUTO: 9.4 % (ref 5–12)
NEUTROPHILS NFR BLD AUTO: 6.28 10*3/MM3 (ref 1.7–7)
NEUTROPHILS NFR BLD AUTO: 67.8 % (ref 42.7–76)
NRBC BLD AUTO-RTO: 0 /100 WBC (ref 0–0.2)
PLATELET # BLD AUTO: 405 10*3/MM3 (ref 140–450)
PMV BLD AUTO: 9.2 FL (ref 6–12)
POTASSIUM SERPL-SCNC: 3.8 MMOL/L (ref 3.5–5.2)
PROT SERPL-MCNC: 5 G/DL (ref 6–8.5)
RBC # BLD AUTO: 3.78 10*6/MM3 (ref 3.77–5.28)
SODIUM SERPL-SCNC: 141 MMOL/L (ref 136–145)
WBC NRBC COR # BLD: 9.26 10*3/MM3 (ref 3.4–10.8)

## 2023-05-22 PROCEDURE — G0378 HOSPITAL OBSERVATION PER HR: HCPCS

## 2023-05-22 PROCEDURE — 99232 SBSQ HOSP IP/OBS MODERATE 35: CPT | Performed by: INTERNAL MEDICINE

## 2023-05-22 PROCEDURE — 25010000002 HEPARIN (PORCINE) PER 1000 UNITS: Performed by: INTERNAL MEDICINE

## 2023-05-22 PROCEDURE — 82948 REAGENT STRIP/BLOOD GLUCOSE: CPT

## 2023-05-22 PROCEDURE — 96375 TX/PRO/DX INJ NEW DRUG ADDON: CPT

## 2023-05-22 PROCEDURE — 96372 THER/PROPH/DIAG INJ SC/IM: CPT

## 2023-05-22 PROCEDURE — 97161 PT EVAL LOW COMPLEX 20 MIN: CPT

## 2023-05-22 PROCEDURE — 80053 COMPREHEN METABOLIC PANEL: CPT | Performed by: INTERNAL MEDICINE

## 2023-05-22 PROCEDURE — 83735 ASSAY OF MAGNESIUM: CPT | Performed by: INTERNAL MEDICINE

## 2023-05-22 PROCEDURE — 96361 HYDRATE IV INFUSION ADD-ON: CPT

## 2023-05-22 PROCEDURE — 85025 COMPLETE CBC W/AUTO DIFF WBC: CPT | Performed by: INTERNAL MEDICINE

## 2023-05-22 PROCEDURE — 25010000002 HYDRALAZINE PER 20 MG: Performed by: INTERNAL MEDICINE

## 2023-05-22 PROCEDURE — 97165 OT EVAL LOW COMPLEX 30 MIN: CPT

## 2023-05-22 RX ORDER — HYDROCODONE BITARTRATE AND ACETAMINOPHEN 7.5; 325 MG/1; MG/1
1 TABLET ORAL 2 TIMES DAILY PRN
Status: DISCONTINUED | OUTPATIENT
Start: 2023-05-22 | End: 2023-05-24 | Stop reason: HOSPADM

## 2023-05-22 RX ORDER — GABAPENTIN 100 MG/1
100 CAPSULE ORAL DAILY
Status: DISCONTINUED | OUTPATIENT
Start: 2023-05-22 | End: 2023-05-23

## 2023-05-22 RX ORDER — METOPROLOL SUCCINATE 25 MG/1
25 TABLET, EXTENDED RELEASE ORAL DAILY
Status: DISCONTINUED | OUTPATIENT
Start: 2023-05-22 | End: 2023-05-24 | Stop reason: HOSPADM

## 2023-05-22 RX ORDER — BUSPIRONE HYDROCHLORIDE 10 MG/1
5 TABLET ORAL 2 TIMES DAILY
Status: DISCONTINUED | OUTPATIENT
Start: 2023-05-22 | End: 2023-05-24 | Stop reason: HOSPADM

## 2023-05-22 RX ORDER — SIMETHICONE 80 MG
80 TABLET,CHEWABLE ORAL 4 TIMES DAILY PRN
Status: DISCONTINUED | OUTPATIENT
Start: 2023-05-22 | End: 2023-05-24 | Stop reason: HOSPADM

## 2023-05-22 RX ORDER — SODIUM CHLORIDE 9 MG/ML
100 INJECTION, SOLUTION INTRAVENOUS CONTINUOUS
Status: DISCONTINUED | OUTPATIENT
Start: 2023-05-22 | End: 2023-05-23

## 2023-05-22 RX ORDER — AMLODIPINE BESYLATE 10 MG/1
10 TABLET ORAL DAILY
Status: DISCONTINUED | OUTPATIENT
Start: 2023-05-22 | End: 2023-05-24 | Stop reason: HOSPADM

## 2023-05-22 RX ORDER — HYDRALAZINE HYDROCHLORIDE 20 MG/ML
10 INJECTION INTRAMUSCULAR; INTRAVENOUS EVERY 8 HOURS PRN
Status: DISCONTINUED | OUTPATIENT
Start: 2023-05-22 | End: 2023-05-24 | Stop reason: HOSPADM

## 2023-05-22 RX ADMIN — LEVETIRACETAM 500 MG: 500 TABLET, FILM COATED ORAL at 08:32

## 2023-05-22 RX ADMIN — FOLIC ACID 1 MG: 1 TABLET ORAL at 08:32

## 2023-05-22 RX ADMIN — MIRTAZAPINE 15 MG: 15 TABLET, FILM COATED ORAL at 20:57

## 2023-05-22 RX ADMIN — SENNOSIDES AND DOCUSATE SODIUM 2 TABLET: 50; 8.6 TABLET ORAL at 08:31

## 2023-05-22 RX ADMIN — SODIUM CHLORIDE 100 ML/HR: 9 INJECTION, SOLUTION INTRAVENOUS at 20:58

## 2023-05-22 RX ADMIN — Medication 10 ML: at 20:58

## 2023-05-22 RX ADMIN — BUSPIRONE HYDROCHLORIDE 5 MG: 10 TABLET ORAL at 11:10

## 2023-05-22 RX ADMIN — HEPARIN SODIUM 5000 UNITS: 5000 INJECTION INTRAVENOUS; SUBCUTANEOUS at 05:26

## 2023-05-22 RX ADMIN — ESCITALOPRAM OXALATE 20 MG: 20 TABLET ORAL at 08:32

## 2023-05-22 RX ADMIN — GABAPENTIN 100 MG: 100 CAPSULE ORAL at 11:10

## 2023-05-22 RX ADMIN — SODIUM CHLORIDE 125 ML/HR: 9 INJECTION, SOLUTION INTRAVENOUS at 05:26

## 2023-05-22 RX ADMIN — ACETAMINOPHEN 650 MG: 325 TABLET ORAL at 09:16

## 2023-05-22 RX ADMIN — Medication 10 ML: at 08:32

## 2023-05-22 RX ADMIN — HEPARIN SODIUM 5000 UNITS: 5000 INJECTION INTRAVENOUS; SUBCUTANEOUS at 14:20

## 2023-05-22 RX ADMIN — AMLODIPINE BESYLATE 10 MG: 10 TABLET ORAL at 11:10

## 2023-05-22 RX ADMIN — Medication 1 PATCH: at 08:32

## 2023-05-22 RX ADMIN — CLOPIDOGREL BISULFATE 75 MG: 75 TABLET ORAL at 08:32

## 2023-05-22 RX ADMIN — LEVETIRACETAM 500 MG: 500 TABLET, FILM COATED ORAL at 20:57

## 2023-05-22 RX ADMIN — Medication 81 MG: at 08:32

## 2023-05-22 RX ADMIN — BUSPIRONE HYDROCHLORIDE 5 MG: 10 TABLET ORAL at 20:58

## 2023-05-22 RX ADMIN — ATORVASTATIN CALCIUM 80 MG: 40 TABLET, FILM COATED ORAL at 20:57

## 2023-05-22 RX ADMIN — HEPARIN SODIUM 5000 UNITS: 5000 INJECTION INTRAVENOUS; SUBCUTANEOUS at 21:01

## 2023-05-22 RX ADMIN — HYDRALAZINE HYDROCHLORIDE 10 MG: 20 INJECTION INTRAMUSCULAR; INTRAVENOUS at 14:20

## 2023-05-22 RX ADMIN — METOPROLOL SUCCINATE 25 MG: 25 TABLET, EXTENDED RELEASE ORAL at 11:10

## 2023-05-22 RX ADMIN — TRAZODONE HYDROCHLORIDE 50 MG: 50 TABLET ORAL at 20:57

## 2023-05-22 RX ADMIN — SIMETHICONE 80 MG: 80 TABLET, CHEWABLE ORAL at 11:10

## 2023-05-22 NOTE — THERAPY EVALUATION
Patient Name: Gabriela Waller  : 1960    MRN: [id number]                              Today's Date: 2023       Admit Date: 2023    Visit Dx:     ICD-10-CM ICD-9-CM   1. Hypotension, unspecified hypotension type  I95.9 458.9   2. Acute renal failure, unspecified acute renal failure type  N17.9 584.9   3. Dehydration  E86.0 276.51   4. Lactic acidemia  E87.20 276.2     Patient Active Problem List   Diagnosis   • Degenerative disc disease, lumbar   • Degenerative disc disease, cervical   • Cervical disc herniation   • Chronic midline low back pain with left-sided sciatica   • Heart disease   • Heart failure   • Essential hypertension   • Osteoporosis   • COPD (chronic obstructive pulmonary disease)   • Hyperlipidemia   • Sleep apnea   • Hypotension   • Hypotension, unspecified hypotension type     Past Medical History:   Diagnosis Date   • Arthritis    • Chronic kidney disease    • Headache    • Heart disease    • Heart failure    • Hypertension    • Low back pain    • Osteoporosis    • Recent urinary tract infection    • Seizures      Past Surgical History:   Procedure Laterality Date   •  SECTION     • COLONOSCOPY        General Information     Row Name 23 1332          Physical Therapy Time and Intention    Document Type evaluation  -NS     Mode of Treatment individual therapy;physical therapy  -NS     Row Name 23 1332          General Information    Patient Profile Reviewed yes  -NS     Prior Level of Function independent:;all household mobility;gait;transfer;bed mobility;ADL's  Pt states she uses a SPC PRN but only if she has to.  -NS     Existing Precautions/Restrictions fall;other (see comments)  fluctuating BP  -NS     Barriers to Rehab medically complex  -NS     Row Name 23 1332          Living Environment    People in Home child(sangita), adult  daughter and YAMILET. Pt's daughter is there during the day.  -NS     Row Name 23 1332          Home Main Entrance     Number of Stairs, Main Entrance none  -NS     Row Name 05/22/23 1332          Stairs Within Home, Primary    Number of Stairs, Within Home, Primary four  -NS     Stair Railings, Within Home, Primary railing on left side (ascending)  -NS     Row Name 05/22/23 1332          Cognition    Orientation Status (Cognition) oriented x 4  -NS     Row Name 05/22/23 1332          Safety Issues, Functional Mobility    Safety Issues Affecting Function (Mobility) insight into deficits/self-awareness  -NS     Impairments Affecting Function (Mobility) balance;endurance/activity tolerance;strength;pain  -NS           User Key  (r) = Recorded By, (t) = Taken By, (c) = Cosigned By    Initials Name Provider Type    Jaimie Jiang PT Physical Therapist               Mobility     Row Name 05/22/23 1332          Bed Mobility    Bed Mobility supine-sit;sit-supine  -NS     Supine-Sit [place] (Bed Mobility) modified independence  -NS     Sit-Supine [place] (Bed Mobility) modified independence  -NS     Assistive Device (Bed Mobility) bed rails;head of bed elevated  -NS     Comment, (Bed Mobility) completed without difficulty, no dizziness or light headedness reported sitting EOB  -NS     Row Name 05/22/23 1332          Sit-Stand Transfer    Sit-Stand [place] (Transfers) standby assist  -NS     Row Name 05/22/23 1332          Gait/Stairs (Locomotion)    [place] Level (Gait) contact guard  -NS     Distance in Feet (Gait) 15  -NS     Deviations/Abnormal Patterns (Gait) ayesha decreased;gait speed decreased;stride length decreased;base of support, wide  -NS     Bilateral Gait Deviations heel strike decreased  -NS     Comment, (Gait/Stairs) Patient ambulated at slow pace, demonstrating wide ANTOINETTE and mild unsteadiness but no LOB. Deferred further ambulation due to elevated BP.  -NS           User Key  (r) = Recorded By, (t) = Taken By, (c) = Cosigned By    Initials Name Provider Type    Jaimie Jiang, TONY Physical Therapist                Obj/Interventions     Row Name 05/22/23 1332          Range of Motion Comprehensive    General Range of Motion bilateral lower extremity ROM WFL  -NS     Row Name 05/22/23 1332          Strength Comprehensive (MMT)    General Manual Muscle Testing (MMT) Assessment lower extremity strength deficits identified  -NS     Comment, General Manual Muscle Testing (MMT) Assessment B ankle DF: 4+/5, B knee ext: 4+/5, L hip flexion: 3+/5 and painful, R hip flexion: 4-/5  -NS     Row Name 05/22/23 1332          Balance    Balance Assessment sitting static balance;sitting dynamic balance;standing static balance;standing dynamic balance  -NS     Static Sitting Balance independent  -NS     Dynamic Sitting Balance independent  -NS     Position, Sitting Balance unsupported;sitting edge of bed  -NS     Static Standing Balance standby assist  -NS     Dynamic Standing Balance contact guard  -NS     Position/Device Used, Standing Balance unsupported  -NS     Row Name 05/22/23 1332          Sensory Assessment (Somatosensory)    Sensory Assessment (Somatosensory) LE sensation intact  -NS           User Key  (r) = Recorded By, (t) = Taken By, (c) = Cosigned By    Initials Name Provider Type    Jaimie Jiang, PT Physical Therapist               Goals/Plan     Row Name 05/22/23 1332          Bed Mobility Goal 1 (PT)    Activity/Assistive Device (Bed Mobility Goal 1, PT) sit to supine/supine to sit  -NS     [place] Level/Cues Needed (Bed Mobility Goal 1, PT) independent  -NS     Time Frame (Bed Mobility Goal 1, PT) long term goal (LTG);2 weeks  -NS     Row Name 05/22/23 1332          Transfer Goal 1 (PT)    Activity/Assistive Device (Transfer Goal 1, PT) sit-to-stand/stand-to-sit;bed-to-chair/chair-to-bed  -NS     [place] Level/Cues Needed (Transfer Goal 1, PT) independent  -NS     Time Frame (Transfer Goal 1, PT) long term goal (LTG);2 weeks  -NS     Row Name 05/22/23 1332          Gait Training Goal 1 (PT)     Activity/Assistive Device (Gait Training Goal 1, PT) gait (walking locomotion)  -NS     [place] Level (Gait Training Goal 1, PT) independent  -NS     Distance (Gait Training Goal 1, PT) 200  -NS     Time Frame (Gait Training Goal 1, PT) long term goal (LTG);2 weeks  -NS     Row Name 05/22/23 1332          Stairs Goal 1 (PT)    Activity/Assistive Device (Stairs Goal 1, PT) ascending stairs;descending stairs  -NS     [place] Level/Cues Needed (Stairs Goal 1, PT) standby assist  -NS     Number of Stairs (Stairs Goal 1, PT) 4  -NS     Time Frame (Stairs Goal 1, PT) long term goal (LTG);2 weeks  -NS     Row Name 05/22/23 5702          Therapy Assessment/Plan (PT)    Planned Therapy Interventions (PT) balance training;bed mobility training;gait training;home exercise program;neuromuscular re-education;stair training;strengthening;patient/family education;transfer training  -NS           User Key  (r) = Recorded By, (t) = Taken By, (c) = Cosigned By    Initials Name Provider Type    Jaimie Jiang, PT Physical Therapist               Clinical Impression     Row Name 05/22/23 5815          Pain    Pretreatment Pain Rating 4/10  -NS     Posttreatment Pain Rating 4/10  -NS     Pain Location - Side/Orientation Left  -NS     Pain Location generalized  -NS     Pain Location - hip  -NS     Pre/Posttreatment Pain Comment Pt reports having chronic L hip pain due to arthritis.  -NS     Pain Intervention(s) Repositioned  -NS     Row Name 05/22/23 5282          Plan of Care Review    Plan of Care Reviewed With patient  -NS     Progress no change  -NS     Outcome Evaluation Patient presents with weakness, balance deficits, and pain affecting functional mobility. Evaluation limited by pt's hypertension, but she ambulated a short distance with CGA. Skilled IP PT warranted to address deficits and support return to independence. Anticipate patient will be appropriate to return home with assist and OP PT at discharge.  -NS      Row Name 05/22/23 1332          Therapy Assessment/Plan (PT)    Patient/Family Therapy Goals Statement (PT) return to PLOF  -NS     Rehab Potential (PT) good, to achieve stated therapy goals  -NS     Criteria for Skilled Interventions Met (PT) yes;meets criteria;skilled treatment is necessary  -NS     Therapy Frequency (PT) daily  -NS     Predicted Duration of Therapy Intervention (PT) 2 weeks  -NS     Row Name 05/22/23 1332          Vital Signs    Pre Systolic BP Rehab 164  -NS     Pre Treatment Diastolic BP 83  -NS     Intra Systolic BP Rehab 185  -NS     Intra Treatment Diastolic BP 97  -NS     Post Systolic BP Rehab 169  -NS     Post Treatment Diastolic    RN notified  -NS     Pretreatment Heart Rate (beats/min) 65  -NS     Posttreatment Heart Rate (beats/min) 67  -NS     Pre SpO2 (%) 99  -NS     O2 Delivery Pre Treatment room air  -NS     Post SpO2 (%) 99  -NS     O2 Delivery Post Treatment room air  -NS     Pre Patient Position Supine  -NS     Intra Patient Position Standing  -NS     Post Patient Position Supine  -NS     Row Name 05/22/23 1332          Positioning and Restraints    Pre-Treatment Position in bed  -NS     Post Treatment Position bed  -NS     In Bed notified nsg;supine;call light within reach;encouraged to call for assist;exit alarm on;side rails up x2  -NS           User Key  (r) = Recorded By, (t) = Taken By, (c) = Cosigned By    Initials Name Provider Type    Jaimie Jiang, PT Physical Therapist               Outcome Measures     Row Name 05/22/23 1482          How much help from another person do you currently need...    Turning from your back to your side while in flat bed without using bedrails? 4  -NS     Moving from lying on back to sitting on the side of a flat bed without bedrails? 4  -NS     Moving to and from a bed to a chair (including a wheelchair)? 3  -NS     Standing up from a chair using your arms (e.g., wheelchair, bedside chair)? 3  -NS     Climbing 3-5 steps with a  railing? 3  -NS     To walk in hospital room? 3  -NS     AM-PAC 6 Clicks Score (PT) 20  -NS     Highest level of mobility 6 --> Walked 10 steps or more  -NS     Row Name 05/22/23 1332 05/22/23 1056       Functional Assessment    Outcome Measure Options AM-PAC 6 Clicks Basic Mobility (PT)  -NS AM-PAC 6 Clicks Daily Activity (OT)  -RUPAL          User Key  (r) = Recorded By, (t) = Taken By, (c) = Cosigned By    Initials Name Provider Type    Regine Tierney, OT Occupational Therapist    Jaimie Jiang, PT Physical Therapist                             Physical Therapy Education     Title: PT OT SLP Therapies (In Progress)     Topic: Physical Therapy (In Progress)     Point: Mobility training (Done)     Learning Progress Summary           Patient Acceptance, E, VU,NR by NS at 5/22/2023 1413                   Point: Home exercise program (Not Started)     Learner Progress:  Not documented in this visit.          Point: Body mechanics (Done)     Learning Progress Summary           Patient Acceptance, E, VU,NR by NS at 5/22/2023 1413                   Point: Precautions (Done)     Learning Progress Summary           Patient Acceptance, E, VU,NR by NS at 5/22/2023 1413                               User Key     Initials Effective Dates Name Provider Type Discipline    NS 06/16/21 -  Jaimie Shabazz, TONY Physical Therapist PT              PT Recommendation and Plan  Planned Therapy Interventions (PT): balance training, bed mobility training, gait training, home exercise program, neuromuscular re-education, stair training, strengthening, patient/family education, transfer training  Plan of Care Reviewed With: patient  Progress: no change  Outcome Evaluation: Patient presents with weakness, balance deficits, and pain affecting functional mobility. Evaluation limited by pt's hypertension, but she ambulated a short distance with CGA. Skilled IP PT warranted to address deficits and support return to independence. Anticipate  patient will be appropriate to return home with assist and OP PT at discharge.     Time Calculation:    PT Charges     Row Name 05/22/23 1332             Time Calculation    Start Time 1332  -NS      PT Received On 05/22/23  -NS      PT Goal Re-Cert Due Date 06/01/23  -NS         Untimed Charges    PT Eval/Re-eval Minutes 46  -NS         Total Minutes    Untimed Charges Total Minutes 46  -NS       Total Minutes 46  -NS            User Key  (r) = Recorded By, (t) = Taken By, (c) = Cosigned By    Initials Name Provider Type    Jaimie Jiang, PT Physical Therapist              Therapy Charges for Today     Code Description Service Date Service Provider Modifiers Qty    [id number] HC PT EVAL LOW COMPLEXITY 4 5/22/2023 Jaimie Shabazz, PT GP 1          PT G-Codes  Outcome Measure Options: AM-PAC 6 Clicks Basic Mobility (PT)  AM-PAC 6 Clicks Score (PT): 20  AM-PAC 6 Clicks Score (OT): 23  PT Discharge Summary  Anticipated Discharge Disposition (PT): home with assist, home with outpatient therapy services    Jaimie Shabazz PT  5/22/2023

## 2023-05-22 NOTE — PLAN OF CARE
Goal Outcome Evaluation:  Plan of Care Reviewed With: patient        Progress: no change  Outcome Evaluation: OT evaluation completed, but limited her activity due to high BP.  Pt. demonstrated good transfer and ADL task performance, but fatigued quickly with 6/10 tailbone and LE pain limiting standing time.  Pt. appeares with generalized weakness. Pt. appropriate for skilled OT services to address deficit areas and promote return to PLOF.

## 2023-05-22 NOTE — PLAN OF CARE
Problem: Adult Inpatient Plan of Care  Goal: Plan of Care Review  5/22/2023 1941 by Renita Chahal RN  Outcome: Ongoing, Progressing  Flowsheets (Taken 5/22/2023 1939)  Outcome Evaluation: patient on room air. complains of pain 6/10 on head and tailbone. given prn tylenol. complains of nausea. given mylicon. Blood pressure elevated. MD notified. Given prn hydralazine. Continue POC.  5/22/2023 1939 by Renita Chahal RN  Outcome: Ongoing, Progressing  Flowsheets (Taken 5/22/2023 1939)  Progress: improving  Plan of Care Reviewed With: patient  Outcome Evaluation: patient on room air. complains of pain 6/10 on head and tailbone. given prn tylenol. complains of nausea. given mylicon. Blood pressure elevated. MD notified. Given prn hydralazine. Continue POC.   Goal Outcome Evaluation:  Plan of Care Reviewed With: patient        Progress: improving  Outcome Evaluation: patient on room air. complains of pain 6/10 on head and tailbone. given prn tylenol. complains of nausea. given mylicon. Blood pressure elevated. MD notified. Given prn hydralazine. Continue POC.

## 2023-05-22 NOTE — PROGRESS NOTES
[facility] Medicine Services  PROGRESS NOTE    Patient Name: Gabriela Waller  : 1960  MRN: [id number]    Date of Admission: 2023  Primary Care Physician: System, Provider Not In    Subjective   Subjective     CC: hypotension, BOBY      HPI:  Feels fine today. No fever. Denies cough. No nausea    ROS:  Gen: no fever  Pulm: no cough  GI: no abdominal pain  CV: no chest pain      Objective   Objective     Vital Signs:   Temp:  [96.8 °F (36 °C)-98.4 °F (36.9 °C)] 96.8 °F (36 °C)  Heart Rate:  [64-80] 79  Resp:  [18] 18  BP: (120-209)/() 209/108     Physical Exam:  Constitutional - no acute distress, somewhat frail, up in chair  HEENT-NCAT, mucous membranes moist  CV-RRR  Resp-CTAB  Abd-soft, nontender, nondistended, normoactive bowel sounds  Ext-No lower extremity cyanosis, clubbing or edema bilaterally  Neuro-alert , speech clear, moves all extremities   Psych-normal affect   Skin- No rash on exposed UE or LE bilaterally      Results Reviewed:  LAB RESULTS:      Lab 23  0451 23  0508 23  0205 23  0157 23   WBC 9.26  --   --  9.92 10.99*   HEMOGLOBIN 12.0  --   --  12.3 12.5   HEMOGLOBIN, POC  --   --  12.6  --   --    HEMATOCRIT 38.2  --   --  38.5 38.5   HEMATOCRIT POC  --   --  37*  --   --    PLATELETS 405  --   --  417 441   NEUTROS ABS 6.28  --   --  7.11* 6.98   IMMATURE GRANS (ABS) 0.03  --   --  0.04 0.03   LYMPHS ABS 1.88  --   --  1.58 2.68   MONOS ABS 0.87  --   --  1.08* 0.97*   EOS ABS 0.10  --   --  0.04 0.24   .1*  --   --  99.7* 98.2*   CRP  --   --   --  0.47  --    PROCALCITONIN  --   --   --  0.32* 0.07   LACTATE  --  0.8  --  2.4* 1.2         Lab 23  0451 23  1143 23  0205 23  0157 23   SODIUM 141  --   --  130* 138   POTASSIUM 3.8 3.9  --  3.3* 3.5   CHLORIDE 109*  --   --  90* 98   CO2 21.0*  --   --  26.0 26.0   ANION GAP 11.0  --   --  14.0 14.0   BUN 32*  --   --  43*  41*   CREATININE 2.02*  --  3.60* 3.32* 1.99*   EGFR 27.1*  --  13.6* 14.9* 27.6*   GLUCOSE 73  --   --  104* 94   CALCIUM 8.2*  --   --  8.1* 9.1   MAGNESIUM 2.0  --   --  1.8  --    PHOSPHORUS  --   --   --  5.6*  --    TSH  --   --   --  3.410  --          Lab 05/22/23  0451 05/21/23 0157 05/17/23 2014   TOTAL PROTEIN 5.0* 5.3* 6.4   ALBUMIN 3.3* 3.2* 3.9   GLOBULIN 1.7 2.1 2.5   ALT (SGPT) 7 7 9   AST (SGOT) 11 11 15   BILIRUBIN 0.2 0.2 <0.2   ALK PHOS 60 72 95   LIPASE  --  39  --          Lab 05/21/23 0157   PROBNP 4,393.0*   HSTROP T 36*             Lab 05/21/23 0157   VITAMIN B 12 403         Lab 05/21/23  0328   PH, ARTERIAL 7.381   PCO2, ARTERIAL 40.6   PO2 ART 72.4*   FIO2 21   HCO3 ART 24.0   BASE EXCESS ART -1.0*   CARBOXYHEMOGLOBIN 3.0*     Brief Urine Lab Results  (Last result in the past 365 days)      Color   Clarity   Blood   Leuk Est   Nitrite   Protein   CREAT   Urine HCG        05/21/23 0341 Yellow   Cloudy   Negative   Negative   Negative   >=300 mg/dL (3+)                 Microbiology Results Abnormal     Procedure Component Value - Date/Time    Blood Culture - Blood, Arm, Left [[id number]]  (Normal) Collected: 05/21/23 0232    Lab Status: Preliminary result Specimen: Blood from Arm, Left Updated: 05/22/23 0645     Blood Culture No growth at 24 hours    COVID PRE-OP / PRE-PROCEDURE SCREENING ORDER (NO ISOLATION) - Swab, Nasopharynx [[id number]]  (Normal) Collected: 05/21/23 0247    Lab Status: Final result Specimen: Swab from Nasopharynx Updated: 05/21/23 0414    Narrative:      The following orders were created for panel order COVID PRE-OP / PRE-PROCEDURE SCREENING ORDER (NO ISOLATION) - Swab, Nasopharynx.  Procedure                               Abnormality         Status                     ---------                               -----------         ------                     Respiratory Panel PCR w/...[[id number]]  Normal              Final result                 Please view results for  these tests on the individual orders.    Respiratory Panel PCR w/COVID-19(SARS-CoV-2) DERRELL/CINDY/DIAMANTE/PAD/COR/MAD/WALE In-House, NP Swab in UTM/VTM, 3-4 HR TAT - Swab, Nasopharynx [[id number]]  (Normal) Collected: 05/21/23 0247    Lab Status: Final result Specimen: Swab from Nasopharynx Updated: 05/21/23 0414     ADENOVIRUS, PCR Not Detected     Coronavirus 229E Not Detected     Coronavirus HKU1 Not Detected     Coronavirus NL63 Not Detected     Coronavirus OC43 Not Detected     COVID19 Not Detected     Human Metapneumovirus Not Detected     Human Rhinovirus/Enterovirus Not Detected     Influenza A PCR Not Detected     Influenza B PCR Not Detected     Parainfluenza Virus 1 Not Detected     Parainfluenza Virus 2 Not Detected     Parainfluenza Virus 3 Not Detected     Parainfluenza Virus 4 Not Detected     RSV, PCR Not Detected     Bordetella pertussis pcr Not Detected     Bordetella parapertussis PCR Not Detected     Chlamydophila pneumoniae PCR Not Detected     Mycoplasma pneumo by PCR Not Detected    Narrative:      In the setting of a positive respiratory panel with a viral infection PLUS a negative procalcitonin without other underlying concern for bacterial infection, consider observing off antibiotics or discontinuation of antibiotics and continue supportive care. If the respiratory panel is positive for atypical bacterial infection (Bordetella pertussis, Chlamydophila pneumoniae, or Mycoplasma pneumoniae), consider antibiotic de-escalation to target atypical bacterial infection.          CT Abdomen Pelvis Without Contrast    Result Date: 5/21/2023  EXAMINATION: CT ABDOMEN PELVIS WO CONTRAST DATE: 5/21/2023 4:32 AM INDICATION:  Sepsis, hypotension, acute renal failure ; COMPARISON: None. PROCEDURE:   CT of the abdomen and pelvis was performed with multiplanar reformatted images without intravenous contrast enhancement.  The exam is limited because some types of pathology may not be adequately demonstrated due to lack  of contrast enhancement.  CT dose lowering techniques were used, to include: automated exposure control, adjustment for patient size, and or use of iterative reconstruction. FINDINGS: LOWER CHEST :  Unremarkable. ABDOMEN: Liver and Biliary system: No suspicious focal liver lesion on noncontrast exam. No biliary ductal dilation. Gallbladder:  Normal. Spleen:  Normal. Pancreas:  Normal. Adrenal glands:  Normal. Kidneys and ureters:  Moderate cortical atrophy right kidney. Probable small exophytic cyst lower pole right kidney and interpolar left kidney. No radiopaque urinary tract calculus. No hydronephrosis. Aorta/IVC:  Moderate atherosclerotic calcifications.  No abdominal aortic aneurysm.  IVC not dilated. Lymph nodes:  No visible abdominal lymphadenopathy. Gastrointestinal tract:  Stomach normal. Small bowel not dilated or thick-walled. Appendix normal. No colonic wall thickening or dilation. Peritoneum/Mesentery:  No ascites.  No pneumoperitoneum. Abdominal wall:  Postsurgical changes anterior abdominal wall. PELVIS: Urinary bladder:  Unremarkable. Reproductive organs:  5.5 cm circumscribed mass with internal lobules of fat inseparable from the uterus and right adnexa. Lymph Nodes:  No pelvic lymphadenopathy.. BONES:  Degenerative changes in the spine..     Impression:  1.  5.5 cm fat-containing pelvic mass, uterine likely leiomyoma or ovarian dermoid. Recommend follow-up pelvic ultrasound in one year. 2.  Otherwise, no acute abnormality within limitations of unenhanced exam. 3.  Chronic findings as above. Electronically signed by:  Keiko Orozco M.D.  5/21/2023 3:11 AM Mountain Time    XR Chest 1 View    Result Date: 5/21/2023  PROCEDURE:   XR CHEST 1 VW HISTORY:   Weakness COMPARISONS: 3/16/2023 FINDINGS: Lungs and pleura are clear. Smooth cardiomediastinal contours. Regional bones and soft tissues show no acute abnormality.     Impression: No acute cardiopulmonary abnormality. Electronically signed by:  Delfino  BRITANY Camacho  5/21/2023 12:54 AM Mountain Time      Results for orders placed during the hospital encounter of 03/16/23    Adult Transthoracic Echo Complete W/ Cont if Necessary Per Protocol    Interpretation Summary  •  Left ventricular systolic function is normal. Calculated left ventricular EF = 60.3%  •  Left ventricular diastolic function was normal.  •  Left atrial volume is mildly increased.  •  Estimated right ventricular systolic pressure from tricuspid regurgitation is normal (<35 mmHg).      Current medications:  Scheduled Meds:amLODIPine, 10 mg, Oral, Daily  aspirin, 81 mg, Oral, Daily  atorvastatin, 80 mg, Oral, Nightly  busPIRone, 5 mg, Oral, BID  clopidogrel, 75 mg, Oral, Daily  escitalopram, 20 mg, Oral, Daily  folic acid, 1 mg, Oral, Daily  gabapentin, 100 mg, Oral, Daily  heparin (porcine), 5,000 Units, Subcutaneous, Q8H  levETIRAcetam, 500 mg, Oral, Q12H  metoprolol succinate XL, 25 mg, Oral, Daily  mirtazapine, 15 mg, Oral, Nightly  nicotine, 1 patch, Transdermal, Q24H  senna-docusate sodium, 2 tablet, Oral, BID  sodium chloride, 10 mL, Intravenous, Q12H  traZODone, 50 mg, Oral, Nightly      Continuous Infusions:sodium chloride, 125 mL/hr, Last Rate: 125 mL/hr (05/22/23 0526)      PRN Meds:.•  acetaminophen **OR** acetaminophen **OR** acetaminophen  •  albuterol  •  senna-docusate sodium **AND** polyethylene glycol **AND** bisacodyl **AND** bisacodyl  •  Calcium Replacement - Follow Nurse / BPA Driven Protocol  •  HYDROcodone-acetaminophen  •  Magnesium Low Dose Replacement - Follow Nurse / BPA Driven Protocol  •  Phosphorus Replacement - Follow Nurse / BPA Driven Protocol  •  Potassium Replacement - Follow Nurse / BPA Driven Protocol  •  sodium chloride  •  sodium chloride  •  sodium chloride    Assessment & Plan   Assessment & Plan     Active Hospital Problems    Diagnosis  POA   • **Hypotension [I95.9]  Yes   • Hypotension, unspecified hypotension type [I95.9]  Yes      Resolved  Hospital Problems   No resolved problems to display.        Brief Hospital Course to date:  Gabriela Waller is a 62 y.o. female with history of CKD III, CVA, seizure disorder and recent hospitalization for hypovolemic shock and BOBY felt due to volume depletion (diarrhea) and polypharmacy (multiple BP meds), presents with hypotension, weakness and BOBY      Hypotension  Volume depletion  BOBY on CKD III  - BP and creatinine improved with IV fluid hydration  - suspect accidental overuse of home antihypertensives -- family to start managing home medications  - bmp am    HTN  - resume norvac, holding ARB and aldactone    H/o CVA  Dyslipidemia  - DAPT, Statin    H/o seizures  - keppra    Peripheral neuropathy  - lowered dose of gabapentin in setting of BOBY    Tobacco abuse  - nicotine replacement,  cessation    Generalized weakness  - PT/OT    Uterine mass  - differential includes leiomyoma and ovarian dermoid  - discussed with patient, including the need for close followup and additional evaluation with pelvic ultrasound to rule out other etiologies  - consider referral to gynecology at discharge      Expected Discharge Location and Transportation: home  Expected Discharge   Expected Discharge Date: 5/23/2023; Expected Discharge Time:      DVT prophylaxis:  Medical DVT prophylaxis orders are present.          CODE STATUS:   There are no questions and answers to display.       Omero Puente MD  05/22/23       [Post Op: _________] : a [unfilled] post op visit

## 2023-05-22 NOTE — THERAPY EVALUATION
Patient Name: Gabriela Waller  : 1960    MRN: [id number]                              Today's Date: 2023       Admit Date: 2023    Visit Dx:     ICD-10-CM ICD-9-CM   1. Hypotension, unspecified hypotension type  I95.9 458.9   2. Acute renal failure, unspecified acute renal failure type  N17.9 584.9   3. Dehydration  E86.0 276.51   4. Lactic acidemia  E87.20 276.2     Patient Active Problem List   Diagnosis   • Degenerative disc disease, lumbar   • Degenerative disc disease, cervical   • Cervical disc herniation   • Chronic midline low back pain with left-sided sciatica   • Heart disease   • Heart failure   • Essential hypertension   • Osteoporosis   • COPD (chronic obstructive pulmonary disease)   • Hyperlipidemia   • Sleep apnea   • Hypotension   • Hypotension, unspecified hypotension type     Past Medical History:   Diagnosis Date   • Arthritis    • Chronic kidney disease    • Headache    • Heart disease    • Heart failure    • Hypertension    • Low back pain    • Osteoporosis    • Recent urinary tract infection    • Seizures      Past Surgical History:   Procedure Laterality Date   •  SECTION     • COLONOSCOPY        General Information     Row Name 23 1037          OT Time and Intention    Document Type evaluation  -RUPAL     Mode of Treatment individual therapy;occupational therapy  -RUPAL     Row Name 23 1037          General Information    Patient Profile Reviewed yes  -RUPAL     Existing Precautions/Restrictions fall;other (see comments)  elevated BP  -RUPAL     Barriers to Rehab none identified  -RUPAL     Row Name 23 1037          Occupational Profile    Environmental Supports and Barriers (Occupational Profile) tub shower without AD, toilet good height per pt, pt. in downstairs area and dtr, and YAMILET in upstairs home portion, but pt. does go up to kitchen area at time, which is 4 steps up  -RUPAL     Row Name 23 1037          Home Main Entrance    Number of Stairs,  Main Entrance --  see PT note for entrance steps  -     Row Name 05/22/23 1037          Stairs Within Home, Primary    Number of Stairs, Within Home, Primary four  -RUPAL     Stair Railings, Within Home, Primary railing on left side (ascending)  -     Row Name 05/22/23 1037          Cognition    Orientation Status (Cognition) oriented x 4;verbal cues/prompts needed for orientation  pt. did not know name of hospital  -     Row Name 05/22/23 1037          Safety Issues, Functional Mobility    Impairments Affecting Function (Mobility) strength;pain;endurance/activity tolerance  -           User Key  (r) = Recorded By, (t) = Taken By, (c) = Cosigned By    Initials Name Provider Type    RUPAL Regine Roper, OT Occupational Therapist                 Mobility/ADL's     Row Name 05/22/23 1039          Bed Mobility    Bed Mobility supine-sit  -     Supine-Sit [place] (Bed Mobility) modified independence  -     Assistive Device (Bed Mobility) bed rails;head of bed elevated  -     Row Name 05/22/23 1039          Transfers    Transfers sit-stand transfer;stand-sit transfer  -     Row Name 05/22/23 1039          Sit-Stand Transfer    Sit-Stand [place] (Transfers) standby assist  SBA for mvmt for evaluation purposes, no unsteadiness noted  -     Row Name 05/22/23 1039          Stand-Sit Transfer    Stand-Sit [place] (Transfers) standby assist  -     Row Name 05/22/23 1039          Functional Mobility    Functional Mobility- Ind. Level standby assist  -     Functional Mobility-Distance (Feet) 26  -     Row Name 05/22/23 1039          Activities of Daily Living    BADL Assessment/Intervention lower body dressing;grooming;toileting  -     Row Name 05/22/23 1039          Lower Body Dressing Assessment/Training    Position (Lower Body Dressing) supported sitting  -     Comment, (Lower Body Dressing) pt. crossed up LE's under her when sitting in recliner showing good ability to reach feet for LB  dressing  -RUPAL     Row Name 05/22/23 1039          Grooming Assessment/Training    [place] Level (Grooming) oral care regimen;wash face, hands;standby assist;hair care, combing/brushing;independent  -RUPAL     Position (Grooming) sink side;unsupported standing;supported sitting  -RUPAL     Comment, (Grooming) brushed hair in chair instead of sinkside due to elevated BP  -RUPAL     Row Name 05/22/23 1039          Toileting Assessment/Training    [place] Level (Toileting) adjust/manage clothing;perform perineal hygiene;supervision  -RUPAL     Position (Toileting) unsupported sitting;unsupported standing  -RUPAL           User Key  (r) = Recorded By, (t) = Taken By, (c) = Cosigned By    Initials Name Provider Type    Regine Tierney OT Occupational Therapist               Obj/Interventions     Row Name 05/22/23 1040          Sensory Assessment (Somatosensory)    Sensory Assessment (Somatosensory) UE sensation intact  -RUPAL     Row Name 05/22/23 1040          Vision Assessment/Intervention    Visual Impairment/Limitations corrective lenses for reading  -[facility] Name 05/22/23 1040          Range of Motion Comprehensive    General Range of Motion bilateral upper extremity ROM WNL  -[facility] Name 05/22/23 1040          Strength Comprehensive (MMT)    General Manual Muscle Testing (MMT) Assessment upper extremity strength deficits identified  -RUPAL     Comment, General Manual Muscle Testing (MMT) Assessment BUE grossly 3+ to 4/5  -[facility] Name 05/22/23 1040          Balance    Balance Assessment sitting static balance;sitting dynamic balance;standing static balance;standing dynamic balance  -RUPAL     Static Sitting Balance independent  -RUPAL     Dynamic Sitting Balance independent  -RUPAL     Position, Sitting Balance unsupported;sitting edge of bed  -RUPAL     Static Standing Balance supervision  -     Dynamic Standing Balance standby assist  -RUPAL     Balance Interventions sit to stand;occupation based/functional task  -RUPAL      Comment, Balance toileting, LBD  -RUPAL           User Key  (r) = Recorded By, (t) = Taken By, (c) = Cosigned By    Initials Name Provider Type    Regine Tierney OT Occupational Therapist               Goals/Plan     Row Name 05/22/23 1048          Strength Goal 1 (OT)    Strength Goal 1 (OT) Pt. will complete 10-15 reps of each BUE Strengthenging TE to support IADL independence.  -RUPAL     Time Frame (Strength Goal 1, OT) long term goal (LTG);10 days  -RUPAL     Progress/Outcome (Strength Goal 1, OT) new goal  -RUPAL     Row Name 05/22/23 1048          Problem Specific Goal 1 (OT)    Problem Specific Goal 1 (OT) Pt. will demonstrate good balance and activity tolerance to complete simple HM task with SBA, such as item retrieval high and low, straightening bed  or folding laundry.  -RUPAL     Time Frame (Problem Specific Goal 1, OT) long term goal (LTG);10 days  -URPAL     Strategies/Barriers (Problem Specific Goal 1, OT) energy conservation techniques as needed  -RUPAL     Progress/Outcome (Problem Specific Goal 1, OT) new goal  -RUPAL     Row Name 05/22/23 1041          Therapy Assessment/Plan (OT)    Planned Therapy Interventions (OT) activity tolerance training;functional balance retraining;occupation/activity based interventions;strengthening exercise;transfer/mobility retraining;ROM/therapeutic exercise;patient/caregiver education/training  -RUPAL           User Key  (r) = Recorded By, (t) = Taken By, (c) = Cosigned By    Initials Name Provider Type    Regine Tierney OT Occupational Therapist               Clinical Impression     Row Name 05/22/23 1042          Pain Assessment    Pretreatment Pain Rating 6/10  -RUPAL     Posttreatment Pain Rating 6/10  -RUPAL     Pain Location - Side/Orientation Bilateral  -RUPAL     Pain Location lower  -RUPAL     Pain Location - extremity  tail bone pain, per pt. it is chronic pain  -RUPAL     Pre/Posttreatment Pain Comment alerted nurse and to get pt. pain meds  -RUPAL     Pain Intervention(s) Repositioned   -     Row Name 05/22/23 1042          Plan of Care Review    Plan of Care Reviewed With patient  -RUPAL     Progress no change  -     Outcome Evaluation OT evaluation completed, but limited her activity due to high BP.  Pt. demonstrated good transfer and ADL task performance, but fatigued quickly with 6/10 tailbone and LE pain limiting standing time.  Pt. appeares with generalized weakness. Pt. appropriate for skilled OT services to address deficit areas and promote return to PLOF.  -     Row Name 05/22/23 1042          Therapy Assessment/Plan (OT)    Patient/Family Therapy Goal Statement (OT) return to PLOF ADLs  -     Rehab Potential (OT) good, to achieve stated therapy goals  -     Criteria for Skilled Therapeutic Interventions Met (OT) yes;meets criteria;skilled treatment is necessary  -     Therapy Frequency (OT) daily  -     Row Name 05/22/23 1042          Therapy Plan Review/Discharge Plan (OT)    Anticipated Discharge Disposition (OT) home with assist;home with outpatient therapy services  -     Row Name 05/22/23 1042          Vital Signs    Pre Systolic BP Rehab 194  nurse cleared, stated to restart BP meds  -RUPAL     Pre Treatment Diastolic   -RUPAL     Post Systolic BP Rehab 209  -RUPAL     Post Treatment Diastolic   -RUPAL     Pretreatment Heart Rate (beats/min) 79  -RUPAL     Posttreatment Heart Rate (beats/min) 83  -RUPAL     O2 Delivery Pre Treatment room air  -RUPAL     O2 Delivery Intra Treatment room air  -RUPAL     Post SpO2 (%) 98  -RUPAL     O2 Delivery Post Treatment room air  -RUPAL     Pre Patient Position Supine  -RUPAL     Intra Patient Position Standing  -RUPAL     Post Patient Position Sitting  -     Row Name 05/22/23 1042          Positioning and Restraints    Pre-Treatment Position in bed  -RUPAL     Post Treatment Position chair  -RUPAL     In Chair reclined;call light within reach;encouraged to call for assist;exit alarm on;waffle cushion;legs elevated  alerted nurse would be moved to recliner  -RUPAL            User Key  (r) = Recorded By, (t) = Taken By, (c) = Cosigned By    Initials Name Provider Type    Regine Tierney OT Occupational Therapist               Outcome Measures     Row Name 05/22/23 1056          How much help from another is currently needed...    Putting on and taking off regular lower body clothing? 4  -RUPAL     Bathing (including washing, rinsing, and drying) 3  -RUPAL     Toileting (which includes using toilet bed pan or urinal) 4  -RUPAL     Putting on and taking off regular upper body clothing 4  -RUPAL     Taking care of personal grooming (such as brushing teeth) 4  -RUPAL     Eating meals 4  -RUPAL     AM-PAC 6 Clicks Score (OT) 23  -RUPAL     Row Name 05/22/23 1056          Functional Assessment    Outcome Measure Options AM-PAC 6 Clicks Daily Activity (OT)  -RUPAL           User Key  (r) = Recorded By, (t) = Taken By, (c) = Cosigned By    Initials Name Provider Type    Regine Tierney, ÁNGEL Occupational Therapist                Occupational Therapy Education     Title: PT OT SLP Therapies (In Progress)     Topic: Occupational Therapy (In Progress)     Point: ADL training (Done)     Description:   Instruct learner(s) on proper safety adaptation and remediation techniques during self care or transfers.   Instruct in proper use of assistive devices.              Learning Progress Summary           Patient Acceptance, E, VU by RUPAL at 5/22/2023 1051    Comment: reason for consult, noted deficits, limiting of activity due to BP, goals                   Point: Home exercise program (Not Started)     Description:   Instruct learner(s) on appropriate technique for monitoring, assisting and/or progressing therapeutic exercises/activities.              Learner Progress:  Not documented in this visit.          Point: Precautions (Not Started)     Description:   Instruct learner(s) on prescribed precautions during self-care and functional transfers.              Learner Progress:  Not documented in this visit.           Point: Body mechanics (Not Started)     Description:   Instruct learner(s) on proper positioning and spine alignment during self-care, functional mobility activities and/or exercises.              Learner Progress:  Not documented in this visit.                      User Key     Initials Effective Dates Name Provider Type Discipline    RUPAL 02/03/23 -  Regine Roper OT Occupational Therapist OT              OT Recommendation and Plan  Planned Therapy Interventions (OT): activity tolerance training, functional balance retraining, occupation/activity based interventions, strengthening exercise, transfer/mobility retraining, ROM/therapeutic exercise, patient/caregiver education/training  Therapy Frequency (OT): daily  Plan of Care Review  Plan of Care Reviewed With: patient  Progress: no change  Outcome Evaluation: OT evaluation completed, but limited her activity due to high BP.  Pt. demonstrated good transfer and ADL task performance, but fatigued quickly with 6/10 tailbone and LE pain limiting standing time.  Pt. appeares with generalized weakness. Pt. appropriate for skilled OT services to address deficit areas and promote return to PLOF.     Time Calculation:    Time Calculation- OT     Row Name 05/22/23 1100             Time Calculation- OT    OT Start Time 0848  -RUPAL      OT Received On 05/22/23  -RUPAL      OT Goal Re-Cert Due Date 06/01/23  -RUPAL         Untimed Charges    OT Eval/Re-eval Minutes 53  -RUPAL         Total Minutes    Untimed Charges Total Minutes 53  -RUPAL       Total Minutes 53  -RUPAL            User Key  (r) = Recorded By, (t) = Taken By, (c) = Cosigned By    Initials Name Provider Type    Regine Tierney OT Occupational Therapist              Therapy Charges for Today     Code Description Service Date Service Provider Modifiers Qty    [id number] HC OT EVAL LOW COMPLEXITY 4 5/22/2023 Regine Roper OT GO 1               Regine Roper OT  5/22/2023

## 2023-05-22 NOTE — ED PROVIDER NOTES
EMERGENCY DEPARTMENT ENCOUNTER    Pt Name: Gabriela Waller  MRN: [id number]  Pt :   1960  Room Number:  N637/1  Date of encounter:  2023  PCP: System, Provider Not In  ED Provider: Logan Taylor MD    Historian: EMS, patient      HPI:  Chief Complaint: Lightheaded, fall, hypotension        Context: Gabriela Waller is a 62-year-old woman with prior episodes of sepsis and hypotension from dehydration who is required the intensive care unit during her last hospitalization who presents for evaluation of hypotension.  She says she thinks she may have gotten herself dehydrated but she denies recent illness or pain.  She says throughout the night tonight every time she gets up she gets lightheaded and falls.  She denies any pain from the falls and says she has been able to ambulate but contacted EMS when they arrived her initial blood pressure was 58/42, and she was immediately started on IV fluids.  She also had a temperature of 95 in the field so was started on warmed fluids.  EMS reports that in total she got 1350 of fluids and her blood pressure upon arrival here is 108/68 and the patient does report that she is feeling better.  EMS a fingerstick blood sugar was around 130.      PAST MEDICAL HISTORY  Past Medical History:   Diagnosis Date   • Arthritis    • Chronic kidney disease    • Headache    • Heart disease    • Heart failure    • Hypertension    • Low back pain    • Osteoporosis    • Recent urinary tract infection    • Seizures          PAST SURGICAL HISTORY  Past Surgical History:   Procedure Laterality Date   •  SECTION     • COLONOSCOPY           FAMILY HISTORY  Family History   Problem Relation Age of Onset   • Arthritis Mother    • Heart disease Mother    • Hypertension Mother    • Diabetes Mother    • Asthma Father    • Heart disease Father    • Hypertension Father    • Kidney disease Father    • Kidney disease Sister          SOCIAL HISTORY  Social History      Socioeconomic History   • Marital status:    Tobacco Use   • Smoking status: Every Day     Packs/day: 1.50     Types: Cigarettes   • Smokeless tobacco: Never   Vaping Use   • Vaping Use: Never used   Substance and Sexual Activity   • Alcohol use: Yes     Comment: Occ wine   • Drug use: Never   • Sexual activity: Defer         ALLERGIES  Prunus persica        REVIEW OF SYSTEMS  Review of Systems       All systems reviewed and negative except for those discussed in HPI.       PHYSICAL EXAM    I have reviewed the triage vital signs and nursing notes.    ED Triage Vitals   Temp Heart Rate Resp BP SpO2   05/21/23 0152 05/21/23 0152 05/21/23 0152 05/21/23 0150 05/21/23 0152   97.4 °F (36.3 °C) 54 18 108/68 99 %      Temp src Heart Rate Source Patient Position BP Location FiO2 (%)   05/21/23 0152 05/21/23 0152 05/21/23 0152 05/21/23 0152 --   Oral Monitor Lying Right arm        Physical Exam  GENERAL:   Appears ill, pale, but awake and alert in no acute distress  HENT: Nares patent.  Dry mucous membranes  EYES: No scleral icterus.  CV: Regular rhythm, regular rate.  RESPIRATORY: Normal effort.  No audible wheezes, rales or rhonchi.  ABDOMEN: Soft, nontender  MUSCULOSKELETAL: No deformities.   NEURO: Alert, moves all extremities, follows commands.  SKIN: Warm, dry, no rash visualized.      LAB RESULTS  Recent Results (from the past 24 hour(s))   ECG 12 Lead ED Triage Standing Order; Weak / Dizzy / AMS    Collection Time: 05/21/23  1:52 AM   Result Value Ref Range    QT Interval 530 ms    QTC Interval 502 ms   Comprehensive Metabolic Panel    Collection Time: 05/21/23  1:57 AM    Specimen: Blood   Result Value Ref Range    Glucose 104 (H) 65 - 99 mg/dL    BUN 43 (H) 8 - 23 mg/dL    Creatinine 3.32 (H) 0.57 - 1.00 mg/dL    Sodium 130 (L) 136 - 145 mmol/L    Potassium 3.3 (L) 3.5 - 5.2 mmol/L    Chloride 90 (L) 98 - 107 mmol/L    CO2 26.0 22.0 - 29.0 mmol/L    Calcium 8.1 (L) 8.6 - 10.5 mg/dL    Total Protein 5.3  (L) 6.0 - 8.5 g/dL    Albumin 3.2 (L) 3.5 - 5.2 g/dL    ALT (SGPT) 7 1 - 33 U/L    AST (SGOT) 11 1 - 32 U/L    Alkaline Phosphatase 72 39 - 117 U/L    Total Bilirubin 0.2 0.0 - 1.2 mg/dL    Globulin 2.1 gm/dL    A/G Ratio 1.5 g/dL    BUN/Creatinine Ratio 13.0 7.0 - 25.0    Anion Gap 14.0 5.0 - 15.0 mmol/L    eGFR 14.9 (L) >60.0 mL/min/1.73   Single High Sensitivity Troponin T    Collection Time: 05/21/23  1:57 AM    Specimen: Blood   Result Value Ref Range    HS Troponin T 36 (H) <10 ng/L   Magnesium    Collection Time: 05/21/23  1:57 AM    Specimen: Blood   Result Value Ref Range    Magnesium 1.8 1.6 - 2.4 mg/dL   Green Top (Gel)    Collection Time: 05/21/23  1:57 AM   Result Value Ref Range    Extra Tube Hold for add-ons.    Lavender Top    Collection Time: 05/21/23  1:57 AM   Result Value Ref Range    Extra Tube hold for add-on    Gold Top - SST    Collection Time: 05/21/23  1:57 AM   Result Value Ref Range    Extra Tube Hold for add-ons.    Gray Top    Collection Time: 05/21/23  1:57 AM   Result Value Ref Range    Extra Tube Hold for add-ons.    Light Blue Top    Collection Time: 05/21/23  1:57 AM   Result Value Ref Range    Extra Tube Hold for add-ons.    CBC Auto Differential    Collection Time: 05/21/23  1:57 AM    Specimen: Blood   Result Value Ref Range    WBC 9.92 3.40 - 10.80 10*3/mm3    RBC 3.86 3.77 - 5.28 10*6/mm3    Hemoglobin 12.3 12.0 - 15.9 g/dL    Hematocrit 38.5 34.0 - 46.6 %    MCV 99.7 (H) 79.0 - 97.0 fL    MCH 31.9 26.6 - 33.0 pg    MCHC 31.9 31.5 - 35.7 g/dL    RDW 13.6 12.3 - 15.4 %    RDW-SD 49.8 37.0 - 54.0 fl    MPV 9.0 6.0 - 12.0 fL    Platelets 417 140 - 450 10*3/mm3    Neutrophil % 71.7 42.7 - 76.0 %    Lymphocyte % 15.9 (L) 19.6 - 45.3 %    Monocyte % 10.9 5.0 - 12.0 %    Eosinophil % 0.4 0.3 - 6.2 %    Basophil % 0.7 0.0 - 1.5 %    Immature Grans % 0.4 0.0 - 0.5 %    Neutrophils, Absolute 7.11 (H) 1.70 - 7.00 10*3/mm3    Lymphocytes, Absolute 1.58 0.70 - 3.10 10*3/mm3    Monocytes,  Absolute 1.08 (H) 0.10 - 0.90 10*3/mm3    Eosinophils, Absolute 0.04 0.00 - 0.40 10*3/mm3    Basophils, Absolute 0.07 0.00 - 0.20 10*3/mm3    Immature Grans, Absolute 0.04 0.00 - 0.05 10*3/mm3    nRBC 0.0 0.0 - 0.2 /100 WBC   Lipase    Collection Time: 05/21/23  1:57 AM    Specimen: Blood   Result Value Ref Range    Lipase 39 13 - 60 U/L   BNP    Collection Time: 05/21/23  1:57 AM    Specimen: Blood   Result Value Ref Range    proBNP 4,393.0 (H) 0.0 - 900.0 pg/mL   Lactic Acid, Plasma    Collection Time: 05/21/23  1:57 AM    Specimen: Blood   Result Value Ref Range    Lactate 2.4 (C) 0.5 - 2.0 mmol/L   Procalcitonin    Collection Time: 05/21/23  1:57 AM    Specimen: Blood   Result Value Ref Range    Procalcitonin 0.32 (H) 0.00 - 0.25 ng/mL   C-reactive Protein    Collection Time: 05/21/23  1:57 AM    Specimen: Blood   Result Value Ref Range    C-Reactive Protein 0.47 0.00 - 0.50 mg/dL   Salicylate Level    Collection Time: 05/21/23  1:57 AM    Specimen: Blood   Result Value Ref Range    Salicylate <0.3 <=30.0 mg/dL   Ethanol    Collection Time: 05/21/23  1:57 AM    Specimen: Blood   Result Value Ref Range    Ethanol <10 0 - 10 mg/dL   Acetaminophen Level    Collection Time: 05/21/23  1:57 AM    Specimen: Blood   Result Value Ref Range    Acetaminophen <5.0 0.0 - 30.0 mcg/mL   CK    Collection Time: 05/21/23  1:57 AM    Specimen: Blood   Result Value Ref Range    Creatine Kinase 47 20 - 180 U/L   Phosphorus    Collection Time: 05/21/23  1:57 AM    Specimen: Blood   Result Value Ref Range    Phosphorus 5.6 (H) 2.5 - 4.5 mg/dL   TSH    Collection Time: 05/21/23  1:57 AM    Specimen: Blood   Result Value Ref Range    TSH 3.410 0.270 - 4.200 uIU/mL   T4, Free    Collection Time: 05/21/23  1:57 AM    Specimen: Blood   Result Value Ref Range    Free T4 1.52 0.93 - 1.70 ng/dL   Cortisol    Collection Time: 05/21/23  1:57 AM    Specimen: Blood   Result Value Ref Range    Cortisol 18.66   mcg/dL   POC Glucose Once     Collection Time: 05/21/23  2:03 AM    Specimen: Blood   Result Value Ref Range    Glucose 105 70 - 130 mg/dL   POC CHEM 8    Collection Time: 05/21/23  2:05 AM    Specimen: Blood   Result Value Ref Range    Glucose 105 70 - 130 mg/dL    BUN 42 (H) 8 - 26 mg/dL    Creatinine 3.60 (H) 0.60 - 1.30 mg/dL    Sodium 128 (L) 138 - 146 mmol/L    POC Potassium 3.2 (L) 3.5 - 4.9 mmol/L    Chloride 89 (L) 98 - 109 mmol/L    Total CO2 27 24 - 29 mmol/L    Hemoglobin 12.6 12.0 - 17.0 g/dL    Hematocrit 37 (L) 38 - 51 %    Ionized Calcium 1.01 (L) 1.20 - 1.32 mmol/L    eGFR 13.6 (L) >60.0 mL/min/1.73   Respiratory Panel PCR w/COVID-19(SARS-CoV-2) [facility]TE/[facility] In-House, NP Swab in UTM/VTM, 3-4 HR TAT - Swab, Nasopharynx    Collection Time: 05/21/23  2:47 AM    Specimen: Nasopharynx; Swab   Result Value Ref Range    ADENOVIRUS, PCR Not Detected Not Detected    Coronavirus 229E Not Detected Not Detected    Coronavirus HKU1 Not Detected Not Detected    Coronavirus NL63 Not Detected Not Detected    Coronavirus OC43 Not Detected Not Detected    COVID19 Not Detected Not Detected - Ref. Range    Human Metapneumovirus Not Detected Not Detected    Human Rhinovirus/Enterovirus Not Detected Not Detected    Influenza A PCR Not Detected Not Detected    Influenza B PCR Not Detected Not Detected    Parainfluenza Virus 1 Not Detected Not Detected    Parainfluenza Virus 2 Not Detected Not Detected    Parainfluenza Virus 3 Not Detected Not Detected    Parainfluenza Virus 4 Not Detected Not Detected    RSV, PCR Not Detected Not Detected    Bordetella pertussis pcr Not Detected Not Detected    Bordetella parapertussis PCR Not Detected Not Detected    Chlamydophila pneumoniae PCR Not Detected Not Detected    Mycoplasma pneumo by PCR Not Detected Not Detected   Blood Gas, Arterial With Co-Ox    Collection Time: 05/21/23  3:28 AM    Specimen: Arterial Blood   Result Value Ref Range    Site Right Radial     Monico's Test N/A     pH,  Arterial 7.381 7.350 - 7.450 pH units    pCO2, Arterial 40.6 35.0 - 45.0 mm Hg    pO2, Arterial 72.4 (L) 83.0 - 108.0 mm Hg    HCO3, Arterial 24.0 20.0 - 26.0 mmol/L    Base Excess, Arterial -1.0 (L) 0.0 - 2.0 mmol/L    Hemoglobin, Blood Gas 11.4 (L) 14 - 18 g/dL    Hematocrit, Blood Gas 35.0 (L) 38.0 - 51.0 %    Oxyhemoglobin 91.3 (L) 94 - 99 %    Methemoglobin 0.20 0.00 - 1.50 %    Carboxyhemoglobin 3.0 (H) 0 - 2 %    CO2 Content 25.3 22 - 33 mmol/L    Temperature 37.0 C    Barometric Pressure for Blood Gas      Modality Room Air     FIO2 21 %    Rate 0 Breaths/minute    PIP 0 cmH2O    IPAP 0     EPAP 0     Note      pH, Temp Corrected 7.381 pH Units    pCO2, Temperature Corrected 40.6 35 - 45 mm Hg    pO2, Temperature Corrected 72.4 (L) 83 - 108 mm Hg   Urinalysis With Microscopic If Indicated (No Culture) - Urine, Clean Catch    Collection Time: 05/21/23  3:41 AM    Specimen: Urine, Clean Catch   Result Value Ref Range    Color, UA Yellow Yellow, Straw    Appearance, UA Cloudy (A) Clear    pH, UA 5.5 5.0 - 8.0    Specific Gravity, UA 1.014 1.001 - 1.030    Glucose, UA Negative Negative    Ketones, UA Negative Negative    Bilirubin, UA Negative Negative    Blood, UA Negative Negative    Protein, UA >=300 mg/dL (3+) (A) Negative    Leuk Esterase, UA Negative Negative    Nitrite, UA Negative Negative    Urobilinogen, UA 0.2 E.U./dL 0.2 - 1.0 E.U./dL   Urine Drug Screen - Urine, Clean Catch    Collection Time: 05/21/23  3:41 AM    Specimen: Urine, Clean Catch   Result Value Ref Range    THC, Screen, Urine Negative Negative    Phencyclidine (PCP), Urine Negative Negative    Cocaine Screen, Urine Negative Negative    Methamphetamine, Ur Negative Negative    Opiate Screen Negative Negative    Amphetamine Screen, Urine Negative Negative    Benzodiazepine Screen, Urine Negative Negative    Tricyclic Antidepressants Screen Negative Negative    Methadone Screen, Urine Negative Negative    Barbiturates Screen, Urine  Negative Negative    Oxycodone Screen, Urine Negative Negative    Propoxyphene Screen Negative Negative    Buprenorphine, Screen, Urine Negative Negative   Urinalysis, Microscopic Only - Urine, Clean Catch    Collection Time: 05/21/23  3:41 AM    Specimen: Urine, Clean Catch   Result Value Ref Range    RBC, UA 0-2 None Seen, 0-2 /HPF    WBC, UA 6-12 (A) None Seen, 0-2 /HPF    Bacteria, UA None Seen None Seen, Trace /HPF    Squamous Epithelial Cells, UA 31-50 (A) None Seen, 0-2 /HPF    Hyaline Casts, UA 7-12 0 - 6 /LPF    Methodology Automated Microscopy    STAT Lactic Acid, Reflex    Collection Time: 05/21/23  5:08 AM    Specimen: Blood   Result Value Ref Range    Lactate 0.8 0.5 - 2.0 mmol/L   Cortisol    Collection Time: 05/21/23 11:15 AM    Specimen: Blood   Result Value Ref Range    Cortisol 23.60   mcg/dL   Potassium    Collection Time: 05/21/23 11:43 AM    Specimen: Blood   Result Value Ref Range    Potassium 3.9 3.5 - 5.2 mmol/L   Cortisol    Collection Time: 05/21/23 11:43 AM    Specimen: Blood   Result Value Ref Range    Cortisol 27.40   mcg/dL       If labs were ordered, I independently reviewed the results and considered them in treating the patient.        RADIOLOGY  CT Abdomen Pelvis Without Contrast    Result Date: 5/21/2023  EXAMINATION: CT ABDOMEN PELVIS WO CONTRAST DATE: 5/21/2023 4:32 AM INDICATION:  Sepsis, hypotension, acute renal failure ; COMPARISON: None. PROCEDURE:   CT of the abdomen and pelvis was performed with multiplanar reformatted images without intravenous contrast enhancement.  The exam is limited because some types of pathology may not be adequately demonstrated due to lack of contrast enhancement.  CT dose lowering techniques were used, to include: automated exposure control, adjustment for patient size, and or use of iterative reconstruction. FINDINGS: LOWER CHEST :  Unremarkable. ABDOMEN: Liver and Biliary system: No suspicious focal liver lesion on noncontrast exam. No biliary  ductal dilation. Gallbladder:  Normal. Spleen:  Normal. Pancreas:  Normal. Adrenal glands:  Normal. Kidneys and ureters:  Moderate cortical atrophy right kidney. Probable small exophytic cyst lower pole right kidney and interpolar left kidney. No radiopaque urinary tract calculus. No hydronephrosis. Aorta/IVC:  Moderate atherosclerotic calcifications.  No abdominal aortic aneurysm.  IVC not dilated. Lymph nodes:  No visible abdominal lymphadenopathy. Gastrointestinal tract:  Stomach normal. Small bowel not dilated or thick-walled. Appendix normal. No colonic wall thickening or dilation. Peritoneum/Mesentery:  No ascites.  No pneumoperitoneum. Abdominal wall:  Postsurgical changes anterior abdominal wall. PELVIS: Urinary bladder:  Unremarkable. Reproductive organs:  5.5 cm circumscribed mass with internal lobules of fat inseparable from the uterus and right adnexa. Lymph Nodes:  No pelvic lymphadenopathy.. BONES:  Degenerative changes in the spine..      1.  5.5 cm fat-containing pelvic mass, uterine likely leiomyoma or ovarian dermoid. Recommend follow-up pelvic ultrasound in one year. 2.  Otherwise, no acute abnormality within limitations of unenhanced exam. 3.  Chronic findings as above. Electronically signed by:  Keiko Orozco M.D.  5/21/2023 3:11 AM Mountain Time    XR Chest 1 View    Result Date: 5/21/2023  PROCEDURE:   XR CHEST 1 VW HISTORY:   Weakness COMPARISONS: 3/16/2023 FINDINGS: Lungs and pleura are clear. Smooth cardiomediastinal contours. Regional bones and soft tissues show no acute abnormality.     No acute cardiopulmonary abnormality. Electronically signed by:  Delfino Camacho D.O.  5/21/2023 12:54 AM Mountain Time      I ordered and independently reviewed the above noted radiographic studies.      I viewed images of chest x-ray which is clear.  CT scan of the abdomen pelvis which shows uterine fibroid but no renal obstruction, bowel inflammation, or other pathology that I can  appreciate.      See radiologist's dictation for official interpretation.        PROCEDURES    Procedures    ECG 12 Lead ED Triage Standing Order; Weak / Dizzy / AMS   Preliminary Result   Test Reason : ED Triage Standing Order~   Blood Pressure :   */*   mmHG   Vent. Rate :  54 BPM     Atrial Rate :  54 BPM      P-R Int : 136 ms          QRS Dur :  84 ms       QT Int : 530 ms       P-R-T Axes :  43  57  80 degrees      QTc Int : 502 ms      Sinus bradycardia   Nonspecific T wave abnormality   Prolonged QT   Abnormal ECG   When compared with ECG of 16-MAR-2023 21:51,   No significant change was found      Referred By: EDMD           Confirmed By:           MEDICATIONS GIVEN IN ER    Medications   sodium chloride 0.9 % flush 10 mL (has no administration in time range)   Calcium Replacement - Follow Nurse / BPA Driven Protocol (has no administration in time range)   potassium chloride 10 mEq in 100 mL IVPB (10 mEq Intravenous Handoff 5/21/23 0905)   sodium chloride 0.9 % infusion (125 mL/hr Intravenous Currently Infusing 5/21/23 1630)   sodium chloride 0.9 % flush 10 mL (10 mL Intravenous Given 5/21/23 1221)   sodium chloride 0.9 % flush 10 mL (has no administration in time range)   sodium chloride 0.9 % infusion 40 mL (has no administration in time range)   sennosides-docusate (PERICOLACE) 8.6-50 MG per tablet 2 tablet (2 tablets Oral Given 5/21/23 1220)     And   polyethylene glycol (MIRALAX) packet 17 g (has no administration in time range)     And   bisacodyl (DULCOLAX) EC tablet 5 mg (has no administration in time range)     And   bisacodyl (DULCOLAX) suppository 10 mg (has no administration in time range)   heparin (porcine) 5000 UNIT/ML injection 5,000 Units (5,000 Units Subcutaneous Given 5/21/23 1405)   Potassium Replacement - Follow Nurse / BPA Driven Protocol (has no administration in time range)   Magnesium Low Dose Replacement - Follow Nurse / BPA Driven Protocol (has no administration in time range)    Phosphorus Replacement - Follow Nurse / BPA Driven Protocol (has no administration in time range)   acetaminophen (TYLENOL) tablet 650 mg (has no administration in time range)     Or   acetaminophen (TYLENOL) 160 MG/5ML solution 650 mg (has no administration in time range)     Or   acetaminophen (TYLENOL) suppository 650 mg (has no administration in time range)   albuterol (PROVENTIL) nebulizer solution 0.083% 2.5 mg/3mL (has no administration in time range)   aspirin EC tablet 81 mg (81 mg Oral Given 5/21/23 1404)   atorvastatin (LIPITOR) tablet 80 mg (has no administration in time range)   clopidogrel (PLAVIX) tablet 75 mg (75 mg Oral Given 5/21/23 1404)   escitalopram (LEXAPRO) tablet 20 mg (20 mg Oral Given 5/21/23 1404)   folic acid (FOLVITE) tablet 1 mg (1 mg Oral Given 5/21/23 1404)   levETIRAcetam (KEPPRA) tablet 500 mg (500 mg Oral Given 5/21/23 1404)   mirtazapine (REMERON) tablet 15 mg (has no administration in time range)   traZODone (DESYREL) tablet 50 mg (has no administration in time range)   nicotine (NICODERM CQ) 21 MG/24HR patch 1 patch (1 patch Transdermal Medication Applied 5/21/23 1629)   sodium chloride 0.9 % bolus 1,000 mL (0 mL Intravenous Stopped 5/21/23 0346)   vancomycin 1250 mg/250 mL 0.9% NS IVPB ([facility]) (0 mg Intravenous Stopped 5/21/23 0509)   piperacillin-tazobactam (ZOSYN) 3.375 g in iso-osmotic dextrose 50 ml (premix) (0 g Intravenous Stopped 5/21/23 0346)   acetaminophen (TYLENOL) tablet 1,000 mg (1,000 mg Oral Given 5/21/23 0312)   ondansetron (ZOFRAN) injection 4 mg (4 mg Intravenous Given 5/21/23 0311)   sodium chloride 0.9 % bolus 1,000 mL (0 mL Intravenous Stopped 5/21/23 0509)   magnesium sulfate in D5W 1g/100mL (PREMIX) (0 g Intravenous Stopped 5/21/23 0701)   midodrine (PROAMATINE) tablet 2.5 mg (2.5 mg Oral Given 5/21/23 0509)   albumin human 25 % IV SOLN 12.5 g (0 g Intravenous Stopped 5/21/23 0700)   cosyntropin (CORTROSYN) injection 0.25 mg (0.25 mg Intravenous Given  5/21/23 1037)   calcium gluconate 1g/50ml 0.675% NaCl IV SOLN (1 g Intravenous New Bag 5/21/23 1220)         MEDICAL DECISION MAKING, PROGRESS, and CONSULTS    All labs have been independently reviewed by me.  All radiology studies have been reviewed by me and the radiologist dictating the report.  All EKG's have been independently viewed and interpreted by me.      Discussion below represents my analysis of pertinent findings related to patient's condition, differential diagnosis, treatment plan and final disposition.      Differential diagnosis:    Sepsis, cardiogenic shock, dehydration, pneumonia, bacteremia, intra-abdominal infection, renal obstruction, CHF, anemia, electrolyte abnormality      Additional sources:    - Discussed/ obtained information from independent historians: EMS    - External (non-ED) record review: Recent hospitalization with ICU stay for sepsis and hypotension that responded to fluid resuscitation    - Chronic or social conditions impacting care: Recurrent hypotension, history of dehydration    - Shared decision making: Agreeable to hospital admission      Orders placed during this visit:  Orders Placed This Encounter   Procedures   • COVID PRE-OP / PRE-PROCEDURE SCREENING ORDER (NO ISOLATION) - Swab, Nasopharynx   • Blood Culture - Blood,   • Blood Culture - Blood,   • Respiratory Panel PCR w/COVID-19(SARS-CoV-2) [facility]INDY/[facility]TE/[facility] In-House, NP Swab in UTM/VTM, 3-4 HR TAT - Swab, Nasopharynx   • XR Chest 1 View   • CT Abdomen Pelvis Without Contrast   • [place] Draw   • Comprehensive Metabolic Panel   • Single High Sensitivity Troponin T   • Magnesium   • Urinalysis With Microscopic If Indicated (No Culture) - Urine, Clean Catch   • CBC Auto Differential   • Lipase   • Blood Gas, Arterial -With Co-Ox Panel: Yes   • BNP   • Lactic Acid, Plasma   • Procalcitonin   • C-reactive Protein   • Urine Drug Screen - Urine, Clean Catch   • Salicylate Level   • Ethanol   • Acetaminophen  Level   • CK   • Phosphorus   • TSH   • T4, Free   • STAT Lactic Acid, Reflex   • Blood Gas, Arterial With Co-Ox   • Urinalysis, Microscopic Only - Urine, Clean Catch   • Potassium   • Cortisol   • ACTH   • Cortisol   • Vitamin B12   • Comprehensive Metabolic Panel   • Magnesium   • Basic Metabolic Panel   • Diet: Regular/House Diet; Texture: Regular Texture (IDDSI 7); Fluid Consistency: Thin (IDDSI 0)   • Undress & Gown   • Continuous Pulse Oximetry   • Vital Signs   • Orthostatic Blood Pressure   • Vital Signs   • Intake & Output   • Weigh Patient   • Oral Care   • Saline Lock & Maintain IV Access   • Activity - Ad Nadine   • Measure Weight   • Dietary Nutrition Supplements Boost Plus   • OT Consult: Eval & Treat   • PT Consult: Eval & Treat Functional Mobility Below Baseline   • Oxygen Therapy- Nasal Cannula; Titrate 1-6 LPM Per SpO2; 90 - 95%   • Incentive Spirometry   • POC Glucose Once   • POC Glucose Once   • POC CHEM 8   • ECG 12 Lead ED Triage Standing Order; Weak / Dizzy / AMS   • Insert Peripheral IV   • Insert Peripheral IV   • Inpatient Admission   • ED Bed Request   • Fall Precautions   • CBC & Differential   • Green Top (Gel)   • Lavender Top   • Gold Top - SST   • Gray Top   • Light Blue Top   • CBC & Differential         Additional orders considered but not ordered:      ED Course:    Consultants:      ED Course as of 05/21/23 2006   Sun May 21, 2023   0240 In summary this is a 62-year-old woman with prior episodes of sepsis and hypotension from dehydration who is required the intensive care unit during her last hospitalization who presents for evaluation of hypotension.  She says she thinks she may have gotten herself dehydrated but she denies recent illness or pain.  She says throughout the night tonight every time she gets up she gets lightheaded and falls.  She denies any pain from the falls and says she has been able to ambulate but contacted EMS when they arrived her initial blood pressure was  58/42, and she was immediately started on IV fluids.  She also had a temperature of 95 in the field so was started on warmed fluids.  EMS reports that in total she got 1350 of fluids and her blood pressure upon arrival here is 108/68 and the patient does report that she is feeling better.  EMS a fingerstick blood sugar was around 130. [CC]      ED Course User Index  [CC] Logan Taylor MD     She arrived awake and alert but ill-appearing, dry mucous membranes, initial pressure here generally reassuring at 108/68 however pressures remain soft she has received about 1300 of crystalloid from EMS and was given another 2 L here with stable blood pressures.  MAP still in the low 70s ultimately started on midodrine.  Because of the borderline hypothermia for EMS starting on broad-spectrum antibiotics and treating as sepsis.  CBC does not show leukocytosis or anemia reassuringly.  She does have lactic acidemia with lactate of 2.4.  Hyperphosphatemia 5.6 she is already receiving IV fluids.  CMP shows acute renal failure creatinine of 3.32 was 1.26 a couple of months ago.  She has hyponatremia and hypokalemia hypocalcemia and hypoalbuminemia.  Starting electrolyte repletion protocols and transfusing albumin.  Chest x-ray is clear.  With her acute renal failure and concern for obstruction obtain CT scan of the abdomen pelvis which shows uterine fibroid but no evidence of renal obstruction.  Full viral panel is negative.  No acidosis on blood gas.  Toxicologic screening is negative.  At this point for the last few hours she has maintain maps in the 70s I think she is stable for telemetry level care and have discussed with Dr. Hernandez with the medicine team.    45 minutes of critical care provided. This time excludes other billable procedures. Time does include preparation of documents, medical consultations, review of old records, and direct bedside care. Patient is at high risk for life-threatening deterioration due to  acute hypotension in the setting of hypovolemia managed as sepsis and treated with aggressive crystalloid resuscitation also requiring IV albumin and oral midodrine..              AS OF 20:06 EDT VITALS:    BP - 140/79  HR - 73  TEMP - 98.4 °F (36.9 °C) (Oral)  O2 SATS - 97%                  DIAGNOSIS  Final diagnoses:   Hypotension, unspecified hypotension type   Acute renal failure, unspecified acute renal failure type   Dehydration   Lactic acidemia         DISPOSITION  Admit      Please note that portions of this document were completed with voice recognition software.        Logan Taylor MD  05/21/23 2012

## 2023-05-22 NOTE — PLAN OF CARE
Goal Outcome Evaluation:  Plan of Care Reviewed With: patient        Progress: no change  Outcome Evaluation: Patient presents with weakness, balance deficits, and pain affecting functional mobility. Evaluation limited by pt's hypertension, but she ambulated a short distance with CGA. Skilled IP PT warranted to address deficits and support return to independence. Anticipate pt will be appropriate to return home with assist and OP PT at discharge.

## 2023-05-22 NOTE — CASE MANAGEMENT/SOCIAL WORK
Discharge Planning Assessment  [facility]     Patient Name: Gabriela Waller  MRN: [id number]  Today's Date: 5/22/2023    Admit Date: 5/21/2023    Plan: Home   Discharge Needs Assessment     Row Name 05/22/23 1023       Living Environment    People in Home child(sangita), adult    Current Living Arrangements home    Potentially Unsafe Housing Conditions none    Primary Care Provided by self       Transition Planning    Patient/Family Anticipates Transition to home with family    Transportation Anticipated family or friend will provide       Discharge Needs Assessment    Equipment Currently Used at Home cane, straight    Equipment Needed After Discharge none               Discharge Plan     Row Name 05/22/23 1024       Plan    Plan Home    Patient/Family in Agreement with Plan yes    Plan Comments Spoke with patient at bedside. Patient lives with Daughter in [facility]. She is independent of all ADL's. Uses a cane for mobility. She is not current with HH. She couldn't remember who her PCP is, but states that she goes to a clinic by her house. Insurance is [facility] and [facility]. Her discharge plan is home with son-in-law to transport. CM will follow.    Final Discharge Disposition Code 01 - home or self-care              Continued Care and Services - Admitted Since 5/21/2023    Coordination has not been started for this encounter.       Expected Discharge Date and Time     Expected Discharge Date Expected Discharge Time    May 23, 2023          Demographic Summary     Row Name 05/22/23 1023       General Information    Admission Type observation    Preferred Language English               Functional Status     Row Name 05/22/23 1023       Functional Status    Usual Activity Tolerance moderate    Current Activity Tolerance moderate       Functional Status, IADL    Medications independent    Meal Preparation assistive equipment    Housekeeping assistive equipment    Laundry  assistive equipment    Shopping assistive equipment               Psychosocial    No documentation.                Abuse/Neglect    No documentation.                Legal    No documentation.                Substance Abuse    No documentation.                Patient Forms    No documentation.                   Radha Constantino RN

## 2023-05-22 NOTE — SIGNIFICANT NOTE
Called per nursing secondary to positive blood culture. Only one has resulted. BCID staph spp, not aureus or lugdunensis. Spoke with pharmacy. Likely contaminate. Hold further abx pending second blood cx result. Did receive Vanc and Zosyn x 1 on admission

## 2023-05-23 LAB
ANION GAP SERPL CALCULATED.3IONS-SCNC: 10 MMOL/L (ref 5–15)
BACTERIA SPEC AEROBE CULT: ABNORMAL
BUN SERPL-MCNC: 27 MG/DL (ref 8–23)
BUN/CREAT SERPL: 19.1 (ref 7–25)
CALCIUM SPEC-SCNC: 8.4 MG/DL (ref 8.6–10.5)
CHLORIDE SERPL-SCNC: 109 MMOL/L (ref 98–107)
CO2 SERPL-SCNC: 23 MMOL/L (ref 22–29)
CREAT SERPL-MCNC: 1.41 MG/DL (ref 0.57–1)
EGFRCR SERPLBLD CKD-EPI 2021: 41.7 ML/MIN/1.73
GLUCOSE SERPL-MCNC: 86 MG/DL (ref 65–99)
GRAM STN SPEC: ABNORMAL
ISOLATED FROM: ABNORMAL
MAGNESIUM SERPL-MCNC: 1.6 MG/DL (ref 1.6–2.4)
POTASSIUM SERPL-SCNC: 3.5 MMOL/L (ref 3.5–5.2)
POTASSIUM SERPL-SCNC: 4.5 MMOL/L (ref 3.5–5.2)
SODIUM SERPL-SCNC: 142 MMOL/L (ref 136–145)

## 2023-05-23 PROCEDURE — G0378 HOSPITAL OBSERVATION PER HR: HCPCS

## 2023-05-23 PROCEDURE — 96376 TX/PRO/DX INJ SAME DRUG ADON: CPT

## 2023-05-23 PROCEDURE — 25010000002 HYDRALAZINE PER 20 MG: Performed by: INTERNAL MEDICINE

## 2023-05-23 PROCEDURE — 96361 HYDRATE IV INFUSION ADD-ON: CPT

## 2023-05-23 PROCEDURE — 80048 BASIC METABOLIC PNL TOTAL CA: CPT | Performed by: INTERNAL MEDICINE

## 2023-05-23 PROCEDURE — 99232 SBSQ HOSP IP/OBS MODERATE 35: CPT | Performed by: INTERNAL MEDICINE

## 2023-05-23 PROCEDURE — 83735 ASSAY OF MAGNESIUM: CPT | Performed by: INTERNAL MEDICINE

## 2023-05-23 PROCEDURE — 96372 THER/PROPH/DIAG INJ SC/IM: CPT

## 2023-05-23 PROCEDURE — 84132 ASSAY OF SERUM POTASSIUM: CPT | Performed by: INTERNAL MEDICINE

## 2023-05-23 PROCEDURE — 25010000002 HEPARIN (PORCINE) PER 1000 UNITS: Performed by: INTERNAL MEDICINE

## 2023-05-23 RX ORDER — POTASSIUM CHLORIDE 750 MG/1
40 CAPSULE, EXTENDED RELEASE ORAL EVERY 4 HOURS
Status: COMPLETED | OUTPATIENT
Start: 2023-05-23 | End: 2023-05-23

## 2023-05-23 RX ORDER — HYDRALAZINE HYDROCHLORIDE 10 MG/1
10 TABLET, FILM COATED ORAL EVERY 8 HOURS SCHEDULED
Status: DISCONTINUED | OUTPATIENT
Start: 2023-05-23 | End: 2023-05-24

## 2023-05-23 RX ORDER — SODIUM CHLORIDE 9 MG/ML
50 INJECTION, SOLUTION INTRAVENOUS CONTINUOUS
Status: DISCONTINUED | OUTPATIENT
Start: 2023-05-23 | End: 2023-05-24

## 2023-05-23 RX ORDER — GABAPENTIN 100 MG/1
200 CAPSULE ORAL DAILY
Status: DISCONTINUED | OUTPATIENT
Start: 2023-05-24 | End: 2023-05-24 | Stop reason: HOSPADM

## 2023-05-23 RX ADMIN — ESCITALOPRAM OXALATE 20 MG: 20 TABLET ORAL at 08:38

## 2023-05-23 RX ADMIN — Medication 10 ML: at 08:52

## 2023-05-23 RX ADMIN — HEPARIN SODIUM 5000 UNITS: 5000 INJECTION INTRAVENOUS; SUBCUTANEOUS at 12:36

## 2023-05-23 RX ADMIN — SENNOSIDES AND DOCUSATE SODIUM 2 TABLET: 50; 8.6 TABLET ORAL at 08:38

## 2023-05-23 RX ADMIN — BUSPIRONE HYDROCHLORIDE 5 MG: 10 TABLET ORAL at 08:38

## 2023-05-23 RX ADMIN — SODIUM CHLORIDE 100 ML/HR: 9 INJECTION, SOLUTION INTRAVENOUS at 08:43

## 2023-05-23 RX ADMIN — CLOPIDOGREL BISULFATE 75 MG: 75 TABLET ORAL at 08:38

## 2023-05-23 RX ADMIN — POTASSIUM CHLORIDE 40 MEQ: 10 CAPSULE, COATED, EXTENDED RELEASE ORAL at 08:52

## 2023-05-23 RX ADMIN — METOPROLOL SUCCINATE 25 MG: 25 TABLET, EXTENDED RELEASE ORAL at 08:38

## 2023-05-23 RX ADMIN — BUSPIRONE HYDROCHLORIDE 5 MG: 10 TABLET ORAL at 21:53

## 2023-05-23 RX ADMIN — Medication 81 MG: at 08:38

## 2023-05-23 RX ADMIN — HEPARIN SODIUM 5000 UNITS: 5000 INJECTION INTRAVENOUS; SUBCUTANEOUS at 05:00

## 2023-05-23 RX ADMIN — MIRTAZAPINE 15 MG: 15 TABLET, FILM COATED ORAL at 21:53

## 2023-05-23 RX ADMIN — FOLIC ACID 1 MG: 1 TABLET ORAL at 08:38

## 2023-05-23 RX ADMIN — POTASSIUM CHLORIDE 40 MEQ: 10 CAPSULE, COATED, EXTENDED RELEASE ORAL at 12:34

## 2023-05-23 RX ADMIN — ATORVASTATIN CALCIUM 80 MG: 40 TABLET, FILM COATED ORAL at 21:53

## 2023-05-23 RX ADMIN — TRAZODONE HYDROCHLORIDE 50 MG: 50 TABLET ORAL at 21:53

## 2023-05-23 RX ADMIN — AMLODIPINE BESYLATE 10 MG: 10 TABLET ORAL at 08:38

## 2023-05-23 RX ADMIN — HYDRALAZINE HYDROCHLORIDE 10 MG: 20 INJECTION INTRAMUSCULAR; INTRAVENOUS at 23:37

## 2023-05-23 RX ADMIN — LEVETIRACETAM 500 MG: 500 TABLET, FILM COATED ORAL at 21:53

## 2023-05-23 RX ADMIN — GABAPENTIN 100 MG: 100 CAPSULE ORAL at 08:43

## 2023-05-23 RX ADMIN — HEPARIN SODIUM 5000 UNITS: 5000 INJECTION INTRAVENOUS; SUBCUTANEOUS at 21:54

## 2023-05-23 RX ADMIN — LEVETIRACETAM 500 MG: 500 TABLET, FILM COATED ORAL at 08:38

## 2023-05-23 RX ADMIN — Medication 1 PATCH: at 08:38

## 2023-05-23 RX ADMIN — HYDRALAZINE HYDROCHLORIDE 10 MG: 10 TABLET, FILM COATED ORAL at 21:53

## 2023-05-23 NOTE — PROGRESS NOTES
[facility]  PROGRESS NOTE    Patient Name: Gabriela Waller  : 1960  MRN: [id number]    Date of Admission: 2023  Primary Care Physician: System, Provider Not In    Subjective   Subjective     CC: hypotension, BOBY      HPI:  Feels good, no cough or fever    ROS:  Gen: no fever  Pulm: no cough      Objective   Objective     Vital Signs:   Temp:  [96.3 °F (35.7 °C)-98 °F (36.7 °C)] 97.1 °F (36.2 °C)  Heart Rate:  [60-97] 75  Resp:  [16] 16  BP: (147-215)/() 163/96     Physical Exam:  Constitutional - no acute distress, nontoxic, in bed  HEENT-NCAT, mucous membranes moist  CV-RRR  Resp-CTAB  Abd-soft, nontender, nondistended, normoactive bowel sounds  Ext-No lower extremity cyanosis, clubbing or edema bilaterally  Neuro-alert, speech clear, moves all extremities   Psych-normal affect   Skin- No rash on exposed UE or LE bilaterally        Results Reviewed:  LAB RESULTS:      Lab 23  0451 23  0508 23  0205 23  0157 23   WBC 9.26  --   --  9.92 10.99*   HEMOGLOBIN 12.0  --   --  12.3 12.5   HEMOGLOBIN, POC  --   --  12.6  --   --    HEMATOCRIT 38.2  --   --  38.5 38.5   HEMATOCRIT POC  --   --  37*  --   --    PLATELETS 405  --   --  417 441   NEUTROS ABS 6.28  --   --  7.11* 6.98   IMMATURE GRANS (ABS) 0.03  --   --  0.04 0.03   LYMPHS ABS 1.88  --   --  1.58 2.68   MONOS ABS 0.87  --   --  1.08* 0.97*   EOS ABS 0.10  --   --  0.04 0.24   .1*  --   --  99.7* 98.2*   CRP  --   --   --  0.47  --    PROCALCITONIN  --   --   --  0.32* 0.07   LACTATE  --  0.8  --  2.4* 1.2         Lab 23  0552 23  0451 23  1143 23  0205 23  0157 23   SODIUM 142 141  --   --  130* 138   POTASSIUM 3.5 3.8 3.9  --  3.3* 3.5   CHLORIDE 109* 109*  --   --  90* 98   CO2 23.0 21.0*  --   --  26.0 26.0   ANION GAP 10.0 11.0  --   --  14.0 14.0   BUN 27* 32*  --   --  43* 41*   CREATININE 1.41* 2.02*  --   3.60* 3.32* 1.99*   EGFR 41.7* 27.1*  --  13.6* 14.9* 27.6*   GLUCOSE 86 73  --   --  104* 94   CALCIUM 8.4* 8.2*  --   --  8.1* 9.1   MAGNESIUM 1.6 2.0  --   --  1.8  --    PHOSPHORUS  --   --   --   --  5.6*  --    TSH  --   --   --   --  3.410  --          Lab 05/22/23  0451 05/21/23 0157 05/17/23 2014   TOTAL PROTEIN 5.0* 5.3* 6.4   ALBUMIN 3.3* 3.2* 3.9   GLOBULIN 1.7 2.1 2.5   ALT (SGPT) 7 7 9   AST (SGOT) 11 11 15   BILIRUBIN 0.2 0.2 <0.2   ALK PHOS 60 72 95   LIPASE  --  39  --          Lab 05/21/23 0157   PROBNP 4,393.0*   HSTROP T 36*             Lab 05/21/23  0157   VITAMIN B 12 403         Lab 05/21/23  0328   PH, ARTERIAL 7.381   PCO2, ARTERIAL 40.6   PO2 ART 72.4*   FIO2 21   HCO3 ART 24.0   BASE EXCESS ART -1.0*   CARBOXYHEMOGLOBIN 3.0*     Brief Urine Lab Results  (Last result in the past 365 days)      Color   Clarity   Blood   Leuk Est   Nitrite   Protein   CREAT   Urine HCG        05/21/23 0341 Yellow   Cloudy   Negative   Negative   Negative   >=300 mg/dL (3+)                 Microbiology Results Abnormal     Procedure Component Value - Date/Time    Blood Culture - Blood, Arm, Left [[id number]]  (Normal) Collected: 05/21/23 0232    Lab Status: Preliminary result Specimen: Blood from Arm, Left Updated: 05/23/23 0646     Blood Culture No growth at 2 days    COVID PRE-OP / PRE-PROCEDURE SCREENING ORDER (NO ISOLATION) - Swab, Nasopharynx [[id number]]  (Normal) Collected: 05/21/23 0247    Lab Status: Final result Specimen: Swab from Nasopharynx Updated: 05/21/23 0414    Narrative:      The following orders were created for panel order COVID PRE-OP / PRE-PROCEDURE SCREENING ORDER (NO ISOLATION) - Swab, Nasopharynx.  Procedure                               Abnormality         Status                     ---------                               -----------         ------                     Respiratory Panel PCR w/...[[id number]]  Normal              Final result                 Please view results for  these tests on the individual orders.    Respiratory Panel PCR w/COVID-19(SARS-CoV-2) DERRELL/CINDY/DIAMANTE/PAD/COR/MAD/WALE In-House, NP Swab in UTM/VTM, 3-4 HR TAT - Swab, Nasopharynx [[id number]]  (Normal) Collected: 05/21/23 0247    Lab Status: Final result Specimen: Swab from Nasopharynx Updated: 05/21/23 0414     ADENOVIRUS, PCR Not Detected     Coronavirus 229E Not Detected     Coronavirus HKU1 Not Detected     Coronavirus NL63 Not Detected     Coronavirus OC43 Not Detected     COVID19 Not Detected     Human Metapneumovirus Not Detected     Human Rhinovirus/Enterovirus Not Detected     Influenza A PCR Not Detected     Influenza B PCR Not Detected     Parainfluenza Virus 1 Not Detected     Parainfluenza Virus 2 Not Detected     Parainfluenza Virus 3 Not Detected     Parainfluenza Virus 4 Not Detected     RSV, PCR Not Detected     Bordetella pertussis pcr Not Detected     Bordetella parapertussis PCR Not Detected     Chlamydophila pneumoniae PCR Not Detected     Mycoplasma pneumo by PCR Not Detected    Narrative:      In the setting of a positive respiratory panel with a viral infection PLUS a negative procalcitonin without other underlying concern for bacterial infection, consider observing off antibiotics or discontinuation of antibiotics and continue supportive care. If the respiratory panel is positive for atypical bacterial infection (Bordetella pertussis, Chlamydophila pneumoniae, or Mycoplasma pneumoniae), consider antibiotic de-escalation to target atypical bacterial infection.          No radiology results from the last 24 hrs    Results for orders placed during the hospital encounter of 03/16/23    Adult Transthoracic Echo Complete W/ Cont if Necessary Per Protocol    Interpretation Summary  •  Left ventricular systolic function is normal. Calculated left ventricular EF = 60.3%  •  Left ventricular diastolic function was normal.  •  Left atrial volume is mildly increased.  •  Estimated right ventricular systolic  pressure from tricuspid regurgitation is normal (<35 mmHg).      Current medications:  Scheduled Meds:amLODIPine, 10 mg, Oral, Daily  aspirin, 81 mg, Oral, Daily  atorvastatin, 80 mg, Oral, Nightly  busPIRone, 5 mg, Oral, BID  clopidogrel, 75 mg, Oral, Daily  escitalopram, 20 mg, Oral, Daily  folic acid, 1 mg, Oral, Daily  gabapentin, 100 mg, Oral, Daily  heparin (porcine), 5,000 Units, Subcutaneous, Q8H  levETIRAcetam, 500 mg, Oral, Q12H  metoprolol succinate XL, 25 mg, Oral, Daily  mirtazapine, 15 mg, Oral, Nightly  nicotine, 1 patch, Transdermal, Q24H  senna-docusate sodium, 2 tablet, Oral, BID  sodium chloride, 10 mL, Intravenous, Q12H  traZODone, 50 mg, Oral, Nightly      Continuous Infusions:sodium chloride, 100 mL/hr, Last Rate: 100 mL/hr (05/23/23 0843)      PRN Meds:.•  acetaminophen **OR** acetaminophen **OR** acetaminophen  •  albuterol  •  senna-docusate sodium **AND** polyethylene glycol **AND** bisacodyl **AND** bisacodyl  •  Calcium Replacement - Follow Nurse / BPA Driven Protocol  •  hydrALAZINE  •  HYDROcodone-acetaminophen  •  Magnesium Low Dose Replacement - Follow Nurse / BPA Driven Protocol  •  Phosphorus Replacement - Follow Nurse / BPA Driven Protocol  •  Potassium Replacement - Follow Nurse / BPA Driven Protocol  •  simethicone  •  sodium chloride  •  sodium chloride  •  sodium chloride    Assessment & Plan   Assessment & Plan     Active Hospital Problems    Diagnosis  POA   • **Hypotension [I95.9]  Yes   • Hypotension, unspecified hypotension type [I95.9]  Yes      Resolved Hospital Problems   No resolved problems to display.        Brief Hospital Course to date:  Gabriela Waller is a 62 y.o. female with history of CKD III, CVA, seizure disorder and recent hospitalization for hypovolemic shock and BOBY felt due to volume depletion (diarrhea) and polypharmacy (multiple BP meds), presents with hypotension, weakness and BOBY      Hypotension  Volume depletion  BOBY on CKD III  - BP and  creatinine improved with IV fluid hydration, today creatinine 1.4  - suspect accidental overuse of home antihypertensives -- family to start managing home medications  - bmp am  - follow up with PCP early next week with bmp    HTN  - resume norvac, holding ARB and aldactone  - add back hydralazine 10 mg TID (prescribed at recent discharge)    H/o CVA  Dyslipidemia  - DAPT, Statin    H/o seizures  - keppra    Peripheral neuropathy  - lowered dose of gabapentin in setting of BOBY    Tobacco abuse  - nicotine replacement,  cessation    Generalized weakness  - PT/OT    Uterine mass  - differential includes leiomyoma and ovarian dermoid  - discussed with patient, including the need for close followup and additional evaluation with pelvic ultrasound to rule out other etiologies  - consider referral to gynecology at discharge      Expected Discharge Location and Transportation: home  Expected Discharge   Expected Discharge Date: 5/24/2023; Expected Discharge Time:      DVT prophylaxis:  Medical DVT prophylaxis orders are present.     AM-PAC 6 Clicks Score (PT): 20 (05/22/23 5771)    CODE STATUS:   There are no questions and answers to display.       Omero Puente MD  05/23/23

## 2023-05-23 NOTE — PLAN OF CARE
Problem: Adult Inpatient Plan of Care  Goal: Plan of Care Review  Outcome: Ongoing, Progressing  Flowsheets (Taken 5/23/2023 1731)  Progress: improving  Plan of Care Reviewed With: patient  Outcome Evaluation: pt. on room air. no complains of pain this shift. VS normal. NSR. Continue POC.   Goal Outcome Evaluation:  Plan of Care Reviewed With: patient        Progress: improving  Outcome Evaluation: pt. on room air. no complains of pain this shift. VS normal. NSR. Continue POC.

## 2023-05-23 NOTE — PLAN OF CARE
Goal Outcome Evaluation:      Patient on room air.  SB-SR on monitor.  IVF going.  Continuing plan of care.  Standby to bathroom.

## 2023-05-23 NOTE — PLAN OF CARE
Goal Outcome Evaluation:            Room air.  Anticipating discharge today.  Standby assist.  SB-NSR on monitor.  Alert and oriented.

## 2023-05-24 ENCOUNTER — READMISSION MANAGEMENT (OUTPATIENT)
Dept: CALL CENTER | Facility: HOSPITAL | Age: 63
End: 2023-05-24
Payer: MEDICARE

## 2023-05-24 VITALS
TEMPERATURE: 97.5 F | HEIGHT: 60 IN | BODY MASS INDEX: 25.13 KG/M2 | HEART RATE: 74 BPM | WEIGHT: 128 LBS | DIASTOLIC BLOOD PRESSURE: 95 MMHG | SYSTOLIC BLOOD PRESSURE: 161 MMHG | RESPIRATION RATE: 17 BRPM | OXYGEN SATURATION: 97 %

## 2023-05-24 LAB
ANION GAP SERPL CALCULATED.3IONS-SCNC: 9 MMOL/L (ref 5–15)
BUN SERPL-MCNC: 31 MG/DL (ref 8–23)
BUN/CREAT SERPL: 26.7 (ref 7–25)
CALCIUM SPEC-SCNC: 9.1 MG/DL (ref 8.6–10.5)
CHLORIDE SERPL-SCNC: 109 MMOL/L (ref 98–107)
CO2 SERPL-SCNC: 22 MMOL/L (ref 22–29)
CREAT SERPL-MCNC: 1.16 MG/DL (ref 0.57–1)
EGFRCR SERPLBLD CKD-EPI 2021: 52.8 ML/MIN/1.73
GLUCOSE SERPL-MCNC: 96 MG/DL (ref 65–99)
POTASSIUM SERPL-SCNC: 5.1 MMOL/L (ref 3.5–5.2)
SODIUM SERPL-SCNC: 140 MMOL/L (ref 136–145)

## 2023-05-24 PROCEDURE — 80048 BASIC METABOLIC PNL TOTAL CA: CPT | Performed by: INTERNAL MEDICINE

## 2023-05-24 PROCEDURE — G0378 HOSPITAL OBSERVATION PER HR: HCPCS

## 2023-05-24 PROCEDURE — 96372 THER/PROPH/DIAG INJ SC/IM: CPT

## 2023-05-24 PROCEDURE — 99239 HOSP IP/OBS DSCHRG MGMT >30: CPT | Performed by: INTERNAL MEDICINE

## 2023-05-24 PROCEDURE — 25010000002 HEPARIN (PORCINE) PER 1000 UNITS: Performed by: INTERNAL MEDICINE

## 2023-05-24 PROCEDURE — 96361 HYDRATE IV INFUSION ADD-ON: CPT

## 2023-05-24 RX ORDER — FAMOTIDINE 20 MG/1
20 TABLET, FILM COATED ORAL ONCE
Status: COMPLETED | OUTPATIENT
Start: 2023-05-24 | End: 2023-05-24

## 2023-05-24 RX ORDER — HYDRALAZINE HYDROCHLORIDE 25 MG/1
25 TABLET, FILM COATED ORAL EVERY 8 HOURS SCHEDULED
Qty: 90 TABLET | Refills: 0 | Status: SHIPPED | OUTPATIENT
Start: 2023-05-24

## 2023-05-24 RX ORDER — HYDRALAZINE HYDROCHLORIDE 25 MG/1
25 TABLET, FILM COATED ORAL EVERY 8 HOURS SCHEDULED
Status: DISCONTINUED | OUTPATIENT
Start: 2023-05-24 | End: 2023-05-24 | Stop reason: HOSPADM

## 2023-05-24 RX ORDER — HYDRALAZINE HYDROCHLORIDE 10 MG/1
10 TABLET, FILM COATED ORAL ONCE
Status: COMPLETED | OUTPATIENT
Start: 2023-05-24 | End: 2023-05-24

## 2023-05-24 RX ADMIN — AMLODIPINE BESYLATE 10 MG: 10 TABLET ORAL at 07:56

## 2023-05-24 RX ADMIN — FAMOTIDINE 20 MG: 20 TABLET, FILM COATED ORAL at 12:51

## 2023-05-24 RX ADMIN — BUSPIRONE HYDROCHLORIDE 5 MG: 10 TABLET ORAL at 07:55

## 2023-05-24 RX ADMIN — FOLIC ACID 1 MG: 1 TABLET ORAL at 07:56

## 2023-05-24 RX ADMIN — HYDRALAZINE HYDROCHLORIDE 10 MG: 10 TABLET, FILM COATED ORAL at 05:23

## 2023-05-24 RX ADMIN — HYDRALAZINE HYDROCHLORIDE 10 MG: 10 TABLET, FILM COATED ORAL at 08:42

## 2023-05-24 RX ADMIN — HEPARIN SODIUM 5000 UNITS: 5000 INJECTION INTRAVENOUS; SUBCUTANEOUS at 05:23

## 2023-05-24 RX ADMIN — GABAPENTIN 200 MG: 100 CAPSULE ORAL at 07:54

## 2023-05-24 RX ADMIN — CLOPIDOGREL BISULFATE 75 MG: 75 TABLET ORAL at 07:54

## 2023-05-24 RX ADMIN — LEVETIRACETAM 500 MG: 500 TABLET, FILM COATED ORAL at 07:55

## 2023-05-24 RX ADMIN — SODIUM CHLORIDE 50 ML/HR: 9 INJECTION, SOLUTION INTRAVENOUS at 00:17

## 2023-05-24 RX ADMIN — Medication 81 MG: at 07:55

## 2023-05-24 RX ADMIN — METOPROLOL SUCCINATE 25 MG: 25 TABLET, EXTENDED RELEASE ORAL at 07:54

## 2023-05-24 RX ADMIN — Medication 10 ML: at 07:59

## 2023-05-24 RX ADMIN — ESCITALOPRAM OXALATE 20 MG: 20 TABLET ORAL at 07:55

## 2023-05-24 RX ADMIN — HYDRALAZINE HYDROCHLORIDE 25 MG: 25 TABLET, FILM COATED ORAL at 12:51

## 2023-05-24 RX ADMIN — SENNOSIDES AND DOCUSATE SODIUM 2 TABLET: 50; 8.6 TABLET ORAL at 07:53

## 2023-05-24 RX ADMIN — Medication 1 PATCH: at 08:00

## 2023-05-24 NOTE — CASE MANAGEMENT/SOCIAL WORK
Case Management Discharge Note      Final Note: Spoke with patient at bedside. Patient discharging today. Plan is home with [facility]  for PT and OT. No other discharge identifed.         Selected Continued Care - Discharged on 5/24/2023 Admission date: 5/21/2023 - Discharge disposition: Home or Self Care    Destination    No services have been selected for the patient.              Durable Medical Equipment    No services have been selected for the patient.              Dialysis/Infusion    No services have been selected for the patient.              Home Medical Care Coordination complete.    Service Provider Selected Services Address Phone Fax Patient Preferred    [facility] ,  [facility] [address], [address], [place] [address] [phone] -- --          Therapy    No services have been selected for the patient.              Community Resources    No services have been selected for the patient.              Community & DME    No services have been selected for the patient.                       Final Discharge Disposition Code: 06 - home with home health careContinued Stay Note  [facility]     Patient Name: Gabriela Waller  MRN: [id number]  Today's Date: 5/24/2023    Admit Date: 5/21/2023    Plan: Home with HH   Discharge Plan     Row Name 05/24/23 1459       Plan    Plan Home with     Patient/Family in Agreement with Plan yes    Final Discharge Disposition Code 06 - home with home health care    Final Note Spoke with patient at bedside. Patient discharging today. Plan is home with [facility]  for PT and OT. No other discharge identifed.    Row Name 05/24/23 1411       Plan    Plan Home               Discharge Codes    No documentation.               Expected Discharge Date and Time     Expected Discharge Date Expected Discharge Time    May 24, 2023             Radha Constantino RN

## 2023-05-24 NOTE — PLAN OF CARE
Goal Outcome Evaluation:   Alert and oriented.  Standby assist. NSR.  Room air.  Anticipating discharge today.  Blood pressure elevated, prn hydralazine given.

## 2023-05-24 NOTE — DISCHARGE SUMMARY
[facility] Medicine Services  DISCHARGE SUMMARY    Patient Name: Gabriela Waller  : 1960  MRN: [id number]    Date of Admission: 2023  1:47 AM  Date of Discharge:  23  Primary Care Physician: System, Provider Not In    Consults     No orders found from 2023 to 2023.          Hospital Course     Presenting Problem:   Hypotension [I95.9]  Hypotension, unspecified hypotension type [I95.9]    Active Hospital Problems    Diagnosis  POA   • **Hypotension [I95.9]  Yes   • Hypotension, unspecified hypotension type [I95.9]  Yes      Resolved Hospital Problems   No resolved problems to display.          Hospital Course:    Gabriela Waller is a 62 y.o. female h/o essential hypertension, CKD stage III, old stroke, and seizure disorder with recent hospitalization in 2023 for hypovolemic shock that was felt to be secondary to severe diarrhea and polypharmacy at that time, improved and was discharged home and presented to the hospital again with weakness and hypotension.     She lives with her daughter and son-in-law and the patient usually manages her daily medications on her own.  Patient reported that she was having frequent bowel movements/diarrhea few days ago and over the last couple days, and she has been feeling more weak.  On the day of this admission, she got out of the bed and felt very dizzy and had multiple falls which prompted her son-in-law to call 911.     In ER, she was hypotensive with systolic in the 60s.  Blood pressure improved with 3 L of IV fluids.  Sodium was low at 130.  Potassium was low at 3.2.  Creatinine was up at 3.6.  She was admitted to the hospital service for further evaluation  Gabriela Waller is a 62 y.o. female with history of CKD III, CVA, seizure disorder and recent hospitalization for hypovolemic shock and BOBY felt due to volume depletion (diarrhea) and polypharmacy (multiple BP meds), presents with hypotension, weakness and  BOBY     Hypotension  Volume depletion  BOBY  - BP and creatinine improved with IV fluid hydration, from 3.6 at admission to 1.1 at discharge  - suspect accidental overuse of home antihypertensives.  - spoke with daughter who will start managing ms Dixon's home medications  - hold ARB and Aldactone at discharge  - continue norvasc and metoprolol  - titrated hydralazine up from 10 to 25 mg BID  - patient/daughter to obtain BP cuff and check home BP twice daily  - follow up with PCP in one week with BP check and BMP     H/o CVA  Dyslipidemia  - DAPT, Statin     H/o seizures  - keppra     Peripheral neuropathy     Tobacco abuse  - nicotine replacement, counseled cessation     Generalized weakness  - PT/OT while hospitalized  - home with  PT/OT     Uterine mass  - differential includes leiomyoma and ovarian dermoid  - discussed with patient and her daughter, including the need for close followup and additional evaluation with pelvic ultrasound to rule out other etiologies  - referral to gynecology at discharge    Positive blood culture  - likely contaminant, coag negative Staph      Discharge Follow Up Recommendations for outpatient labs/diagnostics:  BMP and blood pressure check at PCP follow up     Day of Discharge     HPI:   Feels fine today.  No cough.  Denies nausea, eating well.  Would like to go home today    Review of Systems  Gen- No fevers, chills  CV- No chest pain, palpitations  Resp- No cough, dyspnea  GI- No N/V/D, abd pain        Vital Signs:   Temp:  [97.1 °F (36.2 °C)-97.6 °F (36.4 °C)] 97.5 °F (36.4 °C)  Heart Rate:  [66-77] 74  Resp:  [16-17] 17  BP: (143-199)/() 161/95      Physical Exam:  Constitutional - no acute distress, nontoxic, in bed  HEENT-NCAT, mucous membranes moist  CV-RRR, S1 S2 normal, no m/r/g  Resp-CTAB  Abd-soft, nontender, nondistended, normoactive bowel sounds  Ext-No lower extremity cyanosis, clubbing or edema bilaterally  Neuro-alert and oriented, speech clear, moves all  extremities   Psych-normal affect   Skin- No rash on exposed UE or LE bilaterally      Pertinent  and/or Most Recent Results     LAB RESULTS:      Lab 05/22/23  0451 05/21/23  0508 05/21/23  0205 05/21/23  0157 05/17/23 2014   WBC 9.26  --   --  9.92 10.99*   HEMOGLOBIN 12.0  --   --  12.3 12.5   HEMOGLOBIN, POC  --   --  12.6  --   --    HEMATOCRIT 38.2  --   --  38.5 38.5   HEMATOCRIT POC  --   --  37*  --   --    PLATELETS 405  --   --  417 441   NEUTROS ABS 6.28  --   --  7.11* 6.98   IMMATURE GRANS (ABS) 0.03  --   --  0.04 0.03   LYMPHS ABS 1.88  --   --  1.58 2.68   MONOS ABS 0.87  --   --  1.08* 0.97*   EOS ABS 0.10  --   --  0.04 0.24   .1*  --   --  99.7* 98.2*   CRP  --   --   --  0.47  --    PROCALCITONIN  --   --   --  0.32* 0.07   LACTATE  --  0.8  --  2.4* 1.2         Lab 05/24/23  0508 05/23/23  1816 05/23/23  0552 05/22/23  0451 05/21/23  1143 05/21/23  0205 05/21/23  0157 05/17/23 2014   SODIUM 140  --  142 141  --   --  130* 138   POTASSIUM 5.1 4.5 3.5 3.8 3.9  --  3.3* 3.5   CHLORIDE 109*  --  109* 109*  --   --  90* 98   CO2 22.0  --  23.0 21.0*  --   --  26.0 26.0   ANION GAP 9.0  --  10.0 11.0  --   --  14.0 14.0   BUN 31*  --  27* 32*  --   --  43* 41*   CREATININE 1.16*  --  1.41* 2.02*  --  3.60* 3.32* 1.99*   EGFR 52.8*  --  41.7* 27.1*  --  13.6* 14.9* 27.6*   GLUCOSE 96  --  86 73  --   --  104* 94   CALCIUM 9.1  --  8.4* 8.2*  --   --  8.1* 9.1   MAGNESIUM  --   --  1.6 2.0  --   --  1.8  --    PHOSPHORUS  --   --   --   --   --   --  5.6*  --    TSH  --   --   --   --   --   --  3.410  --          Lab 05/22/23 0451 05/21/23 0157 05/17/23 2014   TOTAL PROTEIN 5.0* 5.3* 6.4   ALBUMIN 3.3* 3.2* 3.9   GLOBULIN 1.7 2.1 2.5   ALT (SGPT) 7 7 9   AST (SGOT) 11 11 15   BILIRUBIN 0.2 0.2 <0.2   ALK PHOS 60 72 95   LIPASE  --  39  --          Lab 05/21/23 0157   PROBNP 4,393.0*   HSTROP T 36*             Lab 05/21/23 0157   VITAMIN B 12 403         Lab 05/21/23  0328   PH, ARTERIAL  7.381   PCO2, ARTERIAL 40.6   PO2 ART 72.4*   FIO2 21   HCO3 ART 24.0   BASE EXCESS ART -1.0*   CARBOXYHEMOGLOBIN 3.0*     Brief Urine Lab Results  (Last result in the past 365 days)      Color   Clarity   Blood   Leuk Est   Nitrite   Protein   CREAT   Urine HCG        05/21/23 0341 Yellow   Cloudy   Negative   Negative   Negative   >=300 mg/dL (3+)               Microbiology Results (last 10 days)     Procedure Component Value - Date/Time    COVID PRE-OP / PRE-PROCEDURE SCREENING ORDER (NO ISOLATION) - Swab, Nasopharynx [[id number]]  (Normal) Collected: 05/21/23 0247    Lab Status: Final result Specimen: Swab from Nasopharynx Updated: 05/21/23 0414    Narrative:      The following orders were created for panel order COVID PRE-OP / PRE-PROCEDURE SCREENING ORDER (NO ISOLATION) - Swab, Nasopharynx.  Procedure                               Abnormality         Status                     ---------                               -----------         ------                     Respiratory Panel PCR w/...[[id number]]  Normal              Final result                 Please view results for these tests on the individual orders.    Respiratory Panel PCR w/COVID-19(SARS-CoV-2) DERRELL/CINDY/DIAMANTE/PAD/COR/MAD/WALE In-House, NP Swab in UTM/VTM, 3-4 HR TAT - Swab, Nasopharynx [[id number]]  (Normal) Collected: 05/21/23 0247    Lab Status: Final result Specimen: Swab from Nasopharynx Updated: 05/21/23 0414     ADENOVIRUS, PCR Not Detected     Coronavirus 229E Not Detected     Coronavirus HKU1 Not Detected     Coronavirus NL63 Not Detected     Coronavirus OC43 Not Detected     COVID19 Not Detected     Human Metapneumovirus Not Detected     Human Rhinovirus/Enterovirus Not Detected     Influenza A PCR Not Detected     Influenza B PCR Not Detected     Parainfluenza Virus 1 Not Detected     Parainfluenza Virus 2 Not Detected     Parainfluenza Virus 3 Not Detected     Parainfluenza Virus 4 Not Detected     RSV, PCR Not Detected     Bordetella  pertussis pcr Not Detected     Bordetella parapertussis PCR Not Detected     Chlamydophila pneumoniae PCR Not Detected     Mycoplasma pneumo by PCR Not Detected    Narrative:      In the setting of a positive respiratory panel with a viral infection PLUS a negative procalcitonin without other underlying concern for bacterial infection, consider observing off antibiotics or discontinuation of antibiotics and continue supportive care. If the respiratory panel is positive for atypical bacterial infection (Bordetella pertussis, Chlamydophila pneumoniae, or Mycoplasma pneumoniae), consider antibiotic de-escalation to target atypical bacterial infection.    Blood Culture - Blood, Arm, Right [[id number]]  (Abnormal) Collected: 05/21/23 0232    Lab Status: Final result Specimen: Blood from Arm, Right Updated: 05/23/23 0635     Blood Culture Staphylococcus, coagulase negative     Isolated from Anaerobic Bottle     Gram Stain Anaerobic Bottle Gram positive cocci in groups    Narrative:      Probable contaminant requires clinical correlation, susceptibility not performed unless requested by physician.      Blood Culture - Blood, Arm, Left [[id number]]  (Normal) Collected: 05/21/23 0232    Lab Status: Preliminary result Specimen: Blood from Arm, Left Updated: 05/24/23 0645     Blood Culture No growth at 3 days    Blood Culture ID, PCR - Blood, Arm, Right [[id number]]  (Abnormal) Collected: 05/21/23 0232    Lab Status: Final result Specimen: Blood from Arm, Right Updated: 05/22/23 0327     BCID, PCR Staph spp, not aureus or lugdunensis. Identification by BCID2 PCR.          CT Abdomen Pelvis Without Contrast    Result Date: 5/21/2023  EXAMINATION: CT ABDOMEN PELVIS WO CONTRAST DATE: 5/21/2023 4:32 AM INDICATION:  Sepsis, hypotension, acute renal failure ; COMPARISON: None. PROCEDURE:   CT of the abdomen and pelvis was performed with multiplanar reformatted images without intravenous contrast enhancement.  The exam is limited  because some types of pathology may not be adequately demonstrated due to lack of contrast enhancement.  CT dose lowering techniques were used, to include: automated exposure control, adjustment for patient size, and or use of iterative reconstruction. FINDINGS: LOWER CHEST :  Unremarkable. ABDOMEN: Liver and Biliary system: No suspicious focal liver lesion on noncontrast exam. No biliary ductal dilation. Gallbladder:  Normal. Spleen:  Normal. Pancreas:  Normal. Adrenal glands:  Normal. Kidneys and ureters:  Moderate cortical atrophy right kidney. Probable small exophytic cyst lower pole right kidney and interpolar left kidney. No radiopaque urinary tract calculus. No hydronephrosis. Aorta/IVC:  Moderate atherosclerotic calcifications.  No abdominal aortic aneurysm.  IVC not dilated. Lymph nodes:  No visible abdominal lymphadenopathy. Gastrointestinal tract:  Stomach normal. Small bowel not dilated or thick-walled. Appendix normal. No colonic wall thickening or dilation. Peritoneum/Mesentery:  No ascites.  No pneumoperitoneum. Abdominal wall:  Postsurgical changes anterior abdominal wall. PELVIS: Urinary bladder:  Unremarkable. Reproductive organs:  5.5 cm circumscribed mass with internal lobules of fat inseparable from the uterus and right adnexa. Lymph Nodes:  No pelvic lymphadenopathy.. BONES:  Degenerative changes in the spine..      1.  5.5 cm fat-containing pelvic mass, uterine likely leiomyoma or ovarian dermoid. Recommend follow-up pelvic ultrasound in one year. 2.  Otherwise, no acute abnormality within limitations of unenhanced exam. 3.  Chronic findings as above. Electronically signed by:  Keiko Orozco M.D.  5/21/2023 3:11 AM Mountain Time    CT Head Without Contrast    Result Date: 5/17/2023  CT HEAD WO CONTRAST Date of Exam: 5/17/2023 8:49 PM EDT Indication: Seizure, funny feeling and had. Comparison: 10/15/2022 and prior Technique: Axial CT images were obtained of the head without contrast  administration.  Reconstructed coronal and sagittal images were also obtained. Automated exposure control and iterative construction methods were used. FINDINGS: Brain/Ventricles: There is no acute intracranial hemorrhage. There is a focal area of low-attenuation. Midline at the posterior aspect of the left parietal lobe. This is stable from the comparison. Moderate white matter changes within the deep and periventricular white matter are again noted compatible with small vessel ischemic disease. Remote lacunar infarct noted within the left cerebellar hemisphere again noted Orbits: The visualized portion of the orbits demonstrate no acute abnormality. Sinuses: Visualized paranasal sinuses are clear. Bilateral mastoid effusions again noted Soft Tissues/Skull: No acute abnormality of the visualized skull or soft tissues.     No acute intracranial abnormality. Areas of low-attenuation compatible with remote infarcts in the left parietal lobe and left cerebellar hemisphere White matter changes compatible small vessel ischemic disease noted within the supratentorial brain Electronically Signed: Enzo Florian  5/17/2023 9:00 PM EDT  Workstation ID: [id number]    XR Chest 1 View    Result Date: 5/21/2023  PROCEDURE:   XR CHEST 1 VW HISTORY:   Weakness COMPARISONS: 3/16/2023 FINDINGS: Lungs and pleura are clear. Smooth cardiomediastinal contours. Regional bones and soft tissues show no acute abnormality.     No acute cardiopulmonary abnormality. Electronically signed by:  Delfino Camacho D.O.  5/21/2023 12:54 AM Mountain Time              Results for orders placed during the hospital encounter of 03/16/23    Adult Transthoracic Echo Complete W/ Cont if Necessary Per Protocol    Interpretation Summary  •  Left ventricular systolic function is normal. Calculated left ventricular EF = 60.3%  •  Left ventricular diastolic function was normal.  •  Left atrial volume is mildly increased.  •  Estimated right ventricular  systolic pressure from tricuspid regurgitation is normal (<35 mmHg).      Plan for Follow-up of Pending Labs/Results:   Pending Labs     Order Current Status    Blood Culture - Blood, Arm, Left Preliminary result        Discharge Details        Discharge Medications      Changes to Medications      Instructions Start Date   hydrALAZINE 25 MG tablet  Commonly known as: APRESOLINE  What changed:   · when to take this  · reasons to take this   25 mg, Oral, Every 8 Hours Scheduled         Continue These Medications      Instructions Start Date   albuterol sulfate  (90 Base) MCG/ACT inhaler  Commonly known as: PROVENTIL HFA;VENTOLIN HFA;PROAIR HFA   2 puffs, Inhalation, Every 4 Hours PRN      amLODIPine 10 MG tablet  Commonly known as: NORVASC   10 mg, Oral, Daily      aspirin 81 MG EC tablet   81 mg, Oral, Daily, OTC      atorvastatin 80 MG tablet  Commonly known as: LIPITOR   80 mg, Oral, Nightly      busPIRone 5 MG tablet  Commonly known as: BUSPAR   5 mg, Oral, 2 Times Daily      clopidogrel 75 MG tablet  Commonly known as: PLAVIX   75 mg, Oral, Daily      escitalopram 20 MG tablet  Commonly known as: LEXAPRO   20 mg, Oral, Daily      folic acid 1 MG tablet  Commonly known as: FOLVITE   1 mg, Oral, Daily      icosapent ethyl 1 g capsule capsule  Commonly known as: VASCEPA   2 g, Oral, 2 Times Daily With Meals      levETIRAcetam 500 MG tablet  Commonly known as: KEPPRA   500 mg, Oral, 2 Times Daily      metoprolol succinate XL 50 MG 24 hr tablet  Commonly known as: TOPROL-XL   50 mg, Oral, Daily      mirtazapine 7.5 MG half tablet  Commonly known as: REMERON   15 mg, Oral, Nightly      traZODone 50 MG tablet  Commonly known as: DESYREL   50 mg, Oral, Nightly      vitamin B-12 1000 MCG tablet  Commonly known as: CYANOCOBALAMIN   1,000 mcg, Oral, Daily, OTC         Stop These Medications    losartan 25 MG tablet  Commonly known as: COZAAR     spironolactone 25 MG tablet  Commonly known as: ALDACTONE             Allergies   Allergen Reactions   • Prunus Persica Hives         Discharge Disposition:  Home or Self Care    Diet:  Hospital:  Diet Order   Procedures   • Diet: Regular/House Diet; Texture: Regular Texture (IDDSI 7); Fluid Consistency: Thin (IDDSI 0)       Activity:      Restrictions or Other Recommendations:         CODE STATUS:    Code Status and Medical Interventions:   Ordered at: 05/23/23 3111     Level Of Support Discussed With:    Patient     Code Status (Patient has no pulse and is not breathing):    CPR (Attempt to Resuscitate)     Medical Interventions (Patient has pulse or is breathing):    Full Support     Release to patient:    Routine Release       No future appointments.    Additional Instructions for the Follow-ups that You Need to Schedule     Ambulatory Referral to Physical Therapy   As directed      Follow-up needed: Yes         Discharge Follow-up with PCP   As directed       Currently Documented PCP:    System, Provider Not In    PCP Phone Number:    None     Follow Up Details: follow up with PCP in one week         Discharge Follow-up with PCP   As directed       Currently Documented PCP:    System, Provider Not In    PCP Phone Number:    None     Follow Up Details: follow up with Primary Care Physician in one week         Discharge Follow-up with Specialty: follow up with Gynecology 2-4 weeks, possible lieomyoma   As directed      Specialty: follow up with Gynecology 2-4 weeks, possible lieomyoma                     Omero Puente MD  05/24/23      Time Spent on Discharge:  I spent  35  minutes on this discharge activity which included: face-to-face encounter with the patient, reviewing the data in the system, coordination of the care with the nursing staff as well as consultants, documentation, and entering orders.

## 2023-05-25 NOTE — OUTREACH NOTE
Prep Survey    Flowsheet Row Responses   [facility] patient discharged from? [facility]   Is LACE score < 7 ? No   Eligibility Readm Mgmt   Discharge diagnosis Hypotension, Volume depletion   Does the patient have one of the following disease processes/diagnoses(primary or secondary)? Other   Does the patient have Home health ordered? Yes   What is the Home health agency?  [facility]   Is there a DME ordered? No   Prep survey completed? Yes          Lolis VERA - Registered Nurse

## 2023-05-26 LAB — BACTERIA SPEC AEROBE CULT: NORMAL

## 2023-05-29 LAB
QT INTERVAL: 530 MS
QTC INTERVAL: 502 MS

## 2023-06-05 ENCOUNTER — READMISSION MANAGEMENT (OUTPATIENT)
Dept: CALL CENTER | Facility: HOSPITAL | Age: 63
End: 2023-06-05
Payer: MEDICARE

## 2023-06-05 NOTE — OUTREACH NOTE
Medical Week 2 Survey      Flowsheet Row Responses   [facility] patient discharged from? [place]   Does the patient have one of the following disease processes/diagnoses(primary or secondary)? Other   Week 2 attempt successful? Yes   Call start time 1310   Discharge diagnosis Hypotension, Volume depletion   Call end time 1313   Meds reviewed with patient/caregiver? Yes   Is the patient taking all medications as directed (includes completed medication regime)? Yes   Does the patient have a primary care provider?  Yes   Does the patient have an appointment with their PCP within 7 days of discharge? No   Comments regarding PCP Pt missed her last apt with her PCP d/t transportation-states she plans on rescheduling   Has the patient kept scheduled appointments due by today? N/A   What is the Home health agency?  [facility]    Home health comments PT released pt d/t pt was doing well   Psychosocial issues? No   Did the patient receive a copy of their discharge instructions? Yes   Nursing interventions Reviewed instructions with patient   What is the patient's perception of their health status since discharge? Improving   Is the patient/caregiver able to teach back signs and symptoms related to disease process for when to call PCP? Yes   Is the patient/caregiver able to teach back signs and symptoms related to disease process for when to call 911? Yes   Is the patient/caregiver able to teach back the hierarchy of who to call/visit for symptoms/problems? PCP, Specialist, Home health nurse, Urgent Care, ED, 911 Yes   If the patient is a current smoker, are they able to teach back resources for cessation? [id number]   Week 2 Call Completed? Yes   Graduated Yes   Did the patient feel the follow up calls were helpful during their recovery period? Yes   Graduated/Revoked comments No issues or concerns during call            Bev H - Registered Nurse

## 2023-09-27 ENCOUNTER — TRANSCRIBE ORDERS (OUTPATIENT)
Dept: ADMINISTRATIVE | Facility: HOSPITAL | Age: 63
End: 2023-09-27
Payer: MEDICARE

## 2023-09-27 DIAGNOSIS — Z12.31 VISIT FOR SCREENING MAMMOGRAM: Primary | ICD-10-CM

## 2025-08-21 ENCOUNTER — HOSPITAL ENCOUNTER (EMERGENCY)
Facility: HOSPITAL | Age: 65
Discharge: HOME | End: 2025-08-21
Attending: EMERGENCY MEDICINE
Payer: MEDICARE

## 2025-08-21 ENCOUNTER — APPOINTMENT (OUTPATIENT)
Dept: GENERAL RADIOLOGY | Facility: HOSPITAL | Age: 65
End: 2025-08-21
Payer: MEDICARE

## 2025-08-21 ENCOUNTER — APPOINTMENT (OUTPATIENT)
Dept: CT IMAGING | Facility: HOSPITAL | Age: 65
End: 2025-08-21
Payer: MEDICARE